# Patient Record
Sex: FEMALE | Race: AMERICAN INDIAN OR ALASKA NATIVE | Employment: FULL TIME | URBAN - METROPOLITAN AREA
[De-identification: names, ages, dates, MRNs, and addresses within clinical notes are randomized per-mention and may not be internally consistent; named-entity substitution may affect disease eponyms.]

---

## 2017-01-03 ENCOUNTER — OFFICE VISIT (OUTPATIENT)
Dept: INTERNAL MEDICINE CLINIC | Facility: CLINIC | Age: 47
End: 2017-01-03

## 2017-01-03 VITALS
OXYGEN SATURATION: 94 % | HEIGHT: 64.75 IN | TEMPERATURE: 98 F | WEIGHT: 160 LBS | HEART RATE: 64 BPM | RESPIRATION RATE: 16 BRPM | DIASTOLIC BLOOD PRESSURE: 80 MMHG | SYSTOLIC BLOOD PRESSURE: 124 MMHG | BODY MASS INDEX: 26.98 KG/M2

## 2017-01-03 DIAGNOSIS — H81.12 BPPV (BENIGN PAROXYSMAL POSITIONAL VERTIGO), LEFT: Primary | ICD-10-CM

## 2017-01-03 DIAGNOSIS — R35.0 INCREASED URINARY FREQUENCY: ICD-10-CM

## 2017-01-03 DIAGNOSIS — G43.009 MIGRAINE WITHOUT AURA AND WITHOUT STATUS MIGRAINOSUS, NOT INTRACTABLE: ICD-10-CM

## 2017-01-03 LAB
APPEARANCE: CLEAR
BILIRUBIN: NEGATIVE
GLUCOSE (URINE DIPSTICK): NEGATIVE MG/DL
KETONES (URINE DIPSTICK): NEGATIVE MG/DL
MULTISTIX LOT#: ABNORMAL NUMERIC
NITRITE, URINE: NEGATIVE
OCCULT BLOOD: NEGATIVE
PH, URINE: 5.5 (ref 4.5–8)
PROTEIN (URINE DIPSTICK): NEGATIVE MG/DL
SPECIFIC GRAVITY: 1.01 (ref 1–1.03)
URINE-COLOR: YELLOW
UROBILINOGEN,SEMI-QN: 0.2 MG/DL (ref 0–1.9)

## 2017-01-03 PROCEDURE — 81003 URINALYSIS AUTO W/O SCOPE: CPT | Performed by: INTERNAL MEDICINE

## 2017-01-03 PROCEDURE — 99214 OFFICE O/P EST MOD 30 MIN: CPT | Performed by: INTERNAL MEDICINE

## 2017-01-03 RX ORDER — PREDNISONE 20 MG/1
TABLET ORAL
Qty: 10 TABLET | Refills: 0 | Status: SHIPPED | OUTPATIENT
Start: 2017-01-03 | End: 2017-04-01 | Stop reason: ALTCHOICE

## 2017-01-03 RX ORDER — BUTALBITAL, ACETAMINOPHEN AND CAFFEINE 300; 40; 50 MG/1; MG/1; MG/1
1 CAPSULE ORAL EVERY 4 HOURS PRN
Qty: 84 CAPSULE | Refills: 0 | Status: SHIPPED | OUTPATIENT
Start: 2017-01-03 | End: 2020-05-27 | Stop reason: ALTCHOICE

## 2017-01-03 NOTE — PATIENT INSTRUCTIONS
Benign Paroxysmal Positional Vertigo    Benign paroxysmal positional vertigo is a common condition. You feel as if the room is spinning after changing position, moving your head quickly, or even just rolling over in bed.   Vertigo is a false feeling of mo If you had a CT or MRI scan, a specialist will review it. You will be told of any new findings that may affect your care.   When to seek medical advice  Call your healthcare provider right away if any of these occur:  · Vertigo gets worse even after taking

## 2017-01-04 NOTE — PROGRESS NOTES
HPI:    Patient ID: Florentin Mtz is a 48year old female. HPI  27-year-old female complaining of dizziness. wqas seen in ER.  The patient states  started about a week ago she woke up with a feeling of dizziness at the room spinning this would happe capsule Rfl: 0   predniSONE 20 MG Oral Tab Take 2 tablets by mouth daily x 5 days Disp: 10 tablet Rfl: 0   Meclizine HCl 25 MG Oral Tab Take 1 tablet (25 mg total) by mouth 3 (three) times daily as needed.  Disp: 20 tablet Rfl: 0   HYDROCHLOROTHIAZIDE 25 MG Cont home vertigo exercises    Orders Placed This Encounter  Urine Dip, auto without Micro    Meds This Visit:  Signed Prescriptions Disp Refills    Butalbital-APAP-Caffeine -40 MG Oral Cap 84 capsule 0      Sig: Take 1 capsule by mouth every 4 (four

## 2017-01-10 LAB
CHOL/HDL RATIO: 4.5 (ref ?–4.44)
CHOLESTEROL: 236 MG/DL (ref ?–200)
CREATININE: 1
GLUCOSE: 85 MG/DL (ref 70–99)
HDL CHOL: 52
LDL CHOLESTROL: 122
TRIGLYCERIDES: 309 MG/DL (ref ?–150)

## 2017-01-30 RX ORDER — ALPRAZOLAM 0.25 MG/1
TABLET ORAL
Qty: 30 TABLET | Refills: 0 | Status: SHIPPED
Start: 2017-01-30 | End: 2017-08-11

## 2017-02-02 ENCOUNTER — TELEPHONE (OUTPATIENT)
Dept: INTERNAL MEDICINE CLINIC | Facility: CLINIC | Age: 47
End: 2017-02-02

## 2017-02-02 DIAGNOSIS — E83.39 SERUM PHOSPHATE ELEVATED: Primary | ICD-10-CM

## 2017-02-02 NOTE — TELEPHONE ENCOUNTER
Per Dr Lambert Gave: CBC is NL, 10 year CAD 2.2%, no statin at this time just lifestyle changes (diet and exercise), no DM. Elevated phosphorus, please repeat. Spoke with patient and relayed test results. She verbalized understanding and agreed with plan.

## 2017-02-27 ENCOUNTER — TELEPHONE (OUTPATIENT)
Dept: INTERNAL MEDICINE CLINIC | Facility: CLINIC | Age: 47
End: 2017-02-27

## 2017-02-27 ENCOUNTER — APPOINTMENT (OUTPATIENT)
Dept: LAB | Age: 47
End: 2017-02-27
Attending: INTERNAL MEDICINE
Payer: COMMERCIAL

## 2017-02-27 DIAGNOSIS — Z12.31 ENCOUNTER FOR SCREENING MAMMOGRAM FOR BREAST CANCER: Primary | ICD-10-CM

## 2017-02-27 DIAGNOSIS — E83.39 SERUM PHOSPHATE ELEVATED: ICD-10-CM

## 2017-02-27 LAB — PHOSPHATE SERPL-MCNC: 3.1 MG/DL (ref 2.5–4.9)

## 2017-02-27 PROCEDURE — 84100 ASSAY OF PHOSPHORUS: CPT

## 2017-02-27 PROCEDURE — 36415 COLL VENOUS BLD VENIPUNCTURE: CPT

## 2017-03-06 ENCOUNTER — HOSPITAL ENCOUNTER (OUTPATIENT)
Dept: MAMMOGRAPHY | Age: 47
Discharge: HOME | End: 2017-03-06
Attending: INTERNAL MEDICINE
Payer: COMMERCIAL

## 2017-03-06 DIAGNOSIS — Z12.31 ENCOUNTER FOR SCREENING MAMMOGRAM FOR BREAST CANCER: ICD-10-CM

## 2017-03-06 PROCEDURE — 77067 SCR MAMMO BI INCL CAD: CPT

## 2017-03-08 ENCOUNTER — HOSPITAL ENCOUNTER (OUTPATIENT)
Dept: MAMMOGRAPHY | Facility: HOSPITAL | Age: 47
Discharge: HOME | End: 2017-03-08
Attending: INTERNAL MEDICINE
Payer: COMMERCIAL

## 2017-03-08 ENCOUNTER — TELEPHONE (OUTPATIENT)
Dept: OBGYN CLINIC | Facility: CLINIC | Age: 47
End: 2017-03-08

## 2017-03-08 DIAGNOSIS — R92.2 INCONCLUSIVE MAMMOGRAM: ICD-10-CM

## 2017-03-08 PROCEDURE — 77061 BREAST TOMOSYNTHESIS UNI: CPT

## 2017-03-08 PROCEDURE — 77065 DX MAMMO INCL CAD UNI: CPT

## 2017-03-08 PROCEDURE — 76642 ULTRASOUND BREAST LIMITED: CPT

## 2017-03-08 NOTE — TELEPHONE ENCOUNTER
Pt saw Dr. Cinthia Walton in December for Left Ovarian pain    Pt said they discussed an US. Can we order and set up an appt?     Please advise PT

## 2017-03-08 NOTE — TELEPHONE ENCOUNTER
Please schedule patient for US in [place] per Dr. Adilene Garcia note. She can see him after to discuss findings.

## 2017-03-15 ENCOUNTER — TELEPHONE (OUTPATIENT)
Dept: OBGYN CLINIC | Facility: CLINIC | Age: 47
End: 2017-03-15

## 2017-03-15 NOTE — TELEPHONE ENCOUNTER
Uncomfortable moderate pain in lower left abdomen  PT taking over the counter pain meds  PT would like a call tomorrow when the nurses come in to see if there is something else she can take  If her pain gets worse she will call the doctor on call this even

## 2017-03-16 NOTE — TELEPHONE ENCOUNTER
Patient has appointment 03/17/17. States she has been having pain in the left ovary area. Has been taking Aleve. No improvement. Advised patient she can take 600 MG every 6 hours. To call if symptoms worsen before appointment.   Patient verbalized under

## 2017-03-17 ENCOUNTER — OFFICE VISIT (OUTPATIENT)
Dept: OBGYN CLINIC | Facility: CLINIC | Age: 47
End: 2017-03-17

## 2017-03-17 ENCOUNTER — APPOINTMENT (OUTPATIENT)
Dept: OBGYN CLINIC | Facility: CLINIC | Age: 47
End: 2017-03-17

## 2017-03-17 DIAGNOSIS — R10.32 COLICKY LLQ ABDOMINAL PAIN: Primary | ICD-10-CM

## 2017-03-17 PROCEDURE — 76856 US EXAM PELVIC COMPLETE: CPT | Performed by: OBSTETRICS & GYNECOLOGY

## 2017-03-17 PROCEDURE — 99212 OFFICE O/P EST SF 10 MIN: CPT | Performed by: OBSTETRICS & GYNECOLOGY

## 2017-03-17 NOTE — PROGRESS NOTES
Quick Note:    Here for gynecological ultrasound due to persistent colicky LLQ  pain. Saw Dr Odilia Capone in December 2016 for same. Cyst was suspected  but no ultrasound done. Was to return if persisted but did not.   Called recently since pain persists and ultras

## 2017-03-17 NOTE — PROGRESS NOTES
Here for gynecological ultrasound due to persistent colicky LLQ  pain. Saw Dr Belén Borges in December 2016 for same. Cyst was suspected  but no ultrasound done. Was to return if persisted but did not.   Called recently since pain persists and ultrasound recommende

## 2017-03-28 DIAGNOSIS — I10 ESSENTIAL HYPERTENSION, BENIGN: Primary | ICD-10-CM

## 2017-03-28 RX ORDER — HYDROCHLOROTHIAZIDE 25 MG/1
TABLET ORAL
Qty: 90 TABLET | Refills: 0 | Status: SHIPPED | OUTPATIENT
Start: 2017-03-28 | End: 2017-08-27

## 2017-04-01 ENCOUNTER — HOSPITAL ENCOUNTER (OUTPATIENT)
Age: 47
Discharge: HOME | End: 2017-04-01
Attending: FAMILY MEDICINE
Payer: COMMERCIAL

## 2017-04-01 VITALS
RESPIRATION RATE: 16 BRPM | SYSTOLIC BLOOD PRESSURE: 150 MMHG | HEART RATE: 87 BPM | DIASTOLIC BLOOD PRESSURE: 98 MMHG | OXYGEN SATURATION: 99 % | TEMPERATURE: 99 F

## 2017-04-01 DIAGNOSIS — J02.0 STREP PHARYNGITIS: Primary | ICD-10-CM

## 2017-04-01 PROCEDURE — 99213 OFFICE O/P EST LOW 20 MIN: CPT

## 2017-04-01 PROCEDURE — 87430 STREP A AG IA: CPT | Performed by: FAMILY MEDICINE

## 2017-04-01 PROCEDURE — 99204 OFFICE O/P NEW MOD 45 MIN: CPT

## 2017-04-01 RX ORDER — AMOXICILLIN 875 MG/1
875 TABLET, COATED ORAL 2 TIMES DAILY
Qty: 20 TABLET | Refills: 0 | Status: SHIPPED | OUTPATIENT
Start: 2017-04-01 | End: 2017-04-11

## 2017-04-01 RX ORDER — IBUPROFEN 600 MG/1
600 TABLET ORAL ONCE
Status: COMPLETED | OUTPATIENT
Start: 2017-04-01 | End: 2017-04-01

## 2017-04-01 NOTE — ED PROVIDER NOTES
Patient Seen in: [facility]    History   Patient presents with:  Sore Throat  Ear Problem Pain (neurosensory)    Stated Complaint: SORE THROAT X 3 DAYS    HPI    47-year-old  presents to immediate care with a hi capsule by mouth every 4 (four) hours as needed for Pain.    MONTELUKAST SODIUM 10 MG Oral Tab,  TAKE 1 TABLET BY MOUTH EVERY DAY   FLUTICASONE PROPIONATE 50 MCG/ACT Nasal Suspension,  USE 2 SPRAYS NASALLY DAILY   Fexofenadine HCl (ALLEGRA) 180 MG Oral Tab, intact distal pulses. Pulmonary/Chest: Effort normal and breath sounds normal.   Abdominal: Soft. Bowel sounds are normal.   Neurological: She is alert and oriented to person, place, and time. She has normal reflexes. Skin: Skin is warm and dry.

## 2017-04-02 NOTE — ED NOTES
Call placed to pt in response to [facility] text. Pt states she wasn't feeling better this morning, but she is beginning to feel better now. No further questions. Says there is nothing more that she needs.

## 2017-04-04 ENCOUNTER — TELEPHONE (OUTPATIENT)
Dept: INTERNAL MEDICINE CLINIC | Facility: CLINIC | Age: 47
End: 2017-04-04

## 2017-04-04 NOTE — TELEPHONE ENCOUNTER
Pt was seen in the urgent care on Saturday, was diagnosed with strep and was treated with an antibiotic. Rx was amoxicillin bid for 10 days.      Pt states thought she was feeling improved from Saturday but began to experience worsening symptoms including t

## 2017-04-04 NOTE — TELEPHONE ENCOUNTER
Pt was seen at the urgent care and was diagnoised with strep and is feeling a little better, but now is having some ear pain and sweats, dont know is she needs to be seen or not?  Call back

## 2017-04-04 NOTE — TELEPHONE ENCOUNTER
Pt states went to the nurse and the ears have some redness present and no fever. Pt states would prefer to continue to monitor and treat symptoms OTC. If symptoms continue to fail improvement the pt will call and schedule an appointment.

## 2017-05-26 DIAGNOSIS — J30.9 ALLERGIC RHINITIS, UNSPECIFIED ALLERGIC RHINITIS TRIGGER, UNSPECIFIED RHINITIS SEASONALITY: Primary | ICD-10-CM

## 2017-05-30 RX ORDER — MONTELUKAST SODIUM 10 MG/1
TABLET ORAL
Qty: 90 TABLET | Refills: 0 | Status: SHIPPED | OUTPATIENT
Start: 2017-05-30 | End: 2017-12-20

## 2017-07-19 ENCOUNTER — OFFICE VISIT (OUTPATIENT)
Dept: INTERNAL MEDICINE CLINIC | Facility: CLINIC | Age: 47
End: 2017-07-19

## 2017-07-19 ENCOUNTER — TELEPHONE (OUTPATIENT)
Dept: INTERNAL MEDICINE CLINIC | Facility: CLINIC | Age: 47
End: 2017-07-19

## 2017-07-19 VITALS
RESPIRATION RATE: 16 BRPM | WEIGHT: 158 LBS | DIASTOLIC BLOOD PRESSURE: 80 MMHG | SYSTOLIC BLOOD PRESSURE: 120 MMHG | HEART RATE: 80 BPM | HEIGHT: 64.75 IN | TEMPERATURE: 99 F | BODY MASS INDEX: 26.65 KG/M2 | OXYGEN SATURATION: 99 %

## 2017-07-19 DIAGNOSIS — R10.9 ABDOMINAL DISCOMFORT: Primary | ICD-10-CM

## 2017-07-19 PROCEDURE — 99214 OFFICE O/P EST MOD 30 MIN: CPT | Performed by: PHYSICIAN ASSISTANT

## 2017-07-19 NOTE — PROGRESS NOTES
HPI:    Patient ID: Natalya Glez is a 50year old female. Abdominal Pain   This is a new problem. The current episode started yesterday. The onset quality is sudden. The problem occurs constantly. The problem has been unchanged.  The pain is locate Rfl: 0   FLUTICASONE PROPIONATE 50 MCG/ACT Nasal Suspension USE 2 SPRAYS NASALLY DAILY Disp: 48 g Rfl: 0   Fexofenadine HCl (ALLEGRA) 180 MG Oral Tab Take 180 mg by mouth daily.  Disp:  Rfl:    Ciprofloxacin HCl (CIPRO) 500 MG Oral Tab Take 1 tablet (500 mg bowel rest and monitor symptoms; call right away if worsening symptoms including pain, fever, diarrhea, blood in stool, or if symptoms fail to improve. Pt expresses understanding and agrees to the plan. F/u prn.      No orders of the defined types were

## 2017-07-19 NOTE — TELEPHONE ENCOUNTER
Spoke with pt she has had left sided abdominal directly located across from [employer].  Pt describes as an \"uncomfortable feeling\" and 3/10 on pain scale. She denies fever or change in bowel habits. She does report nausea off/on. Pain is increasing.   Appt

## 2017-07-19 NOTE — TELEPHONE ENCOUNTER
Patient called and wanted to be seen today with only Anant Leal, but 215pm did not work for her, so she scheduled with Dr Sandra Tariq tomorrow, but would like to speak to RN for her L Abdominal pain. Please advise.

## 2017-07-21 ENCOUNTER — HOSPITAL ENCOUNTER (OUTPATIENT)
Dept: CT IMAGING | Age: 47
Discharge: HOME | End: 2017-07-21
Attending: PHYSICIAN ASSISTANT
Payer: COMMERCIAL

## 2017-07-21 ENCOUNTER — TELEPHONE (OUTPATIENT)
Dept: INTERNAL MEDICINE CLINIC | Facility: CLINIC | Age: 47
End: 2017-07-21

## 2017-07-21 DIAGNOSIS — R10.9 ABDOMINAL PAIN, UNSPECIFIED LOCATION: ICD-10-CM

## 2017-07-21 DIAGNOSIS — R50.9 LOW GRADE FEVER: ICD-10-CM

## 2017-07-21 DIAGNOSIS — K57.92 DIVERTICULITIS OF INTESTINE, UNSPECIFIED BLEEDING STATUS, UNSPECIFIED COMPLICATION STATUS, UNSPECIFIED PART OF INTESTINAL TRACT: ICD-10-CM

## 2017-07-21 DIAGNOSIS — R19.7 DIARRHEA, UNSPECIFIED TYPE: Primary | ICD-10-CM

## 2017-07-21 DIAGNOSIS — R19.7 DIARRHEA, UNSPECIFIED TYPE: ICD-10-CM

## 2017-07-21 PROBLEM — Z87.19 HISTORY OF DIVERTICULITIS: Status: ACTIVE | Noted: 2017-07-21

## 2017-07-21 PROCEDURE — 74177 CT ABD & PELVIS W/CONTRAST: CPT | Performed by: PHYSICIAN ASSISTANT

## 2017-07-21 RX ORDER — CIPROFLOXACIN 500 MG/1
500 TABLET, FILM COATED ORAL 2 TIMES DAILY
Qty: 20 TABLET | Refills: 0 | Status: SHIPPED | OUTPATIENT
Start: 2017-07-21 | End: 2017-08-11 | Stop reason: ALTCHOICE

## 2017-07-21 RX ORDER — METRONIDAZOLE 500 MG/1
500 TABLET ORAL 3 TIMES DAILY
Qty: 30 TABLET | Refills: 0 | Status: SHIPPED | OUTPATIENT
Start: 2017-07-21 | End: 2017-08-11

## 2017-07-21 NOTE — TELEPHONE ENCOUNTER
Pt calling to give condition update - she is still having abdominal pain, diarrhea, and a low grade fever. Wanted to check in with Benita Bray to see what she recommends before the weekend.

## 2017-07-21 NOTE — TELEPHONE ENCOUNTER
Patient states diarrhea started today and she is still having low grade fevers 99.3 and nausea. Denies vomiting and blood in stool. Last ate 1 hr ago (egg mc sera). Jose Edouard recommends STAT CT Abd/Pelvis with contrast only to r/o diverticulitis.  And

## 2017-07-27 ENCOUNTER — TELEPHONE (OUTPATIENT)
Dept: INTERNAL MEDICINE CLINIC | Facility: CLINIC | Age: 47
End: 2017-07-27

## 2017-07-27 DIAGNOSIS — R42 DIZZINESS: Primary | ICD-10-CM

## 2017-07-27 RX ORDER — MECLIZINE HYDROCHLORIDE 25 MG/1
25 TABLET ORAL 3 TIMES DAILY PRN
Qty: 30 TABLET | Refills: 0 | Status: SHIPPED | OUTPATIENT
Start: 2017-07-27 | End: 2017-08-17

## 2017-07-27 NOTE — TELEPHONE ENCOUNTER
VOICEMAIL; pt has been having some dizzy spells and thinks maybe its the antibiotics , what should she do, call back

## 2017-07-27 NOTE — TELEPHONE ENCOUNTER
Pt taking cipro and flagyl for diverticulitis. Spoke with pt she is experiencing dizziness with these medications. Pt reports her abdominal pain has not resolved completely but has improved.       Per Dr. Aileen Pablo finish antibiotics as prescribed and begin Mec

## 2017-08-11 ENCOUNTER — OFFICE VISIT (OUTPATIENT)
Dept: INTERNAL MEDICINE CLINIC | Facility: CLINIC | Age: 47
End: 2017-08-11

## 2017-08-11 VITALS
DIASTOLIC BLOOD PRESSURE: 84 MMHG | OXYGEN SATURATION: 98 % | SYSTOLIC BLOOD PRESSURE: 118 MMHG | BODY MASS INDEX: 26.31 KG/M2 | HEIGHT: 64.75 IN | RESPIRATION RATE: 16 BRPM | TEMPERATURE: 98 F | WEIGHT: 156 LBS | HEART RATE: 77 BPM

## 2017-08-11 DIAGNOSIS — Z00.00 ROUTINE PHYSICAL EXAMINATION: Primary | ICD-10-CM

## 2017-08-11 DIAGNOSIS — Z01.419 ENCOUNTER FOR GYNECOLOGICAL EXAMINATION WITHOUT ABNORMAL FINDING: ICD-10-CM

## 2017-08-11 DIAGNOSIS — Z12.4 SCREENING FOR MALIGNANT NEOPLASM OF CERVIX: ICD-10-CM

## 2017-08-11 DIAGNOSIS — K57.92 DIVERTICULITIS OF INTESTINE, UNSPECIFIED BLEEDING STATUS, UNSPECIFIED COMPLICATION STATUS, UNSPECIFIED PART OF INTESTINAL TRACT: ICD-10-CM

## 2017-08-11 PROCEDURE — 99396 PREV VISIT EST AGE 40-64: CPT | Performed by: PHYSICIAN ASSISTANT

## 2017-08-11 PROCEDURE — 88175 CYTOPATH C/V AUTO FLUID REDO: CPT | Performed by: PHYSICIAN ASSISTANT

## 2017-08-11 PROCEDURE — 87624 HPV HI-RISK TYP POOLED RSLT: CPT | Performed by: PHYSICIAN ASSISTANT

## 2017-08-11 RX ORDER — CIPROFLOXACIN 500 MG/1
500 TABLET, FILM COATED ORAL 2 TIMES DAILY
Qty: 20 TABLET | Refills: 0 | Status: SHIPPED | OUTPATIENT
Start: 2017-08-11 | End: 2017-08-17

## 2017-08-11 RX ORDER — METRONIDAZOLE 500 MG/1
500 TABLET ORAL 3 TIMES DAILY
Qty: 30 TABLET | Refills: 0 | Status: SHIPPED | OUTPATIENT
Start: 2017-08-11 | End: 2017-08-17

## 2017-08-11 RX ORDER — ALPRAZOLAM 0.25 MG/1
TABLET ORAL
Qty: 30 TABLET | Refills: 0 | Status: SHIPPED
Start: 2017-08-11 | End: 2017-12-20

## 2017-08-11 NOTE — PATIENT INSTRUCTIONS
Take antibiotic as directed until completely finished. Contact us if you experience any adverse reaction to the medication.     Use meclizine if needed for dizziness  Continue xanax as needed for anxiety or insomnia  Schedule with gastroenterologist for fol

## 2017-08-11 NOTE — PROGRESS NOTES
Wellness Exam    CC: Patient is presenting for a wellness exam    HPI:   Concerns: diverticulitis episode 1 month ago, took abx as directed. C/o fatigue and low-grade temp up to 100.4, consistently with some diaphoresis. Assoc with some joint pain.      Per HISTORY      Comment: TVT taping of cystocele  No date: UPPER ARM/ELBOW SURGERY UNLISTED      Comment: repair fx of LUE  Smoking status: Never Smoker                                                              Smokeless tobacco: Never Used arthralgias and gait problem. Skin: Negative for color change and rash. Neurological: Negative for tremors, weakness and numbness. Hematological: Negative for adenopathy. Does not bruise/bleed easily.    Psychiatric/Behavioral: Negative for confusion for a wellness exam.  Age appropriate cancer screening, labs, safety, immunizations were discussed with the patient and ordered as follows: Annual Physical due on 07[birth year]06/1970  Pap Smear,3 Years due on 01/10/2017     Pap today with HPV.  Declines STI scree

## 2017-08-15 LAB
LAST PAP RESULT: NORMAL
PAP HISTORY (OTHER THAN LAST PAP): NORMAL

## 2017-08-16 ENCOUNTER — TELEPHONE (OUTPATIENT)
Dept: INTERNAL MEDICINE CLINIC | Facility: CLINIC | Age: 47
End: 2017-08-16

## 2017-08-16 DIAGNOSIS — K57.92 DIVERTICULITIS OF INTESTINE, UNSPECIFIED BLEEDING STATUS, UNSPECIFIED COMPLICATION STATUS, UNSPECIFIED PART OF INTESTINAL TRACT: Primary | ICD-10-CM

## 2017-08-16 LAB — HPV I/H RISK 1 DNA SPEC QL NAA+PROBE: NEGATIVE

## 2017-08-16 NOTE — TELEPHONE ENCOUNTER
Jordyn Mar  [facility] Clinical Staff Cc: [facility] Referral Pool   Phone Number: [phone]             .Reason for the order/referral:[facility]   PCP: Jeimy Zheng   Refer to Provider: Yoandy Neal   Specialty:Gastro   Patient Insurance: Payor: [address] [facility]

## 2017-08-24 ENCOUNTER — HOSPITAL ENCOUNTER (OUTPATIENT)
Dept: CT IMAGING | Age: 47
Discharge: HOME | End: 2017-08-24
Attending: INTERNAL MEDICINE
Payer: COMMERCIAL

## 2017-08-24 DIAGNOSIS — K57.32 DIVERTICULITIS OF LARGE INTESTINE WITHOUT PERFORATION OR ABSCESS WITHOUT BLEEDING: ICD-10-CM

## 2017-08-24 PROCEDURE — 74177 CT ABD & PELVIS W/CONTRAST: CPT | Performed by: INTERNAL MEDICINE

## 2017-08-24 NOTE — PROGRESS NOTES
Ct scan looked okay, diverticulosis was noted but no active inflammation noted currently    Ahmet Norris MD  [facility] Gastroenterology

## 2017-08-27 DIAGNOSIS — I10 ESSENTIAL HYPERTENSION, BENIGN: ICD-10-CM

## 2017-08-28 RX ORDER — HYDROCHLOROTHIAZIDE 25 MG/1
TABLET ORAL
Qty: 90 TABLET | Refills: 2 | Status: SHIPPED | OUTPATIENT
Start: 2017-08-28 | End: 2018-10-13

## 2017-09-19 ENCOUNTER — OFFICE VISIT (OUTPATIENT)
Dept: INTERNAL MEDICINE CLINIC | Facility: CLINIC | Age: 47
End: 2017-09-19

## 2017-09-19 VITALS
DIASTOLIC BLOOD PRESSURE: 84 MMHG | SYSTOLIC BLOOD PRESSURE: 116 MMHG | HEIGHT: 65 IN | BODY MASS INDEX: 26.82 KG/M2 | TEMPERATURE: 98 F | RESPIRATION RATE: 12 BRPM | WEIGHT: 161 LBS | HEART RATE: 88 BPM

## 2017-09-19 DIAGNOSIS — N30.01 ACUTE CYSTITIS WITH HEMATURIA: Primary | ICD-10-CM

## 2017-09-19 DIAGNOSIS — R35.0 URINARY FREQUENCY: ICD-10-CM

## 2017-09-19 LAB
APPEARANCE: CLEAR
BILIRUBIN: NEGATIVE
GLUCOSE (URINE DIPSTICK): NEGATIVE MG/DL
KETONES (URINE DIPSTICK): NEGATIVE MG/DL
MULTISTIX LOT#: ABNORMAL NUMERIC
NITRITE, URINE: NEGATIVE
PH, URINE: 5.5 (ref 4.5–8)
PROTEIN (URINE DIPSTICK): NEGATIVE MG/DL
SPECIFIC GRAVITY: 1.01 (ref 1–1.03)
URINE-COLOR: YELLOW
UROBILINOGEN,SEMI-QN: 0.2 MG/DL (ref 0–1.9)

## 2017-09-19 PROCEDURE — 87077 CULTURE AEROBIC IDENTIFY: CPT | Performed by: PHYSICIAN ASSISTANT

## 2017-09-19 PROCEDURE — 99213 OFFICE O/P EST LOW 20 MIN: CPT | Performed by: PHYSICIAN ASSISTANT

## 2017-09-19 PROCEDURE — 81003 URINALYSIS AUTO W/O SCOPE: CPT | Performed by: PHYSICIAN ASSISTANT

## 2017-09-19 PROCEDURE — 87086 URINE CULTURE/COLONY COUNT: CPT | Performed by: PHYSICIAN ASSISTANT

## 2017-09-19 PROCEDURE — 87186 SC STD MICRODIL/AGAR DIL: CPT | Performed by: PHYSICIAN ASSISTANT

## 2017-09-19 RX ORDER — LORATADINE 10 MG/1
10 TABLET ORAL DAILY
COMMUNITY

## 2017-09-19 RX ORDER — SULFAMETHOXAZOLE AND TRIMETHOPRIM 800; 160 MG/1; MG/1
1 TABLET ORAL 2 TIMES DAILY
Qty: 6 TABLET | Refills: 0 | Status: SHIPPED | OUTPATIENT
Start: 2017-09-19 | End: 2018-01-30

## 2017-09-19 NOTE — PATIENT INSTRUCTIONS
Take AZO over-the-counter as needed for bladder pain  Take antibiotic as directed until completely finished. Contact us if you experience any adverse reaction to the medication.

## 2017-09-19 NOTE — PROGRESS NOTES
Adam Lombardo is a 50year old female. HPI:   Patient presents with symptoms of UTI for 3 days. Complaining of urinary frequency, urgency, dysuria  Denies back pain, fever, hematuria, suprapubic pain  Has tried nothing for treatment.        Current tobacco: Never Used                      Alcohol use: Yes           0.0 - 0.5 oz/week     Standard drinks or equivalent: 0 - 1 per week     Comment: occasinal once a month        REVIEW OF SYSTEMS:   GENERAL HEALTH: feels well otherwise  SKIN: denies any u

## 2017-09-27 PROCEDURE — 88305 TISSUE EXAM BY PATHOLOGIST: CPT | Performed by: INTERNAL MEDICINE

## 2017-11-13 ENCOUNTER — HOSPITAL ENCOUNTER (EMERGENCY)
Facility: HOSPITAL | Age: 47
Discharge: HOME | End: 2017-11-13
Attending: EMERGENCY MEDICINE
Payer: COMMERCIAL

## 2017-11-13 VITALS
RESPIRATION RATE: 18 BRPM | OXYGEN SATURATION: 99 % | HEIGHT: 65 IN | BODY MASS INDEX: 26.66 KG/M2 | SYSTOLIC BLOOD PRESSURE: 132 MMHG | DIASTOLIC BLOOD PRESSURE: 92 MMHG | TEMPERATURE: 98 F | WEIGHT: 160 LBS | HEART RATE: 77 BPM

## 2017-11-13 DIAGNOSIS — R10.9 ABDOMINAL PAIN OF UNKNOWN ETIOLOGY: Primary | ICD-10-CM

## 2017-11-13 DIAGNOSIS — N30.00 ACUTE CYSTITIS WITHOUT HEMATURIA: ICD-10-CM

## 2017-11-13 PROCEDURE — 81001 URINALYSIS AUTO W/SCOPE: CPT | Performed by: EMERGENCY MEDICINE

## 2017-11-13 PROCEDURE — 99283 EMERGENCY DEPT VISIT LOW MDM: CPT

## 2017-11-13 PROCEDURE — 85025 COMPLETE CBC W/AUTO DIFF WBC: CPT | Performed by: EMERGENCY MEDICINE

## 2017-11-13 PROCEDURE — 80053 COMPREHEN METABOLIC PANEL: CPT | Performed by: EMERGENCY MEDICINE

## 2017-11-13 PROCEDURE — 99284 EMERGENCY DEPT VISIT MOD MDM: CPT

## 2017-11-13 PROCEDURE — 87086 URINE CULTURE/COLONY COUNT: CPT | Performed by: EMERGENCY MEDICINE

## 2017-11-13 PROCEDURE — 36415 COLL VENOUS BLD VENIPUNCTURE: CPT

## 2017-11-13 PROCEDURE — 83690 ASSAY OF LIPASE: CPT | Performed by: EMERGENCY MEDICINE

## 2017-11-13 RX ORDER — CEPHALEXIN 500 MG/1
500 CAPSULE ORAL 4 TIMES DAILY
Qty: 40 CAPSULE | Refills: 0 | Status: SHIPPED | OUTPATIENT
Start: 2017-11-13 | End: 2017-11-23

## 2017-11-14 ENCOUNTER — TELEPHONE (OUTPATIENT)
Dept: INTERNAL MEDICINE CLINIC | Facility: CLINIC | Age: 47
End: 2017-11-14

## 2017-11-14 DIAGNOSIS — R93.41 ABNORMAL RADIOLOGIC FINDINGS ON DIAGNOSTIC IMAGING OF RENAL PELVIS, URETER, OR BLADDER: Primary | ICD-10-CM

## 2017-11-14 NOTE — ED INITIAL ASSESSMENT (HPI)
Pt had an abd CT on FRi for L upper abd pain for over 1 week- hx diverticulitis ; pt states MD called today to tell her to come in for \"air in bladder\" (no diverticulitis)

## 2017-11-14 NOTE — TELEPHONE ENCOUNTER
Pt was seen in the ER due to abdominal pain. CT results showed no diverticulitis. The pt was treated for acute cystitis with Keflex 500mg 1 tablet 4 times daily for 10 days. The pt has since taken 2 doses.      Pt is now still c/o constant sharp/stabbing pa

## 2017-11-14 NOTE — TELEPHONE ENCOUNTER
Pt talked to Dr. Lavonne Alberto last night and she sent her to the Er, pt was sent home with antibiotic and still not feeling well, would like to speak to chey

## 2017-11-14 NOTE — ED PROVIDER NOTES
Patient Seen in: [facility] Emergency Department    History   Patient presents with:  Abnormal Result (metabolic, cardiac)    Stated Complaint: ABN CT DONE ON FRI    HPI    Patient is a 49-year-old female who states she has had abdominal pain for th year  9/27/2017: COLONOSCOPY N/A      Comment: Procedure: COLONOSCOPY, POSSIBLE BIOPSY,                POSSIBLE POLYPECTOMY 81900;  Surgeon: Vj Houston MD;  Location: [facility]  03/2015: HYSTERECTOMY      Comment: total hysterec tenderness, good capillary refill. SKIN: No rash, good turgor. NEURO: Patient answers questions appropriately. No focal deficits appreciated.          ED Course     Labs Reviewed   URINALYSIS WITH CULTURE REFLEX - Abnormal; Notable for the following: comfortable going home. Patient declines anything for pain. Patient will follow-up with primary doctor and return if increased pain, fever, new or worse symptoms.             Disposition and Plan     Clinical Impression:  Abdominal pain of unknown etiolog

## 2017-12-07 ENCOUNTER — HOSPITAL ENCOUNTER (OUTPATIENT)
Dept: ULTRASOUND IMAGING | Age: 47
Discharge: HOME | End: 2017-12-07
Attending: INTERNAL MEDICINE
Payer: COMMERCIAL

## 2017-12-07 DIAGNOSIS — R93.41 ABNORMAL RADIOLOGIC FINDINGS ON DIAGNOSTIC IMAGING OF RENAL PELVIS, URETER, OR BLADDER: ICD-10-CM

## 2017-12-07 PROCEDURE — 76770 US EXAM ABDO BACK WALL COMP: CPT | Performed by: INTERNAL MEDICINE

## 2017-12-20 DIAGNOSIS — J30.2 SEASONAL ALLERGIC RHINITIS, UNSPECIFIED CHRONICITY, UNSPECIFIED TRIGGER: Primary | ICD-10-CM

## 2017-12-20 RX ORDER — MONTELUKAST SODIUM 10 MG/1
10 TABLET ORAL
Qty: 90 TABLET | Refills: 0 | Status: SHIPPED
Start: 2017-12-20 | End: 2018-05-06

## 2017-12-20 RX ORDER — ALPRAZOLAM 0.25 MG/1
TABLET ORAL
Qty: 30 TABLET | Refills: 0 | Status: SHIPPED
Start: 2017-12-20 | End: 2018-04-16

## 2017-12-20 NOTE — TELEPHONE ENCOUNTER
From: Israel Masterson  Sent: 12/20/2017 7:38 AM CST  Subject: Medication Renewal Request    Steffanie Lorenzana would like a refill of the following medications:     MONTELUKAST SODIUM 10 MG Oral Tab Dae Lopez MD]    [facility] pharmacy: [facility]

## 2017-12-20 NOTE — TELEPHONE ENCOUNTER
From: Elisa Walter  Sent: 12/20/2017 7:37 AM CST  Subject: Medication Renewal Request    Steffanie Jorgensen would like a refill of the following medications:     ALPRAZolam 0.25 MG Oral Tab Maria Guadalupe Santana PA-C]    Preferred pharmacy[facility]

## 2018-01-30 ENCOUNTER — OFFICE VISIT (OUTPATIENT)
Dept: INTERNAL MEDICINE CLINIC | Facility: CLINIC | Age: 48
End: 2018-01-30

## 2018-01-30 VITALS
TEMPERATURE: 99 F | SYSTOLIC BLOOD PRESSURE: 132 MMHG | RESPIRATION RATE: 16 BRPM | DIASTOLIC BLOOD PRESSURE: 80 MMHG | HEART RATE: 84 BPM | BODY MASS INDEX: 27.57 KG/M2 | HEIGHT: 65 IN | WEIGHT: 165.5 LBS

## 2018-01-30 DIAGNOSIS — K52.9 ACUTE GASTROENTERITIS: Primary | ICD-10-CM

## 2018-01-30 PROCEDURE — 99213 OFFICE O/P EST LOW 20 MIN: CPT | Performed by: PHYSICIAN ASSISTANT

## 2018-01-30 NOTE — PROGRESS NOTES
HPI:    Patient ID: Ruiz Mendez is a 50year old female. Patient presents with:  Abdominal Pain: lower abdomen since yesterday  Fatigue: since sunday and achy low grade fever yesterday 99.9     Abdominal Pain   This is a new problem.  Episode onset: by mouth daily. Disp:  Rfl:    HYDROCHLOROTHIAZIDE 25 MG Oral Tab TAKE ONE TABLET BY MOUTH EVERY DAY Disp: 90 tablet Rfl: 2   FIBER OR Take by mouth. Disp:  Rfl:    Probiotic Product (PROBIOTIC OR) Take by mouth.  Disp:  Rfl:    Butalbital-APAP-Caffeine 50- encounter.       Meds This Visit:  No prescriptions requested or ordered in this encounter    Imaging & Referrals:  None         QB#[id number]

## 2018-02-02 NOTE — PATIENT INSTRUCTIONS
Try to eat a bland diet, rest, drink plenty of fluids and monitor your symptoms  Take tylenol if needed for fever or pain; try to avoid NSAIDs which may worsen abdominal pain  Call if symptoms are not improving within 5 days or if you have new or worsening

## 2018-02-23 LAB
AMB EXT CHOLESTEROL, TOTAL: 191 MG/DL
AMB EXT CREATININE: 0.93 MG/DL
AMB EXT GLUCOSE: 101 MG/DL
AMB EXT HDL CHOLESTEROL: 43 MG/DL
AMB EXT HEMATOCRIT: 44.3
AMB EXT HEMOGLOBIN: 14.7
AMB EXT HGBA1C: 5.3 %
AMB EXT LDL CHOLESTEROL, DIRECT: 107 MG/DL
AMB EXT MCV: 88
AMB EXT PLATELETS: 258
AMB EXT TRIGLYCERIDES: 206 MG/DL
AMB EXT WBC: 5.8 X10(3)UL

## 2018-04-05 ENCOUNTER — TELEPHONE (OUTPATIENT)
Dept: INTERNAL MEDICINE CLINIC | Facility: CLINIC | Age: 48
End: 2018-04-05

## 2018-04-05 DIAGNOSIS — Z12.39 SCREENING FOR MALIGNANT NEOPLASM OF BREAST: Primary | ICD-10-CM

## 2018-04-16 RX ORDER — ALPRAZOLAM 0.25 MG/1
TABLET ORAL
Qty: 30 TABLET | Refills: 0 | Status: SHIPPED
Start: 2018-04-16 | End: 2018-06-20

## 2018-05-07 RX ORDER — MONTELUKAST SODIUM 10 MG/1
TABLET ORAL
Qty: 90 TABLET | Refills: 0 | Status: SHIPPED | OUTPATIENT
Start: 2018-05-07 | End: 2018-09-23

## 2018-06-21 RX ORDER — ALPRAZOLAM 0.25 MG/1
TABLET ORAL
Qty: 30 TABLET | Refills: 0 | Status: SHIPPED
Start: 2018-06-21 | End: 2018-12-29

## 2018-08-15 ENCOUNTER — HOSPITAL ENCOUNTER (OUTPATIENT)
Dept: MAMMOGRAPHY | Age: 48
Discharge: HOME | End: 2018-08-15
Attending: INTERNAL MEDICINE
Payer: COMMERCIAL

## 2018-08-15 DIAGNOSIS — Z12.39 SCREENING FOR MALIGNANT NEOPLASM OF BREAST: ICD-10-CM

## 2018-08-15 PROCEDURE — 77067 SCR MAMMO BI INCL CAD: CPT | Performed by: INTERNAL MEDICINE

## 2018-08-15 PROCEDURE — 77063 BREAST TOMOSYNTHESIS BI: CPT | Performed by: INTERNAL MEDICINE

## 2018-09-23 DIAGNOSIS — J30.9 ALLERGIC RHINITIS, UNSPECIFIED SEASONALITY, UNSPECIFIED TRIGGER: Primary | ICD-10-CM

## 2018-09-23 RX ORDER — MONTELUKAST SODIUM 10 MG/1
TABLET ORAL
Qty: 90 TABLET | Refills: 0 | Status: SHIPPED | OUTPATIENT
Start: 2018-09-23 | End: 2019-02-27 | Stop reason: ALTCHOICE

## 2018-10-10 ENCOUNTER — HOSPITAL ENCOUNTER (OUTPATIENT)
Dept: CT IMAGING | Facility: HOSPITAL | Age: 48
Discharge: HOME | End: 2018-10-10
Attending: INTERNAL MEDICINE
Payer: COMMERCIAL

## 2018-10-10 DIAGNOSIS — K08.104: ICD-10-CM

## 2018-10-10 DIAGNOSIS — N89.8 VAGINAL FLATUS: ICD-10-CM

## 2018-10-10 DIAGNOSIS — R10.32 LLQ PAIN: ICD-10-CM

## 2018-10-10 DIAGNOSIS — K57.92 DIVERTICULITIS: ICD-10-CM

## 2018-10-10 PROCEDURE — 82565 ASSAY OF CREATININE: CPT

## 2018-10-10 PROCEDURE — 74177 CT ABD & PELVIS W/CONTRAST: CPT | Performed by: INTERNAL MEDICINE

## 2018-10-11 NOTE — PROGRESS NOTES
Kidney function was okay    Brandie Lizarraga MD  [facility] Gastroenterology

## 2018-10-11 NOTE — PROGRESS NOTES
CT scan was okay, no fistula or active inflammation was seen    As we talked about today on the phone and in the office, I will send a prescription for the antibiotics (flagyl 500 mg 3x/day and ciprofloxacin 500 mg 2x/day -- both for 2 weeks).     Please le

## 2018-10-13 DIAGNOSIS — I10 ESSENTIAL HYPERTENSION, BENIGN: ICD-10-CM

## 2018-10-15 RX ORDER — HYDROCHLOROTHIAZIDE 25 MG/1
TABLET ORAL
Qty: 90 TABLET | Refills: 0 | Status: SHIPPED | OUTPATIENT
Start: 2018-10-15 | End: 2019-01-29

## 2018-10-18 PROCEDURE — 81001 URINALYSIS AUTO W/SCOPE: CPT | Performed by: INTERNAL MEDICINE

## 2018-10-23 ENCOUNTER — OFFICE VISIT (OUTPATIENT)
Dept: INTERNAL MEDICINE CLINIC | Facility: CLINIC | Age: 48
End: 2018-10-23
Payer: COMMERCIAL

## 2018-10-23 ENCOUNTER — LAB ENCOUNTER (OUTPATIENT)
Dept: LAB | Age: 48
End: 2018-10-23
Attending: INTERNAL MEDICINE
Payer: COMMERCIAL

## 2018-10-23 VITALS
BODY MASS INDEX: 27.99 KG/M2 | RESPIRATION RATE: 16 BRPM | OXYGEN SATURATION: 98 % | HEART RATE: 58 BPM | TEMPERATURE: 99 F | DIASTOLIC BLOOD PRESSURE: 74 MMHG | HEIGHT: 65 IN | WEIGHT: 168 LBS | SYSTOLIC BLOOD PRESSURE: 118 MMHG

## 2018-10-23 DIAGNOSIS — R39.15 URGENCY OF URINATION: Primary | ICD-10-CM

## 2018-10-23 DIAGNOSIS — R10.9 ABDOMINAL PAIN, UNSPECIFIED ABDOMINAL LOCATION: ICD-10-CM

## 2018-10-23 DIAGNOSIS — R19.7 DIARRHEA, UNSPECIFIED TYPE: ICD-10-CM

## 2018-10-23 DIAGNOSIS — J30.2 SEASONAL ALLERGIC RHINITIS, UNSPECIFIED TRIGGER: ICD-10-CM

## 2018-10-23 DIAGNOSIS — R10.9 LEFT SIDED ABDOMINAL PAIN: ICD-10-CM

## 2018-10-23 PROCEDURE — 80048 BASIC METABOLIC PNL TOTAL CA: CPT

## 2018-10-23 PROCEDURE — 85025 COMPLETE CBC W/AUTO DIFF WBC: CPT

## 2018-10-23 PROCEDURE — 81003 URINALYSIS AUTO W/O SCOPE: CPT | Performed by: PHYSICIAN ASSISTANT

## 2018-10-23 PROCEDURE — 86140 C-REACTIVE PROTEIN: CPT

## 2018-10-23 PROCEDURE — 87086 URINE CULTURE/COLONY COUNT: CPT | Performed by: PHYSICIAN ASSISTANT

## 2018-10-23 PROCEDURE — 36415 COLL VENOUS BLD VENIPUNCTURE: CPT

## 2018-10-23 PROCEDURE — 85652 RBC SED RATE AUTOMATED: CPT

## 2018-10-23 PROCEDURE — 80076 HEPATIC FUNCTION PANEL: CPT

## 2018-10-23 PROCEDURE — 99214 OFFICE O/P EST MOD 30 MIN: CPT | Performed by: PHYSICIAN ASSISTANT

## 2018-10-23 NOTE — PATIENT INSTRUCTIONS
Stop singulair for now and monitor abdominal pain  We will call with urine culture results  Follow up with gastroenterologist as planned.

## 2018-10-23 NOTE — PROGRESS NOTES
HPI:    Patient ID: Sierra Louise is a 46year old female. Patient presents with:  Pain: left side pain x3 weeks. Abdominal Pain   This is a new problem. Episode onset: 3w. The onset quality is sudden. The problem occurs daily.  The problem has b (PROBIOTIC OR) Take by mouth. Disp:  Rfl:    Butalbital-APAP-Caffeine -40 MG Oral Cap Take 1 capsule by mouth every 4 (four) hours as needed for Pain.  Disp: 84 capsule Rfl: 0     Allergies:No Known Allergies   PHYSICAL EXAM:   Physical Exam   Nursing URINE-COLOR YELLOW Yellow    Multistix Lot# [id number] Numeric    Multistix Expiration Date 5/31/18 Date   ]       ASSESSMENT/PLAN:   Urgency of urination  (primary encounter diagnosis)- no other urinary symptoms currently. UA + leuks, Check culture.    Left s

## 2018-10-24 NOTE — PROGRESS NOTES
Inflammatory marker (the \"CRP\" and \"ESR\"), blood count, liver function tests were all okay    Electrolytes have mildly low potassium, similar to prior values, but please follow up with Dr Low Crowe about this finding    MD Brandy [employer]

## 2018-10-25 ENCOUNTER — LAB ENCOUNTER (OUTPATIENT)
Dept: LAB | Age: 48
End: 2018-10-25
Attending: INTERNAL MEDICINE
Payer: COMMERCIAL

## 2018-10-25 DIAGNOSIS — R19.7 DIARRHEA, UNSPECIFIED TYPE: ICD-10-CM

## 2018-10-25 DIAGNOSIS — R10.9 ABDOMINAL PAIN, UNSPECIFIED ABDOMINAL LOCATION: ICD-10-CM

## 2018-10-25 PROCEDURE — 87177 OVA AND PARASITES SMEARS: CPT

## 2018-10-25 PROCEDURE — 87077 CULTURE AEROBIC IDENTIFY: CPT

## 2018-10-25 PROCEDURE — 87493 C DIFF AMPLIFIED PROBE: CPT

## 2018-10-25 PROCEDURE — 87272 CRYPTOSPORIDIUM AG IF: CPT

## 2018-10-25 PROCEDURE — 87045 FECES CULTURE AEROBIC BACT: CPT

## 2018-10-25 PROCEDURE — 87329 GIARDIA AG IA: CPT

## 2018-10-25 PROCEDURE — 87207 SMEAR SPECIAL STAIN: CPT

## 2018-10-25 PROCEDURE — 87015 SPECIMEN INFECT AGNT CONCNTJ: CPT

## 2018-10-25 PROCEDURE — 83993 ASSAY FOR CALPROTECTIN FECAL: CPT

## 2018-10-25 PROCEDURE — 87046 STOOL CULTR AEROBIC BACT EA: CPT

## 2018-10-25 PROCEDURE — 87209 SMEAR COMPLEX STAIN: CPT

## 2018-10-26 NOTE — PROGRESS NOTES
Stool test for cyclospora was normal    Jillian Levine MD  [address] [place] [address] Gastroenterology

## 2018-10-30 NOTE — PROGRESS NOTES
Stool for ova/parasites were negative    Yomi Ludwig MD  [facility] Gastroenterology

## 2018-12-14 ENCOUNTER — TELEPHONE (OUTPATIENT)
Dept: INTERNAL MEDICINE CLINIC | Facility: CLINIC | Age: 48
End: 2018-12-14

## 2018-12-14 NOTE — TELEPHONE ENCOUNTER
The pt was seen on 10/23/2018 by Nir Lidnsey also C/o lower left sided pain. The pt was evaluated by GI and the plan was to have a sigmoidoscopy if the symptoms persisted. The pt also discontinued the Singulair and was instructed to monitor the pain.      The pt

## 2018-12-14 NOTE — TELEPHONE ENCOUNTER
Patient says she is having recurring L side abdomen pain yesterady and today; please c/b to advise what she should do.

## 2018-12-31 RX ORDER — ALPRAZOLAM 0.25 MG/1
TABLET ORAL
Qty: 30 TABLET | Refills: 0 | Status: SHIPPED
Start: 2018-12-31 | End: 2019-06-19

## 2019-01-07 PROCEDURE — 81001 URINALYSIS AUTO W/SCOPE: CPT | Performed by: INTERNAL MEDICINE

## 2019-01-08 ENCOUNTER — TELEPHONE (OUTPATIENT)
Dept: INTERNAL MEDICINE CLINIC | Facility: CLINIC | Age: 49
End: 2019-01-08

## 2019-01-08 DIAGNOSIS — E87.6 HYPOKALEMIA: Primary | ICD-10-CM

## 2019-01-08 DIAGNOSIS — E87.6 HYPOKALEMIA: ICD-10-CM

## 2019-01-08 DIAGNOSIS — N30.00 ACUTE CYSTITIS WITHOUT HEMATURIA: ICD-10-CM

## 2019-01-08 RX ORDER — NITROFURANTOIN 25; 75 MG/1; MG/1
100 CAPSULE ORAL 2 TIMES DAILY
Qty: 10 CAPSULE | Refills: 0 | Status: SHIPPED | OUTPATIENT
Start: 2019-01-08 | End: 2019-01-13

## 2019-01-08 RX ORDER — POTASSIUM CHLORIDE 20 MEQ/1
TABLET, EXTENDED RELEASE ORAL
Qty: 90 TABLET | Refills: 0 | Status: SHIPPED | OUTPATIENT
Start: 2019-01-08 | End: 2019-04-07

## 2019-01-08 RX ORDER — POTASSIUM CHLORIDE 1500 MG/1
20 TABLET, FILM COATED, EXTENDED RELEASE ORAL DAILY
Qty: 30 TABLET | Refills: 0 | Status: SHIPPED | OUTPATIENT
Start: 2019-01-08 | End: 2019-01-08

## 2019-01-08 NOTE — TELEPHONE ENCOUNTER
Left message for the pt to give the office a call back. Dr. Chastity Eason has reviewed the pt's labs. Ok to treat with macrobid 100mg bid for 5 days. The pt is currently on HCTZ 25mg daily.  The pt is to take potassium 20 MEQ daily and have potassium recheck

## 2019-01-08 NOTE — TELEPHONE ENCOUNTER
Patient called and stated she completed testing for UTI at the GI, and was advised to call PCP. She requested a call back, and wants to know if the call could be done over phone, or if she needs to be seen. Please advise.

## 2019-01-29 DIAGNOSIS — I10 ESSENTIAL HYPERTENSION, BENIGN: ICD-10-CM

## 2019-01-29 RX ORDER — HYDROCHLOROTHIAZIDE 25 MG/1
TABLET ORAL
Qty: 90 TABLET | Refills: 1 | Status: SHIPPED | OUTPATIENT
Start: 2019-01-29 | End: 2019-07-26

## 2019-02-27 ENCOUNTER — OFFICE VISIT (OUTPATIENT)
Dept: INTERNAL MEDICINE CLINIC | Facility: CLINIC | Age: 49
End: 2019-02-27
Payer: COMMERCIAL

## 2019-02-27 VITALS
TEMPERATURE: 98 F | DIASTOLIC BLOOD PRESSURE: 82 MMHG | HEART RATE: 78 BPM | BODY MASS INDEX: 25.49 KG/M2 | WEIGHT: 153 LBS | SYSTOLIC BLOOD PRESSURE: 112 MMHG | HEIGHT: 65 IN

## 2019-02-27 DIAGNOSIS — R39.15 URINARY URGENCY: ICD-10-CM

## 2019-02-27 DIAGNOSIS — N30.00 ACUTE CYSTITIS WITHOUT HEMATURIA: Primary | ICD-10-CM

## 2019-02-27 DIAGNOSIS — R35.0 URINARY FREQUENCY: ICD-10-CM

## 2019-02-27 LAB
GLUCOSE (URINE DIPSTICK): NEGATIVE MG/DL
KETONES (URINE DIPSTICK): 40 MG/DL
MULTISTIX LOT#: NORMAL NUMERIC
NITRITE, URINE: NEGATIVE
PH, URINE: 5.5 (ref 4.5–8)
PROTEIN (URINE DIPSTICK): NEGATIVE MG/DL
SPECIFIC GRAVITY: 1.02 (ref 1–1.03)
UROBILINOGEN,SEMI-QN: 0.2 MG/DL (ref 0–1.9)

## 2019-02-27 PROCEDURE — 99213 OFFICE O/P EST LOW 20 MIN: CPT | Performed by: PHYSICIAN ASSISTANT

## 2019-02-27 PROCEDURE — 81003 URINALYSIS AUTO W/O SCOPE: CPT | Performed by: PHYSICIAN ASSISTANT

## 2019-02-27 PROCEDURE — 87086 URINE CULTURE/COLONY COUNT: CPT | Performed by: PHYSICIAN ASSISTANT

## 2019-02-27 RX ORDER — NITROFURANTOIN 25; 75 MG/1; MG/1
100 CAPSULE ORAL 2 TIMES DAILY
Qty: 10 CAPSULE | Refills: 0 | Status: SHIPPED | OUTPATIENT
Start: 2019-02-27 | End: 2019-05-13

## 2019-02-27 NOTE — PROGRESS NOTES
Mallorie Jama is a 46year old female. HPI:   Patient presents with symptoms of UTI for 2 days. Complaining of urinary frequency, urgency, and left sided abdominal discomfort.  She describes the discomfort as intermittent, worse at night, different conditions    • Other malaise and fatigue    • Pap smear for cervical cancer screening 1-    wnl pt stated   • Unspecified constipation    • Unspecified essential hypertension    • Visual impairment     wears contact lenses and glasses      Social H

## 2019-02-27 NOTE — PATIENT INSTRUCTIONS
Take antibiotic as directed until completely finished. Contact us if you experience any adverse reaction to the medication.       Continue azo if needed

## 2019-04-07 DIAGNOSIS — E87.6 HYPOKALEMIA: ICD-10-CM

## 2019-04-08 RX ORDER — POTASSIUM CHLORIDE 20 MEQ/1
TABLET, EXTENDED RELEASE ORAL
Qty: 90 TABLET | Refills: 1 | Status: SHIPPED | OUTPATIENT
Start: 2019-04-08 | End: 2019-05-13

## 2019-05-13 ENCOUNTER — OFFICE VISIT (OUTPATIENT)
Dept: INTERNAL MEDICINE CLINIC | Facility: CLINIC | Age: 49
End: 2019-05-13
Payer: COMMERCIAL

## 2019-05-13 VITALS
TEMPERATURE: 98 F | HEIGHT: 65 IN | BODY MASS INDEX: 25.49 KG/M2 | RESPIRATION RATE: 16 BRPM | OXYGEN SATURATION: 97 % | WEIGHT: 153 LBS | HEART RATE: 84 BPM | SYSTOLIC BLOOD PRESSURE: 116 MMHG | DIASTOLIC BLOOD PRESSURE: 72 MMHG

## 2019-05-13 DIAGNOSIS — J02.9 SORE THROAT: Primary | ICD-10-CM

## 2019-05-13 PROCEDURE — 87880 STREP A ASSAY W/OPTIC: CPT | Performed by: NURSE PRACTITIONER

## 2019-05-13 PROCEDURE — 99213 OFFICE O/P EST LOW 20 MIN: CPT | Performed by: NURSE PRACTITIONER

## 2019-05-13 NOTE — PROGRESS NOTES
HPI:    Patient ID: Joann Gilford is a 46year old female. Patient presents with:  Sore Throat: cough, post nasal drip, sore throat x1 day      Sore Throat    This is a new problem. The current episode started yesterday.  The problem has been Micheline • CHOLECYSTECTOMY  1998   • COLONOSCOPY     • COLONOSCOPY  09/2017    hp polyp, diverticulosis.  repeat 10 year   • COLONOSCOPY, POSSIBLE BIOPSY, POSSIBLE POLYPECTOMY 04910 N/A 9/27/2017    Performed by Verito Fulton MD at [id number] [place] [address] loratadine 10 MG Oral Tab Take 10 mg by mouth daily. Disp:  Rfl:    Probiotic Product (PROBIOTIC OR) Take by mouth. Disp:  Rfl:    Butalbital-APAP-Caffeine -40 MG Oral Cap Take 1 capsule by mouth every 4 (four) hours as needed for Pain.  Disp: 84 caps Constitutional: She appears well-developed and well-nourished. HENT:   Right Ear: Tympanic membrane and ear canal normal.   Left Ear: Tympanic membrane and ear canal normal.   Nose: Nose normal. No rhinorrhea.    Mouth/Throat: Uvula is midline and mucous Over-the-counter medicine can reduce sore throat symptoms. Ask your pharmacist if you have questions about which medicine to use:  · Ease pain with anesthetic sprays. Aspirin or an aspirin substitute also helps.  Remember, never give aspirin to anyone 18 or

## 2019-05-20 ENCOUNTER — MED REC SCAN ONLY (OUTPATIENT)
Dept: INTERNAL MEDICINE CLINIC | Facility: CLINIC | Age: 49
End: 2019-05-20

## 2019-06-20 RX ORDER — ALPRAZOLAM 0.25 MG/1
TABLET ORAL
Qty: 30 TABLET | Refills: 0 | Status: SHIPPED
Start: 2019-06-20 | End: 2019-10-22

## 2019-07-26 DIAGNOSIS — I10 ESSENTIAL HYPERTENSION, BENIGN: ICD-10-CM

## 2019-07-26 RX ORDER — HYDROCHLOROTHIAZIDE 25 MG/1
TABLET ORAL
Qty: 90 TABLET | Refills: 0 | Status: SHIPPED | OUTPATIENT
Start: 2019-07-26 | End: 2019-10-19

## 2019-09-16 ENCOUNTER — OFFICE VISIT (OUTPATIENT)
Dept: INTERNAL MEDICINE CLINIC | Facility: CLINIC | Age: 49
End: 2019-09-16
Payer: COMMERCIAL

## 2019-09-16 VITALS
TEMPERATURE: 99 F | SYSTOLIC BLOOD PRESSURE: 110 MMHG | HEART RATE: 80 BPM | DIASTOLIC BLOOD PRESSURE: 70 MMHG | RESPIRATION RATE: 16 BRPM | HEIGHT: 65 IN | WEIGHT: 164 LBS | BODY MASS INDEX: 27.32 KG/M2

## 2019-09-16 DIAGNOSIS — N30.00 ACUTE CYSTITIS WITHOUT HEMATURIA: Primary | ICD-10-CM

## 2019-09-16 PROCEDURE — 99213 OFFICE O/P EST LOW 20 MIN: CPT | Performed by: PHYSICIAN ASSISTANT

## 2019-09-16 PROCEDURE — 87086 URINE CULTURE/COLONY COUNT: CPT | Performed by: PHYSICIAN ASSISTANT

## 2019-09-16 RX ORDER — NITROFURANTOIN 25; 75 MG/1; MG/1
100 CAPSULE ORAL 2 TIMES DAILY
Qty: 10 CAPSULE | Refills: 0 | Status: SHIPPED | OUTPATIENT
Start: 2019-09-16 | End: 2019-12-15 | Stop reason: ALTCHOICE

## 2019-09-16 NOTE — PROGRESS NOTES
Valentina Hsu is a 44year old female. HPI:   Patient presents with symptoms of UTI for 2 days. Complaining of urinary frequency, urgency, dysuria. Denies back pain, fever, hematuria. Has tried azo otc for treatment.        Current Outpatient Medi Yes      Alcohol/week: 0.0 - 0.8 standard drinks      Frequency: 2-4 times a month      Drinks per session: 1 or 2    Drug use: No        REVIEW OF SYSTEMS:   GENERAL HEALTH: feels well otherwise  SKIN: denies any unusual skin lesions or rashes  RESPIRATOR

## 2019-09-16 NOTE — PATIENT INSTRUCTIONS
Take antibiotic as directed until completely finished. Contact us if you experience any adverse reaction to the medication.   We will send culture results when they are available via [employer]

## 2019-10-08 ENCOUNTER — TELEPHONE (OUTPATIENT)
Dept: INTERNAL MEDICINE CLINIC | Facility: CLINIC | Age: 49
End: 2019-10-08

## 2019-10-08 DIAGNOSIS — S92.901A UNSPECIFIED FRACTURE OF RIGHT FOOT, INITIAL ENCOUNTER FOR CLOSED FRACTURE: Primary | ICD-10-CM

## 2019-10-08 NOTE — TELEPHONE ENCOUNTER
AL[address]  [address]  [address]      The  states has had a known right foot fracture in May.  The pt was receiving care from [facility] and

## 2019-10-19 DIAGNOSIS — I10 ESSENTIAL HYPERTENSION, BENIGN: ICD-10-CM

## 2019-10-21 RX ORDER — HYDROCHLOROTHIAZIDE 25 MG/1
TABLET ORAL
Qty: 90 TABLET | Refills: 1 | Status: SHIPPED | OUTPATIENT
Start: 2019-10-21 | End: 2020-08-12

## 2019-12-15 ENCOUNTER — OFFICE VISIT (OUTPATIENT)
Dept: FAMILY MEDICINE CLINIC | Facility: CLINIC | Age: 49
End: 2019-12-15
Payer: COMMERCIAL

## 2019-12-15 VITALS
SYSTOLIC BLOOD PRESSURE: 138 MMHG | DIASTOLIC BLOOD PRESSURE: 68 MMHG | HEART RATE: 72 BPM | OXYGEN SATURATION: 98 % | HEIGHT: 65 IN | TEMPERATURE: 98 F | WEIGHT: 164.19 LBS | RESPIRATION RATE: 14 BRPM | BODY MASS INDEX: 27.36 KG/M2

## 2019-12-15 DIAGNOSIS — N30.01 ACUTE CYSTITIS WITH HEMATURIA: Primary | ICD-10-CM

## 2019-12-15 DIAGNOSIS — R39.9 UTI SYMPTOMS: ICD-10-CM

## 2019-12-15 PROCEDURE — 81003 URINALYSIS AUTO W/O SCOPE: CPT | Performed by: FAMILY MEDICINE

## 2019-12-15 PROCEDURE — 87086 URINE CULTURE/COLONY COUNT: CPT | Performed by: FAMILY MEDICINE

## 2019-12-15 PROCEDURE — 99213 OFFICE O/P EST LOW 20 MIN: CPT | Performed by: FAMILY MEDICINE

## 2019-12-15 PROCEDURE — 87077 CULTURE AEROBIC IDENTIFY: CPT | Performed by: FAMILY MEDICINE

## 2019-12-15 PROCEDURE — 87186 SC STD MICRODIL/AGAR DIL: CPT | Performed by: FAMILY MEDICINE

## 2019-12-15 RX ORDER — NITROFURANTOIN 25; 75 MG/1; MG/1
100 CAPSULE ORAL 2 TIMES DAILY
Qty: 10 CAPSULE | Refills: 0 | Status: SHIPPED | OUTPATIENT
Start: 2019-12-15 | End: 2020-02-27 | Stop reason: ALTCHOICE

## 2019-12-15 NOTE — PATIENT INSTRUCTIONS
Understanding Urinary Tract Infections (UTIs)  Most UTIs are caused by bacteria, although they may also be caused by viruses or fungi.  Bacteria from the bowel are the most common source of infection. The infection may start because of any of the followin The most common place for an infection is in the bladder. This is called a bladder infection. This is one of the most common infections in women. Most bladder infections are easily treated. They are not serious unless the infection spreads to the kidney. Bladder infections are diagnosed by a urine test. They are treated with antibiotics and usually clear up quickly without complications. Treatment helps prevent a more serious kidney infection.   Medicines  Medicines can help in the treatment of a bladder in Call your healthcare provider if all symptoms are not gone after 3 days of treatment. This is especially important if you have repeat infections. If a culture was done, you will be told if your treatment needs to be changed.  If directed, you can call to f

## 2019-12-15 NOTE — PROGRESS NOTES
Mallorie Jama is a 44year old female. S:  Patient presents today with the following concerns:  UTI (Frequency, urgency X 3 days. Patient denies any lower abdominal pain, blood in urine or dysuria.  No fevers, hx UTI)    Feels like has to urinate bu Resp 14   Ht 65\"   Wt 164 lb 3.2 oz (74.5 kg)   LMP 03/16/2015   SpO2 98%   BMI 27.32 kg/m²   GENERAL: well developed, well nourished,in no apparent distress.   Mood, affect, and behavior are normal.  SKIN: no rashes,no suspicious lesions  HEENT: atraumati

## 2020-01-07 DIAGNOSIS — F41.9 ANXIETY: ICD-10-CM

## 2020-01-07 RX ORDER — ALPRAZOLAM 0.25 MG/1
TABLET ORAL
Qty: 20 TABLET | Refills: 0 | Status: SHIPPED | OUTPATIENT
Start: 2020-01-07 | End: 2020-05-18

## 2020-01-07 NOTE — TELEPHONE ENCOUNTER
Last time medication was refilled 10/2019   Quantity  and number of refills 30 with 0 refill    Last office visit 09/2019   Next office visit past due for annual physical since 2018, pt contacted and LMOVM to schedule visit.      rx quantity adjusted to 20

## 2020-01-24 LAB
AMB EXT CHOLESTEROL, TOTAL: 193 MG/DL
AMB EXT CREATININE: 0.87 MG/DL
AMB EXT GLUCOSE: 96 MG/DL
AMB EXT HDL CHOLESTEROL: 52 MG/DL
AMB EXT HEMATOCRIT: 46.4
AMB EXT HEMOGLOBIN: 15.3
AMB EXT LDL CHOLESTEROL, DIRECT: 109 MG/DL
AMB EXT PLATELETS: 286
AMB EXT TRIGLYCERIDES: 161 MG/DL
AMB EXT WBC: 6.5 X10(3)UL

## 2020-01-25 ENCOUNTER — MED REC SCAN ONLY (OUTPATIENT)
Dept: INTERNAL MEDICINE CLINIC | Facility: CLINIC | Age: 50
End: 2020-01-25

## 2020-02-27 ENCOUNTER — OFFICE VISIT (OUTPATIENT)
Dept: FAMILY MEDICINE CLINIC | Facility: CLINIC | Age: 50
End: 2020-02-27
Payer: COMMERCIAL

## 2020-02-27 VITALS
DIASTOLIC BLOOD PRESSURE: 84 MMHG | HEART RATE: 70 BPM | OXYGEN SATURATION: 100 % | TEMPERATURE: 98 F | WEIGHT: 165 LBS | RESPIRATION RATE: 16 BRPM | BODY MASS INDEX: 27.49 KG/M2 | HEIGHT: 65 IN | SYSTOLIC BLOOD PRESSURE: 120 MMHG

## 2020-02-27 DIAGNOSIS — L20.9 ATOPIC DERMATITIS, UNSPECIFIED TYPE: ICD-10-CM

## 2020-02-27 DIAGNOSIS — R39.9 UTI SYMPTOMS: Primary | ICD-10-CM

## 2020-02-27 LAB
APPEARANCE: CLEAR
MULTISTIX LOT#: NORMAL NUMERIC
PH, URINE: 6.5 (ref 4.5–8)
SPECIFIC GRAVITY: 1.01 (ref 1–1.03)
URINE-COLOR: YELLOW
UROBILINOGEN,SEMI-QN: 0.2 MG/DL (ref 0–1.9)

## 2020-02-27 PROCEDURE — 81003 URINALYSIS AUTO W/O SCOPE: CPT | Performed by: NURSE PRACTITIONER

## 2020-02-27 PROCEDURE — 99213 OFFICE O/P EST LOW 20 MIN: CPT | Performed by: NURSE PRACTITIONER

## 2020-02-27 PROCEDURE — 87086 URINE CULTURE/COLONY COUNT: CPT | Performed by: NURSE PRACTITIONER

## 2020-02-27 RX ORDER — NITROFURANTOIN 25; 75 MG/1; MG/1
100 CAPSULE ORAL 2 TIMES DAILY
Qty: 14 CAPSULE | Refills: 0 | Status: SHIPPED | OUTPATIENT
Start: 2020-02-27 | End: 2020-03-05

## 2020-02-27 NOTE — PROGRESS NOTES
CHIEF COMPLAINT:   Patient presents with:  Urinary Frequency: urgency, odor x  yesterday   Rash: eczema popping up on lower back x 1 month       HPI:   Hank Garcia is a 44year old female who presents with symptoms of UTI.  Complaining of urinary f 1-7-2014    benign   • Headache(784.0)    • High blood pressure    • Other abnormal blood chemistry    • Other atopic dermatitis and related conditions    • Other malaise and fatigue    • Pap smear for cervical cancer screening 1-    wnl pt stated mg/dL    Nitrite Urine neg Negative    Leukocyte Esterase Urine mod Negative    APPEARANCE clear Clear    Color Urine yellow Yellow    Multistix Lot# [id number] Numeric    Multistix Expiration Date 03/31/21 Date         ASSESSMENT AND PLAN:   Angela File

## 2020-05-17 DIAGNOSIS — F41.9 ANXIETY: ICD-10-CM

## 2020-05-18 ENCOUNTER — TELEPHONE (OUTPATIENT)
Dept: INTERNAL MEDICINE CLINIC | Facility: CLINIC | Age: 50
End: 2020-05-18

## 2020-05-18 DIAGNOSIS — Z12.31 ENCOUNTER FOR SCREENING MAMMOGRAM FOR MALIGNANT NEOPLASM OF BREAST: Primary | ICD-10-CM

## 2020-05-18 RX ORDER — ALPRAZOLAM 0.25 MG/1
TABLET ORAL
Qty: 20 TABLET | Refills: 0 | Status: SHIPPED | OUTPATIENT
Start: 2020-05-18 | End: 2020-06-15

## 2020-05-27 ENCOUNTER — HOSPITAL ENCOUNTER (OUTPATIENT)
Dept: MAMMOGRAPHY | Age: 50
Discharge: HOME | End: 2020-05-27
Attending: INTERNAL MEDICINE
Payer: COMMERCIAL

## 2020-05-27 ENCOUNTER — OFFICE VISIT (OUTPATIENT)
Dept: INTERNAL MEDICINE CLINIC | Facility: CLINIC | Age: 50
End: 2020-05-27
Payer: COMMERCIAL

## 2020-05-27 VITALS
SYSTOLIC BLOOD PRESSURE: 118 MMHG | RESPIRATION RATE: 18 BRPM | WEIGHT: 165 LBS | DIASTOLIC BLOOD PRESSURE: 70 MMHG | HEART RATE: 67 BPM | TEMPERATURE: 99 F | HEIGHT: 65 IN | BODY MASS INDEX: 27.49 KG/M2 | OXYGEN SATURATION: 97 %

## 2020-05-27 DIAGNOSIS — I10 ESSENTIAL HYPERTENSION, BENIGN: ICD-10-CM

## 2020-05-27 DIAGNOSIS — Z00.00 ANNUAL PHYSICAL EXAM: Primary | ICD-10-CM

## 2020-05-27 DIAGNOSIS — Z12.31 ENCOUNTER FOR SCREENING MAMMOGRAM FOR MALIGNANT NEOPLASM OF BREAST: ICD-10-CM

## 2020-05-27 DIAGNOSIS — R39.9 UTI SYMPTOMS: ICD-10-CM

## 2020-05-27 DIAGNOSIS — F41.9 ANXIETY: ICD-10-CM

## 2020-05-27 DIAGNOSIS — R10.32 LLQ PAIN: ICD-10-CM

## 2020-05-27 PROCEDURE — 77063 BREAST TOMOSYNTHESIS BI: CPT | Performed by: INTERNAL MEDICINE

## 2020-05-27 PROCEDURE — 99396 PREV VISIT EST AGE 40-64: CPT | Performed by: NURSE PRACTITIONER

## 2020-05-27 PROCEDURE — 77067 SCR MAMMO BI INCL CAD: CPT | Performed by: INTERNAL MEDICINE

## 2020-05-27 NOTE — PROGRESS NOTES
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 She is using xanax at night to help with sleep which is helping.  Pain in left ovary area lenses and glasses     Past Surgical History:   Procedure Laterality Date   • CHOLECYSTECTOMY  1998   • COLONOSCOPY     • COLONOSCOPY  09/2017    hp polyp, diverticulosis.  repeat 10 year   • COLONOSCOPY, POSSIBLE BIOPSY, POSSIBLE POLYPECTOMY 73394 N/A 9/27 disturbance. Respiratory: Negative for cough and shortness of breath. Cardiovascular: Negative for chest pain and palpitations. Gastrointestinal: Negative for nausea, vomiting, and diarrhea.  LLQ dull ache  Genitourinary: + urgency, frequency of urin is normal.     Assessment and Plan:  Natalya Glez is a 44year old female here for a wellness exam.  Age appropriate cancer screening, labs, safety, immunizations were discussed with the patient and ordered as follows:     Anxiety- use xanax at night

## 2020-05-30 ENCOUNTER — HOSPITAL ENCOUNTER (OUTPATIENT)
Dept: ULTRASOUND IMAGING | Age: 50
Discharge: HOME | End: 2020-05-30
Attending: NURSE PRACTITIONER
Payer: COMMERCIAL

## 2020-05-30 DIAGNOSIS — R39.9 UTI SYMPTOMS: ICD-10-CM

## 2020-05-30 DIAGNOSIS — R10.32 LLQ PAIN: ICD-10-CM

## 2020-05-30 PROCEDURE — 76830 TRANSVAGINAL US NON-OB: CPT | Performed by: NURSE PRACTITIONER

## 2020-05-30 PROCEDURE — 76770 US EXAM ABDO BACK WALL COMP: CPT | Performed by: NURSE PRACTITIONER

## 2020-05-30 PROCEDURE — 76856 US EXAM PELVIC COMPLETE: CPT | Performed by: NURSE PRACTITIONER

## 2020-06-01 ENCOUNTER — TELEPHONE (OUTPATIENT)
Dept: INTERNAL MEDICINE CLINIC | Facility: CLINIC | Age: 50
End: 2020-06-01

## 2020-06-01 DIAGNOSIS — R39.9 UTI SYMPTOMS: Primary | ICD-10-CM

## 2020-06-01 NOTE — TELEPHONE ENCOUNTER
----- Message from JESSICA Novak sent at 6/1/2020  8:01 AM CDT -----  Results reviewed. Tests show no significant abnormalities. Please inform patient.  Recommend consult with urology Dr. Ute Ceron for further evaluation

## 2020-06-01 NOTE — TELEPHONE ENCOUNTER
[facility]    [address]  [facility] [place] [phone]  Results and recommendations d/w pt she expressed understanding. Referral information sent via [email or web address] per pt request.    Referral placed.

## 2020-06-14 DIAGNOSIS — F41.9 ANXIETY: ICD-10-CM

## 2020-06-15 RX ORDER — ALPRAZOLAM 0.25 MG/1
TABLET ORAL
Qty: 20 TABLET | Refills: 1 | Status: SHIPPED | OUTPATIENT
Start: 2020-06-15 | End: 2020-07-29

## 2020-07-29 ENCOUNTER — OFFICE VISIT (OUTPATIENT)
Dept: INTERNAL MEDICINE CLINIC | Facility: CLINIC | Age: 50
End: 2020-07-29
Payer: COMMERCIAL

## 2020-07-29 VITALS
HEART RATE: 90 BPM | SYSTOLIC BLOOD PRESSURE: 122 MMHG | WEIGHT: 165 LBS | BODY MASS INDEX: 27.49 KG/M2 | OXYGEN SATURATION: 98 % | HEIGHT: 65 IN | TEMPERATURE: 99 F | DIASTOLIC BLOOD PRESSURE: 80 MMHG | RESPIRATION RATE: 16 BRPM

## 2020-07-29 DIAGNOSIS — R31.9 URINARY TRACT INFECTION WITH HEMATURIA, SITE UNSPECIFIED: ICD-10-CM

## 2020-07-29 DIAGNOSIS — N30.00 ACUTE RECURRENT CYSTITIS: Primary | ICD-10-CM

## 2020-07-29 DIAGNOSIS — F41.9 ANXIETY: ICD-10-CM

## 2020-07-29 DIAGNOSIS — N39.0 URINARY TRACT INFECTION WITH HEMATURIA, SITE UNSPECIFIED: ICD-10-CM

## 2020-07-29 DIAGNOSIS — R35.0 FREQUENT URINATION: ICD-10-CM

## 2020-07-29 LAB
GLUCOSE (URINE DIPSTICK): 250 MG/DL
MULTISTIX LOT#: ABNORMAL NUMERIC
NITRITE, URINE: POSITIVE
PH, URINE: 5.5 (ref 4.5–8)
PROTEIN (URINE DIPSTICK): 100 MG/DL
SPECIFIC GRAVITY: 1.01 (ref 1–1.03)
UROBILINOGEN,SEMI-QN: 4 MG/DL (ref 0–1.9)

## 2020-07-29 PROCEDURE — 3008F BODY MASS INDEX DOCD: CPT | Performed by: PHYSICIAN ASSISTANT

## 2020-07-29 PROCEDURE — 99213 OFFICE O/P EST LOW 20 MIN: CPT | Performed by: PHYSICIAN ASSISTANT

## 2020-07-29 PROCEDURE — 81003 URINALYSIS AUTO W/O SCOPE: CPT | Performed by: PHYSICIAN ASSISTANT

## 2020-07-29 PROCEDURE — 87086 URINE CULTURE/COLONY COUNT: CPT | Performed by: PHYSICIAN ASSISTANT

## 2020-07-29 PROCEDURE — 3074F SYST BP LT 130 MM HG: CPT | Performed by: PHYSICIAN ASSISTANT

## 2020-07-29 PROCEDURE — 3079F DIAST BP 80-89 MM HG: CPT | Performed by: PHYSICIAN ASSISTANT

## 2020-07-29 RX ORDER — NITROFURANTOIN 25; 75 MG/1; MG/1
100 CAPSULE ORAL 2 TIMES DAILY
Qty: 14 CAPSULE | Refills: 0 | Status: SHIPPED | OUTPATIENT
Start: 2020-07-29 | End: 2020-10-19

## 2020-07-29 RX ORDER — ALPRAZOLAM 0.25 MG/1
0.25 TABLET ORAL EVERY 6 HOURS PRN
Qty: 30 TABLET | Refills: 0 | COMMUNITY
Start: 2020-07-29 | End: 2020-08-12

## 2020-07-29 RX ORDER — FLUTICASONE PROPIONATE 50 MCG
1 SPRAY, SUSPENSION (ML) NASAL DAILY
COMMUNITY

## 2020-07-29 RX ORDER — FEXOFENADINE HYDROCHLORIDE 60 MG/1
60 TABLET, FILM COATED ORAL DAILY
COMMUNITY
End: 2021-06-16 | Stop reason: ALTCHOICE

## 2020-07-29 NOTE — PATIENT INSTRUCTIONS
Take antibiotic as directed until completely finished. Contact us if you experience any adverse reaction to the medication.    We will send culture results via [employer]  Schedule follow-up with Dr. Anuja Posada

## 2020-07-29 NOTE — PROGRESS NOTES
Jacinda Jeronimo is a 44year old female. HPI:   Patient presents with symptoms of UTI for 2 days. Complaining of urinary frequency, urgency, dysuria, suprapubic pain  Denies back pain, fever, hematuria.   Has tried D-mannose, cranberry supplement, and otherwise  SKIN: denies any unusual skin lesions or rashes  RESPIRATORY: no shortness of breath with exertion  CARDIOVASCULAR: denies chest pain on exertion  GI: no nausea or vomiting  NEURO: denies headaches    EXAM:   /80   Pulse 90   Temp 98.7 °F

## 2020-08-11 DIAGNOSIS — I10 ESSENTIAL HYPERTENSION, BENIGN: ICD-10-CM

## 2020-08-11 DIAGNOSIS — F41.9 ANXIETY: ICD-10-CM

## 2020-08-12 RX ORDER — HYDROCHLOROTHIAZIDE 25 MG/1
TABLET ORAL
Qty: 90 TABLET | Refills: 1 | Status: SHIPPED | OUTPATIENT
Start: 2020-08-12 | End: 2021-02-18

## 2020-08-12 RX ORDER — ALPRAZOLAM 0.25 MG/1
TABLET ORAL
Qty: 20 TABLET | Refills: 0 | Status: SHIPPED | OUTPATIENT
Start: 2020-08-12 | End: 2020-09-13

## 2020-09-12 DIAGNOSIS — F41.9 ANXIETY: ICD-10-CM

## 2020-09-14 RX ORDER — ALPRAZOLAM 0.25 MG/1
TABLET ORAL
Qty: 20 TABLET | Refills: 2 | Status: SHIPPED | OUTPATIENT
Start: 2020-09-14 | End: 2021-04-28

## 2020-10-19 ENCOUNTER — OFFICE VISIT (OUTPATIENT)
Dept: INTERNAL MEDICINE CLINIC | Facility: CLINIC | Age: 50
End: 2020-10-19
Payer: COMMERCIAL

## 2020-10-19 VITALS
WEIGHT: 167 LBS | RESPIRATION RATE: 16 BRPM | OXYGEN SATURATION: 98 % | HEIGHT: 65 IN | SYSTOLIC BLOOD PRESSURE: 122 MMHG | DIASTOLIC BLOOD PRESSURE: 70 MMHG | BODY MASS INDEX: 27.82 KG/M2 | TEMPERATURE: 98 F | HEART RATE: 70 BPM

## 2020-10-19 DIAGNOSIS — Z23 NEED FOR INFLUENZA VACCINATION: ICD-10-CM

## 2020-10-19 DIAGNOSIS — R39.9 UTI SYMPTOMS: Primary | ICD-10-CM

## 2020-10-19 PROCEDURE — 90471 IMMUNIZATION ADMIN: CPT | Performed by: NURSE PRACTITIONER

## 2020-10-19 PROCEDURE — 99213 OFFICE O/P EST LOW 20 MIN: CPT | Performed by: NURSE PRACTITIONER

## 2020-10-19 PROCEDURE — 87086 URINE CULTURE/COLONY COUNT: CPT | Performed by: NURSE PRACTITIONER

## 2020-10-19 PROCEDURE — 3074F SYST BP LT 130 MM HG: CPT | Performed by: NURSE PRACTITIONER

## 2020-10-19 PROCEDURE — 3078F DIAST BP <80 MM HG: CPT | Performed by: NURSE PRACTITIONER

## 2020-10-19 PROCEDURE — 81003 URINALYSIS AUTO W/O SCOPE: CPT | Performed by: NURSE PRACTITIONER

## 2020-10-19 PROCEDURE — 3008F BODY MASS INDEX DOCD: CPT | Performed by: NURSE PRACTITIONER

## 2020-10-19 PROCEDURE — 90686 IIV4 VACC NO PRSV 0.5 ML IM: CPT | Performed by: NURSE PRACTITIONER

## 2020-10-19 RX ORDER — NITROFURANTOIN MACROCRYSTALS 100 MG/1
100 CAPSULE ORAL 2 TIMES DAILY
Qty: 10 CAPSULE | Refills: 0 | Status: SHIPPED | OUTPATIENT
Start: 2020-10-19 | End: 2021-02-03 | Stop reason: ALTCHOICE

## 2020-10-19 NOTE — PROGRESS NOTES
Jozef Joiner is a 44year old female. HPI:   Patient presents with symptoms of UTI for 1 day. Complaining of urinary frequency, urgency, dysuria, suprapubic pain  Right lower back pain  No fever, hematuria. Has tried AZO for treatment.  Saw Dr. Jasmina Hayes status: Never Smoker      Smokeless tobacco: Never Used    Alcohol use:  Yes      Alcohol/week: 0.0 - 0.8 standard drinks      Frequency: 2-4 times a month      Drinks per session: 1 or 2    Drug use: No        REVIEW OF SYSTEMS:   GENERAL HEALTH: feels wel capsule (100 mg total) by mouth 2 (two) times a day. Instructions given on increasing fluid intake, bladder emptying after intercourse. Decrease bath tub use  The patient indicates understanding of these issues and agrees to the plan.   The patient i

## 2020-10-21 ENCOUNTER — TELEPHONE (OUTPATIENT)
Dept: OBGYN CLINIC | Facility: CLINIC | Age: 50
End: 2020-10-21

## 2020-10-21 NOTE — TELEPHONE ENCOUNTER
Patient called was previous patient of Dr Everardo Mulligan and was told by pcp to see us for reoccuring uti's     Thank you

## 2020-10-21 NOTE — TELEPHONE ENCOUNTER
Patient has not been seen since 03/17/2017. She stated she has been having recurrent UTI's and PCP referred her to urology. Her most recent urine culture came back with no growth.   Advised patient to f/u with urology on 10/30 and if they feel is gyne re

## 2020-11-09 ENCOUNTER — LAB ENCOUNTER (OUTPATIENT)
Dept: LAB | Facility: HOSPITAL | Age: 50
End: 2020-11-09
Attending: PHYSICIAN ASSISTANT
Payer: COMMERCIAL

## 2020-11-09 DIAGNOSIS — N39.0 RECURRENT UTI: ICD-10-CM

## 2020-11-09 DIAGNOSIS — R39.15 URINARY URGENCY: ICD-10-CM

## 2020-11-09 PROCEDURE — 87086 URINE CULTURE/COLONY COUNT: CPT

## 2021-02-03 ENCOUNTER — TELEPHONE (OUTPATIENT)
Dept: INTERNAL MEDICINE CLINIC | Facility: CLINIC | Age: 51
End: 2021-02-03

## 2021-02-03 ENCOUNTER — OFFICE VISIT (OUTPATIENT)
Dept: INTERNAL MEDICINE CLINIC | Facility: CLINIC | Age: 51
End: 2021-02-03
Payer: COMMERCIAL

## 2021-02-03 VITALS
OXYGEN SATURATION: 97 % | TEMPERATURE: 98 F | WEIGHT: 170 LBS | SYSTOLIC BLOOD PRESSURE: 118 MMHG | RESPIRATION RATE: 16 BRPM | DIASTOLIC BLOOD PRESSURE: 74 MMHG | HEART RATE: 72 BPM | BODY MASS INDEX: 28.32 KG/M2 | HEIGHT: 65 IN

## 2021-02-03 DIAGNOSIS — M26.623 BILATERAL TEMPOROMANDIBULAR JOINT PAIN: Primary | ICD-10-CM

## 2021-02-03 PROCEDURE — 99213 OFFICE O/P EST LOW 20 MIN: CPT | Performed by: PHYSICIAN ASSISTANT

## 2021-02-03 PROCEDURE — 3078F DIAST BP <80 MM HG: CPT | Performed by: PHYSICIAN ASSISTANT

## 2021-02-03 PROCEDURE — 3008F BODY MASS INDEX DOCD: CPT | Performed by: PHYSICIAN ASSISTANT

## 2021-02-03 PROCEDURE — 3074F SYST BP LT 130 MM HG: CPT | Performed by: PHYSICIAN ASSISTANT

## 2021-02-03 RX ORDER — NAPROXEN 500 MG/1
500 TABLET ORAL 2 TIMES DAILY WITH MEALS
Qty: 14 TABLET | Refills: 0 | Status: SHIPPED | OUTPATIENT
Start: 2021-02-03 | End: 2021-05-17 | Stop reason: ALTCHOICE

## 2021-02-03 RX ORDER — CYCLOBENZAPRINE HCL 10 MG
10 TABLET ORAL NIGHTLY
Qty: 7 TABLET | Refills: 0 | Status: SHIPPED | OUTPATIENT
Start: 2021-02-03 | End: 2021-05-17 | Stop reason: ALTCHOICE

## 2021-02-03 NOTE — PROGRESS NOTES
Khloe Thomas is a 44year old female. HPI:   Pt presents complaining of bilateral jaw pain x 3 days. States she woke Monday morning with it. Denies trauma. Usually wears Invisalign overnight and did not the night before.  Also associated with incre Smoking status: Never Smoker      Smokeless tobacco: Never Used    Alcohol use:  Yes      Alcohol/week: 0.0 - 0.8 standard drinks      Frequency: 2-4 times a month      Drinks per session: 1 or 2    Drug use: No       REVIEW OF SYSTEMS:   GENERAL HEALTH: fe

## 2021-02-03 NOTE — TELEPHONE ENCOUNTER
Pt calling stating she is experiencing significant jaw pain. Pt states this has gurmeet an ongoing issue for the past 3 days. Pt is inquiring if she should schedule an appointment here or contact her dentist for an appointment. Please advise.

## 2021-02-03 NOTE — TELEPHONE ENCOUNTER
Pt reports Bilateral TMJ pains over the last three days. described as achy and sore, worse with movement. SDA made with Frank Zavala at [phone] for assessment. Pt agrees to time and date of visit.

## 2021-02-09 ENCOUNTER — TELEPHONE (OUTPATIENT)
Dept: INTERNAL MEDICINE CLINIC | Facility: CLINIC | Age: 51
End: 2021-02-09

## 2021-02-09 DIAGNOSIS — Z23 NEED FOR VACCINATION: ICD-10-CM

## 2021-02-09 NOTE — TELEPHONE ENCOUNTER
Patient called and stated she took the Kevin Patel COVID-19 vaccine on Sunday on her left arm, and now there is a large node in her arm pit; please advise. She states it is painful.

## 2021-02-09 NOTE — TELEPHONE ENCOUNTER
Spoke with pt she had her covid vaccine on Sunday and now has a swollen axillary lymph node in the same arm. D/w pt this is a normal reaction. Advised to use OTC advil as needed for pain. If symptoms worsen or fail to improve f/u in office.  She expressed

## 2021-02-18 DIAGNOSIS — I10 ESSENTIAL HYPERTENSION, BENIGN: ICD-10-CM

## 2021-02-18 RX ORDER — HYDROCHLOROTHIAZIDE 25 MG/1
25 TABLET ORAL DAILY
Qty: 90 TABLET | Refills: 0 | Status: SHIPPED | OUTPATIENT
Start: 2021-02-18 | End: 2021-05-24

## 2021-02-18 NOTE — TELEPHONE ENCOUNTER
Last time medication was refilled 8/12/20  Quantity and # of refills 90 tab w/ 1 tab  Last OV 2/3/21  Next OV annual physical due 05/27/21

## 2021-04-07 ENCOUNTER — APPOINTMENT (OUTPATIENT)
Dept: CT IMAGING | Age: 51
End: 2021-04-07
Attending: EMERGENCY MEDICINE
Payer: COMMERCIAL

## 2021-04-07 ENCOUNTER — HOSPITAL ENCOUNTER (EMERGENCY)
Age: 51
Discharge: HOME | End: 2021-04-07
Attending: EMERGENCY MEDICINE
Payer: COMMERCIAL

## 2021-04-07 VITALS
SYSTOLIC BLOOD PRESSURE: 115 MMHG | RESPIRATION RATE: 18 BRPM | TEMPERATURE: 98 F | BODY MASS INDEX: 27.49 KG/M2 | HEART RATE: 72 BPM | HEIGHT: 65 IN | OXYGEN SATURATION: 96 % | DIASTOLIC BLOOD PRESSURE: 74 MMHG | WEIGHT: 165 LBS

## 2021-04-07 DIAGNOSIS — K57.92 ACUTE DIVERTICULITIS: Primary | ICD-10-CM

## 2021-04-07 PROCEDURE — 96374 THER/PROPH/DIAG INJ IV PUSH: CPT

## 2021-04-07 PROCEDURE — 80053 COMPREHEN METABOLIC PANEL: CPT | Performed by: EMERGENCY MEDICINE

## 2021-04-07 PROCEDURE — 74177 CT ABD & PELVIS W/CONTRAST: CPT | Performed by: EMERGENCY MEDICINE

## 2021-04-07 PROCEDURE — 85025 COMPLETE CBC W/AUTO DIFF WBC: CPT | Performed by: EMERGENCY MEDICINE

## 2021-04-07 PROCEDURE — 99284 EMERGENCY DEPT VISIT MOD MDM: CPT

## 2021-04-07 PROCEDURE — 81003 URINALYSIS AUTO W/O SCOPE: CPT | Performed by: EMERGENCY MEDICINE

## 2021-04-07 PROCEDURE — 83690 ASSAY OF LIPASE: CPT | Performed by: EMERGENCY MEDICINE

## 2021-04-07 RX ORDER — METRONIDAZOLE 500 MG/1
500 TABLET ORAL 3 TIMES DAILY
Qty: 30 TABLET | Refills: 0 | Status: SHIPPED | OUTPATIENT
Start: 2021-04-07 | End: 2021-04-17

## 2021-04-07 RX ORDER — CIPROFLOXACIN 500 MG/1
500 TABLET, FILM COATED ORAL 2 TIMES DAILY
Qty: 20 TABLET | Refills: 0 | Status: SHIPPED | OUTPATIENT
Start: 2021-04-07 | End: 2021-04-17

## 2021-04-07 RX ORDER — KETOROLAC TROMETHAMINE 15 MG/ML
15 INJECTION, SOLUTION INTRAMUSCULAR; INTRAVENOUS ONCE
Status: COMPLETED | OUTPATIENT
Start: 2021-04-07 | End: 2021-04-07

## 2021-04-07 RX ORDER — POTASSIUM CHLORIDE 20 MEQ/1
40 TABLET, EXTENDED RELEASE ORAL ONCE
Status: COMPLETED | OUTPATIENT
Start: 2021-04-07 | End: 2021-04-07

## 2021-04-07 NOTE — ED INITIAL ASSESSMENT (HPI)
C/O LLQ ABD PAIN - WAS SEEN 8DAYS AGO AT URGENT CARE - WAS PLACED ON AUGMENTIN - DIAGNOSED WITH UTI AND DIVERTICULITIS

## 2021-04-07 NOTE — ED PROVIDER NOTES
Patient Seen in: THE [facility] Emergency Department In [place]      History   Patient presents with:  Abdominal Pain    Stated Complaint: llq abd pain     HPI/Subjective:   HPI    17-year-old female complaint left lower quadrant abdominal pain.   The patient systems are as noted in HPI. Constitutional and vital signs reviewed. All other systems reviewed and negative except as noted above.     Physical Exam     ED Triage Vitals [04/07/21 0717]   /89   Pulse 95   Resp 16   Temp 98.2 °F (36.8 °C)   Tem LAVENDER   RAINBOW DRAW LIGHT GREEN          CT ABDOMEN+PELVIS(CONTRAST ONLY)(CPT=74177)    Result Date: 4/7/2021  CONCLUSION:  Acute diverticulitis involving the junction of the descending and sigmoid colon.    Dictated by (CST): Osiel Chaudhry MD on 4

## 2021-04-27 DIAGNOSIS — F41.9 ANXIETY: ICD-10-CM

## 2021-04-27 RX ORDER — ALPRAZOLAM 0.25 MG/1
0.25 TABLET ORAL EVERY 6 HOURS PRN
Qty: 20 TABLET | Refills: 2 | OUTPATIENT
Start: 2021-04-27

## 2021-04-28 DIAGNOSIS — F41.9 ANXIETY: ICD-10-CM

## 2021-04-28 RX ORDER — ALPRAZOLAM 0.25 MG/1
0.25 TABLET ORAL EVERY 6 HOURS PRN
Qty: 30 TABLET | Refills: 0 | Status: SHIPPED | OUTPATIENT
Start: 2021-04-28

## 2021-04-28 NOTE — TELEPHONE ENCOUNTER
Last time medication was refilled 9/14/20  Quantity and # of refills 20 tab w/ 2 refills  Last OV 2/3/21  Next OV physical due 5/27/21

## 2021-05-04 ENCOUNTER — OFFICE VISIT (OUTPATIENT)
Dept: SURGERY | Facility: CLINIC | Age: 51
End: 2021-05-04
Payer: COMMERCIAL

## 2021-05-04 VITALS
HEIGHT: 65 IN | DIASTOLIC BLOOD PRESSURE: 85 MMHG | BODY MASS INDEX: 26.66 KG/M2 | SYSTOLIC BLOOD PRESSURE: 114 MMHG | WEIGHT: 160 LBS | TEMPERATURE: 98 F | HEART RATE: 76 BPM

## 2021-05-04 DIAGNOSIS — K57.92 ACUTE DIVERTICULITIS: Primary | ICD-10-CM

## 2021-05-04 DIAGNOSIS — N39.0 RECURRENT URINARY TRACT INFECTION: ICD-10-CM

## 2021-05-04 DIAGNOSIS — Z90.710 HISTORY OF HYSTERECTOMY: ICD-10-CM

## 2021-05-04 DIAGNOSIS — I10 ESSENTIAL HYPERTENSION, BENIGN: ICD-10-CM

## 2021-05-04 PROCEDURE — 3079F DIAST BP 80-89 MM HG: CPT | Performed by: COLON & RECTAL SURGERY

## 2021-05-04 PROCEDURE — 3008F BODY MASS INDEX DOCD: CPT | Performed by: COLON & RECTAL SURGERY

## 2021-05-04 PROCEDURE — 99205 OFFICE O/P NEW HI 60 MIN: CPT | Performed by: COLON & RECTAL SURGERY

## 2021-05-04 PROCEDURE — 3074F SYST BP LT 130 MM HG: CPT | Performed by: COLON & RECTAL SURGERY

## 2021-05-04 NOTE — H&P
New Patient Visit Note       Active Problems      1. Acute diverticulitis    2. Recurrent urinary tract infection    3. Essential hypertension, benign    4.  History of hysterectomy        Chief Complaint   Patient presents with:  Diverticulitis: NP - Ref b that her abdominal pain has resolved. She is no longer having any suprapubic or left lower quadrant pain at this time. The patient has had multiple previous attacks dating back to 2014.   Most of her previous attacks have been left upper quadrant pain, MD MITALI WATSON    My total time spent with this patient on today's visit was 60 minutes.   This includes time in preparation, obtaining and reviewing history, performing the examination, counseling and education, ordering tests, referring and communicating hernia.     URINARY BLADDER:  No visible focal wall thickening, lesion, or calculus.     PELVIC NODES:  No adenopathy.     PELVIC ORGANS:  Sequelae of hysterectomy is noted.    BONES:  No bony lesion or fracture.     LUNG BASES:  No visible pulmonary or ple transverse colon. No free air, free fluid, or well formed abscess. Occasionally microperforated colon cancer can simulate the findings of diverticulitis.  Therefore colonoscopy or air-contrast barium enema should be   considered following the patient's acut cystocele   • TOTAL ABDOM HYSTERECTOMY     • UPPER ARM/ELBOW SURGERY UNLISTED      repair fx of LUE       The family history and social history have been reviewed by me today.     Family History   Problem Relation Age of Onset   • Breast Cancer Paternal Gra 4/7/2021 ), Disp: 7 tablet, Rfl: 0      Review of Systems  The Review of Systems has been reviewed by me during today. Review of Systems   Constitutional: Negative for chills, diaphoresis, fatigue, fever and unexpected weight change.    HENT: Negative for General: Bowel sounds are normal. There is no distension. Palpations: Abdomen is soft. Abdomen is not rigid. There is no shifting dullness, fluid wave or mass. Tenderness: There is no abdominal tenderness. There is no guarding or rebound. returned home. The pain became severe again in the left lower quadrant and suprapubic region and she presented to the emergency department.     At the emergency department last month she had a CT scan performed which I personally reviewed and provided my o denies any personal or secondary family history of colon cancer, uterine cancer, or colon polyps. The patient works as a special education .     Clinical examination of the abdomen reveals it to be soft, nondistended, nontender, bowel sound

## 2021-05-04 NOTE — PATIENT INSTRUCTIONS
The patient presents today in consultation of Dr. Severino Sánchez for recurrent diverticulitis. This patient most recently had an episode of diverticulitis in April 2021. She states this was the worst attack that she has had.   She states that she has had urinar time as well. Please refer to radiologist dictation for the other findings. There was concern on 1 CT scan for air bubbles within the bladder.   The patient is concerned with her recurrent urinary tract infections that these could be related to the dive see the urologist for the cystoscopy and follow-up within the next 2 weeks. The patient should call our office or return sooner with any new or worsening symptoms prior to that point.

## 2021-05-14 ENCOUNTER — LAB ENCOUNTER (OUTPATIENT)
Dept: LAB | Age: 51
End: 2021-05-14
Attending: PHYSICIAN ASSISTANT
Payer: COMMERCIAL

## 2021-05-14 DIAGNOSIS — R39.15 URINARY URGENCY: ICD-10-CM

## 2021-05-14 DIAGNOSIS — N39.0 RECURRENT UTI: ICD-10-CM

## 2021-05-14 PROCEDURE — 87086 URINE CULTURE/COLONY COUNT: CPT

## 2021-05-18 ENCOUNTER — OFFICE VISIT (OUTPATIENT)
Dept: SURGERY | Facility: CLINIC | Age: 51
End: 2021-05-18
Payer: COMMERCIAL

## 2021-05-18 VITALS
WEIGHT: 165 LBS | HEART RATE: 82 BPM | SYSTOLIC BLOOD PRESSURE: 124 MMHG | BODY MASS INDEX: 27.49 KG/M2 | HEIGHT: 65 IN | TEMPERATURE: 98 F | DIASTOLIC BLOOD PRESSURE: 89 MMHG

## 2021-05-18 DIAGNOSIS — I10 ESSENTIAL HYPERTENSION, BENIGN: ICD-10-CM

## 2021-05-18 DIAGNOSIS — K57.92 ACUTE DIVERTICULITIS: Primary | ICD-10-CM

## 2021-05-18 DIAGNOSIS — R39.9 UTI SYMPTOMS: ICD-10-CM

## 2021-05-18 PROCEDURE — 3079F DIAST BP 80-89 MM HG: CPT | Performed by: COLON & RECTAL SURGERY

## 2021-05-18 PROCEDURE — 99213 OFFICE O/P EST LOW 20 MIN: CPT | Performed by: COLON & RECTAL SURGERY

## 2021-05-18 PROCEDURE — 3008F BODY MASS INDEX DOCD: CPT | Performed by: COLON & RECTAL SURGERY

## 2021-05-18 PROCEDURE — 3074F SYST BP LT 130 MM HG: CPT | Performed by: COLON & RECTAL SURGERY

## 2021-05-18 RX ORDER — METRONIDAZOLE 500 MG/1
TABLET ORAL
Qty: 3 TABLET | Refills: 0 | Status: ON HOLD | OUTPATIENT
Start: 2021-05-18 | End: 2021-07-02

## 2021-05-18 RX ORDER — NEOMYCIN SULFATE 500 MG/1
TABLET ORAL
Qty: 6 TABLET | Refills: 0 | Status: ON HOLD | OUTPATIENT
Start: 2021-05-18 | End: 2021-07-02

## 2021-05-18 RX ORDER — POLYETHYLENE GLYCOL 3350, SODIUM CHLORIDE, SODIUM BICARBONATE, POTASSIUM CHLORIDE 420; 11.2; 5.72; 1.48 G/4L; G/4L; G/4L; G/4L
POWDER, FOR SOLUTION ORAL
Qty: 1 BOTTLE | Refills: 0 | Status: ON HOLD | OUTPATIENT
Start: 2021-05-18 | End: 2021-07-02

## 2021-05-18 NOTE — PATIENT INSTRUCTIONS
The patient presents today for continued care and evaluation regarding her diverticulitis, and final surgical decision making regarding her diverticulitis. The patient states that she has had no diverticulitis symptoms since her last visit.   She is not any problems or complications related to the surgical intervention. During this visit, the Enhanced Recovery after Intestinal Surgery (ERAS) Patient Guide was discussed with Steffanie.  She was provided a copy of the guide to review at home, which includes e

## 2021-05-18 NOTE — PROGRESS NOTES
Follow Up Visit Note       Active Problems      1. Acute diverticulitis    2. UTI symptoms    3. Essential hypertension, benign          Chief Complaint   Patient presents with:  Pt denies abd. pain or divertic flare-ups.  Pt states she recently had symptom professionals, documenting clinical information, independently interpreting results, coordinating care, and communication of any results that were available at today's visit. Allergies  Steffanie has No Known Allergies.     Past Medical / Surgical / Social been reviewed by me today.     Family History   Problem Relation Age of Onset   • Breast Cancer Paternal Grandmother 54   • Hypertension Mother    • Other (eczema) Mother    • Cancer Father         melanoma   • Lipids Father    • Other (hyperlipidemia) Fath Gastrointestinal: Negative for abdominal distention, abdominal pain, anal bleeding, blood in stool, constipation, diarrhea, nausea and vomiting. Genitourinary: Negative for difficulty urinating, dysuria, frequency and urgency.    Musculoskeletal: Jamar Jordan normal activity normal pitch. There  is no rebounding tenderness or guarding. There are no signs of ascites or peritonitis. The liver and spleen are nonpalpable. There are no palpable masses or hernias. Body mass index is 27.46 kg/m².     Musculoskelet with a possible segmental transverse colon resection, possible open at [facility] in [facility] on June 30, 2021. We again discussed with the patient that this procedure does carry a 3 to 5% risk of an anastomotic leak.   If this were to o

## 2021-05-22 DIAGNOSIS — I10 ESSENTIAL HYPERTENSION, BENIGN: ICD-10-CM

## 2021-05-24 RX ORDER — HYDROCHLOROTHIAZIDE 25 MG/1
25 TABLET ORAL DAILY
Qty: 90 TABLET | Refills: 0 | Status: SHIPPED | OUTPATIENT
Start: 2021-05-24 | End: 2021-08-13

## 2021-06-16 RX ORDER — CALCIUM POLYCARBOPHIL 625 MG
1 TABLET ORAL
COMMUNITY

## 2021-06-17 ENCOUNTER — TELEPHONE (OUTPATIENT)
Dept: SURGERY | Facility: CLINIC | Age: 51
End: 2021-06-17

## 2021-06-18 ENCOUNTER — LAB ENCOUNTER (OUTPATIENT)
Dept: LAB | Age: 51
End: 2021-06-18
Attending: PHYSICIAN ASSISTANT
Payer: COMMERCIAL

## 2021-06-18 ENCOUNTER — OFFICE VISIT (OUTPATIENT)
Dept: INTERNAL MEDICINE CLINIC | Facility: CLINIC | Age: 51
End: 2021-06-18
Payer: COMMERCIAL

## 2021-06-18 VITALS
DIASTOLIC BLOOD PRESSURE: 70 MMHG | TEMPERATURE: 97 F | SYSTOLIC BLOOD PRESSURE: 110 MMHG | WEIGHT: 170 LBS | BODY MASS INDEX: 28.32 KG/M2 | HEIGHT: 65 IN | RESPIRATION RATE: 16 BRPM | HEART RATE: 72 BPM

## 2021-06-18 DIAGNOSIS — Z12.31 ENCOUNTER FOR SCREENING MAMMOGRAM FOR MALIGNANT NEOPLASM OF BREAST: ICD-10-CM

## 2021-06-18 DIAGNOSIS — Z01.818 PRE-OP TESTING: ICD-10-CM

## 2021-06-18 DIAGNOSIS — F51.04 CHRONIC INSOMNIA: ICD-10-CM

## 2021-06-18 DIAGNOSIS — K57.92 ACUTE DIVERTICULITIS: ICD-10-CM

## 2021-06-18 DIAGNOSIS — Z01.818 PRE-OP TESTING: Primary | ICD-10-CM

## 2021-06-18 DIAGNOSIS — I10 ESSENTIAL HYPERTENSION, BENIGN: ICD-10-CM

## 2021-06-18 PROBLEM — R39.9 UTI SYMPTOMS: Status: RESOLVED | Noted: 2020-10-19 | Resolved: 2021-06-18

## 2021-06-18 PROCEDURE — 3008F BODY MASS INDEX DOCD: CPT | Performed by: PHYSICIAN ASSISTANT

## 2021-06-18 PROCEDURE — 3074F SYST BP LT 130 MM HG: CPT | Performed by: PHYSICIAN ASSISTANT

## 2021-06-18 PROCEDURE — 3078F DIAST BP <80 MM HG: CPT | Performed by: PHYSICIAN ASSISTANT

## 2021-06-18 PROCEDURE — 36415 COLL VENOUS BLD VENIPUNCTURE: CPT

## 2021-06-18 PROCEDURE — 93000 ELECTROCARDIOGRAM COMPLETE: CPT | Performed by: PHYSICIAN ASSISTANT

## 2021-06-18 PROCEDURE — 85025 COMPLETE CBC W/AUTO DIFF WBC: CPT

## 2021-06-18 PROCEDURE — 99214 OFFICE O/P EST MOD 30 MIN: CPT | Performed by: PHYSICIAN ASSISTANT

## 2021-06-18 RX ORDER — TRAZODONE HYDROCHLORIDE 50 MG/1
50 TABLET ORAL NIGHTLY PRN
Qty: 30 TABLET | Refills: 0 | Status: SHIPPED | OUTPATIENT
Start: 2021-06-18 | End: 2021-07-18

## 2021-06-18 NOTE — H&P
Mallorie Jama is a 44year old year old female who presents for a pre-operative physical exam.  Patient is to have robotic low anterior colon resection of rectosigmoid colon, possible transverse colon segmental resection, possible open, to be done by quadrant, Abnormal uterine bleeding, Acute pharyngitis, Allergic rhinitis, cause unspecified, Amenorrhea, Anxiety state, unspecified, Asthma, mild intermittent, Diverticulosis, Esophageal reflux, JARED (generalized anxiety disorder), GERD (gastroesophageal r of sleep apnea or COPD  CARDIOVASCULAR: denies chest pain on exertion  GI: denies abdominal pain, denies heartburn  : denies dysuria, hematuria or flank pain  MUSCULOSKELETAL: denies back pain  NEURO: denies headaches, dizziness, fainting  PSYCH: denies fibroid     Anxiety     History of diverticulitis     Need for influenza vaccination     Acute diverticulitis     Recurrent urinary tract infection     History of hysterectomy     Pt has no significant history of cardiac or pulmonary conditions.   EKG perfo

## 2021-06-22 ENCOUNTER — TELEPHONE (OUTPATIENT)
Dept: INTERNAL MEDICINE CLINIC | Facility: CLINIC | Age: 51
End: 2021-06-22

## 2021-06-22 ENCOUNTER — HOSPITAL ENCOUNTER (OUTPATIENT)
Dept: MAMMOGRAPHY | Age: 51
Discharge: HOME | End: 2021-06-22
Attending: PHYSICIAN ASSISTANT
Payer: COMMERCIAL

## 2021-06-22 DIAGNOSIS — Z12.31 ENCOUNTER FOR SCREENING MAMMOGRAM FOR MALIGNANT NEOPLASM OF BREAST: ICD-10-CM

## 2021-06-22 DIAGNOSIS — R92.2 INCONCLUSIVE MAMMOGRAM: Primary | ICD-10-CM

## 2021-06-22 PROCEDURE — 77067 SCR MAMMO BI INCL CAD: CPT | Performed by: PHYSICIAN ASSISTANT

## 2021-06-22 PROCEDURE — 77063 BREAST TOMOSYNTHESIS BI: CPT | Performed by: PHYSICIAN ASSISTANT

## 2021-06-22 NOTE — TELEPHONE ENCOUNTER
Results d/w pt. She has a limited time frame of when she can have imaging completed. Today, tomorrow after 1130am or Tuesday 29th before 1030 or after 130pm.  LMOVM for mammograpy dept to call our office back or contact pt directly.      Pt is scheduled for

## 2021-06-22 NOTE — TELEPHONE ENCOUNTER
----- Message from Tien Tinajero PA-C sent at 6/22/2021 11:12 AM CDT -----  Additional views of the left breast are recommended due to asymmetric appearance of the tissue

## 2021-06-23 ENCOUNTER — HOSPITAL ENCOUNTER (OUTPATIENT)
Dept: MAMMOGRAPHY | Facility: HOSPITAL | Age: 51
Discharge: HOME | End: 2021-06-23
Attending: PHYSICIAN ASSISTANT
Payer: COMMERCIAL

## 2021-06-23 ENCOUNTER — TELEPHONE (OUTPATIENT)
Dept: INTERNAL MEDICINE CLINIC | Facility: CLINIC | Age: 51
End: 2021-06-23

## 2021-06-23 DIAGNOSIS — R92.2 INCONCLUSIVE MAMMOGRAM: ICD-10-CM

## 2021-06-23 DIAGNOSIS — R92.8 ABNORMAL MAMMOGRAM: Primary | ICD-10-CM

## 2021-06-23 DIAGNOSIS — R92.8 ABNORMAL ULTRASOUND OF BREAST: ICD-10-CM

## 2021-06-23 PROCEDURE — 77061 BREAST TOMOSYNTHESIS UNI: CPT | Performed by: PHYSICIAN ASSISTANT

## 2021-06-23 PROCEDURE — 76642 ULTRASOUND BREAST LIMITED: CPT | Performed by: PHYSICIAN ASSISTANT

## 2021-06-23 PROCEDURE — 77065 DX MAMMO INCL CAD UNI: CPT | Performed by: PHYSICIAN ASSISTANT

## 2021-06-23 NOTE — TELEPHONE ENCOUNTER
----- Message from Nathan Ray PA-C sent at 6/23/2021  4:16 PM CDT -----  New cyst of L breast that corresponds to the asymmetry on mammogram. This is new.  Probably benign, but recheck L breast diagnostic mammo in 6 months to ensure stability

## 2021-06-25 NOTE — TELEPHONE ENCOUNTER
Pt viewed results via [employer]. [employer] message sent with information that order placed for repeat mammogram in 6 months.

## 2021-06-30 ENCOUNTER — HOSPITAL ENCOUNTER (INPATIENT)
Facility: HOSPITAL | Age: 51
LOS: 3 days | Discharge: HOME | DRG: 331 | End: 2021-07-03
Attending: COLON & RECTAL SURGERY | Admitting: COLON & RECTAL SURGERY
Payer: COMMERCIAL

## 2021-06-30 ENCOUNTER — ANESTHESIA (OUTPATIENT)
Dept: SURGERY | Facility: HOSPITAL | Age: 51
DRG: 331 | End: 2021-06-30
Payer: COMMERCIAL

## 2021-06-30 ENCOUNTER — ANESTHESIA EVENT (OUTPATIENT)
Dept: SURGERY | Facility: HOSPITAL | Age: 51
DRG: 331 | End: 2021-06-30
Payer: COMMERCIAL

## 2021-06-30 DIAGNOSIS — K57.92 ACUTE DIVERTICULITIS: Primary | ICD-10-CM

## 2021-06-30 PROCEDURE — S0030 INJECTION, METRONIDAZOLE: HCPCS | Performed by: COLON & RECTAL SURGERY

## 2021-06-30 PROCEDURE — 88307 TISSUE EXAM BY PATHOLOGIST: CPT | Performed by: COLON & RECTAL SURGERY

## 2021-06-30 PROCEDURE — S0028 INJECTION, FAMOTIDINE, 20 MG: HCPCS | Performed by: PHYSICIAN ASSISTANT

## 2021-06-30 PROCEDURE — 0DBN4ZZ EXCISION OF SIGMOID COLON, PERCUTANEOUS ENDOSCOPIC APPROACH: ICD-10-PCS | Performed by: COLON & RECTAL SURGERY

## 2021-06-30 PROCEDURE — 0DBP4ZZ EXCISION OF RECTUM, PERCUTANEOUS ENDOSCOPIC APPROACH: ICD-10-PCS | Performed by: COLON & RECTAL SURGERY

## 2021-06-30 PROCEDURE — 8E0W4CZ ROBOTIC ASSISTED PROCEDURE OF TRUNK REGION, PERCUTANEOUS ENDOSCOPIC APPROACH: ICD-10-PCS | Performed by: COLON & RECTAL SURGERY

## 2021-06-30 RX ORDER — PHENYLEPHRINE HCL 10 MG/ML
VIAL (ML) INJECTION AS NEEDED
Status: DISCONTINUED | OUTPATIENT
Start: 2021-06-30 | End: 2021-06-30 | Stop reason: SURG

## 2021-06-30 RX ORDER — HEPARIN SODIUM 5000 [USP'U]/ML
5000 INJECTION, SOLUTION INTRAVENOUS; SUBCUTANEOUS EVERY 8 HOURS SCHEDULED
Status: DISCONTINUED | OUTPATIENT
Start: 2021-07-01 | End: 2021-07-03

## 2021-06-30 RX ORDER — MEPERIDINE HYDROCHLORIDE 25 MG/ML
12.5 INJECTION INTRAMUSCULAR; INTRAVENOUS; SUBCUTANEOUS AS NEEDED
Status: DISCONTINUED | OUTPATIENT
Start: 2021-06-30 | End: 2021-06-30 | Stop reason: HOSPADM

## 2021-06-30 RX ORDER — FLUTICASONE PROPIONATE 50 MCG
1 SPRAY, SUSPENSION (ML) NASAL DAILY
Status: DISCONTINUED | OUTPATIENT
Start: 2021-06-30 | End: 2021-07-03

## 2021-06-30 RX ORDER — METOCLOPRAMIDE HYDROCHLORIDE 5 MG/ML
INJECTION INTRAMUSCULAR; INTRAVENOUS AS NEEDED
Status: DISCONTINUED | OUTPATIENT
Start: 2021-06-30 | End: 2021-06-30 | Stop reason: SURG

## 2021-06-30 RX ORDER — SODIUM CHLORIDE, SODIUM LACTATE, POTASSIUM CHLORIDE, CALCIUM CHLORIDE 600; 310; 30; 20 MG/100ML; MG/100ML; MG/100ML; MG/100ML
INJECTION, SOLUTION INTRAVENOUS CONTINUOUS PRN
Status: DISCONTINUED | OUTPATIENT
Start: 2021-06-30 | End: 2021-06-30 | Stop reason: SURG

## 2021-06-30 RX ORDER — HYDROCODONE BITARTRATE AND ACETAMINOPHEN 5; 325 MG/1; MG/1
2 TABLET ORAL AS NEEDED
Status: DISCONTINUED | OUTPATIENT
Start: 2021-06-30 | End: 2021-06-30 | Stop reason: HOSPADM

## 2021-06-30 RX ORDER — GARLIC EXTRACT 500 MG
1 CAPSULE ORAL DAILY
Status: DISCONTINUED | OUTPATIENT
Start: 2021-06-30 | End: 2021-07-03

## 2021-06-30 RX ORDER — HEPARIN SODIUM 5000 [USP'U]/ML
5000 INJECTION, SOLUTION INTRAVENOUS; SUBCUTANEOUS ONCE
Status: COMPLETED | OUTPATIENT
Start: 2021-06-30 | End: 2021-06-30

## 2021-06-30 RX ORDER — HYDROMORPHONE HYDROCHLORIDE 1 MG/ML
0.4 INJECTION, SOLUTION INTRAMUSCULAR; INTRAVENOUS; SUBCUTANEOUS EVERY 2 HOUR PRN
Status: DISCONTINUED | OUTPATIENT
Start: 2021-06-30 | End: 2021-07-03

## 2021-06-30 RX ORDER — HYDROCODONE BITARTRATE AND ACETAMINOPHEN 5; 325 MG/1; MG/1
1 TABLET ORAL EVERY 4 HOURS PRN
Status: DISCONTINUED | OUTPATIENT
Start: 2021-06-30 | End: 2021-07-03

## 2021-06-30 RX ORDER — SCOLOPAMINE TRANSDERMAL SYSTEM 1 MG/1
PATCH, EXTENDED RELEASE TRANSDERMAL
Status: COMPLETED
Start: 2021-06-30 | End: 2021-07-03

## 2021-06-30 RX ORDER — SODIUM CHLORIDE, SODIUM LACTATE, POTASSIUM CHLORIDE, CALCIUM CHLORIDE 600; 310; 30; 20 MG/100ML; MG/100ML; MG/100ML; MG/100ML
INJECTION, SOLUTION INTRAVENOUS CONTINUOUS
Status: DISCONTINUED | OUTPATIENT
Start: 2021-06-30 | End: 2021-07-03

## 2021-06-30 RX ORDER — HYDROMORPHONE HYDROCHLORIDE 1 MG/ML
0.8 INJECTION, SOLUTION INTRAMUSCULAR; INTRAVENOUS; SUBCUTANEOUS EVERY 2 HOUR PRN
Status: DISCONTINUED | OUTPATIENT
Start: 2021-06-30 | End: 2021-07-03

## 2021-06-30 RX ORDER — HYDROCHLOROTHIAZIDE 25 MG/1
25 TABLET ORAL DAILY
Status: DISCONTINUED | OUTPATIENT
Start: 2021-06-30 | End: 2021-07-03

## 2021-06-30 RX ORDER — SODIUM CHLORIDE, SODIUM LACTATE, POTASSIUM CHLORIDE, CALCIUM CHLORIDE 600; 310; 30; 20 MG/100ML; MG/100ML; MG/100ML; MG/100ML
INJECTION, SOLUTION INTRAVENOUS CONTINUOUS
Status: DISCONTINUED | OUTPATIENT
Start: 2021-06-30 | End: 2021-06-30 | Stop reason: HOSPADM

## 2021-06-30 RX ORDER — TRAZODONE HYDROCHLORIDE 50 MG/1
50 TABLET ORAL NIGHTLY PRN
Status: DISCONTINUED | OUTPATIENT
Start: 2021-06-30 | End: 2021-07-03

## 2021-06-30 RX ORDER — CETIRIZINE HYDROCHLORIDE 10 MG/1
10 TABLET ORAL DAILY
Status: DISCONTINUED | OUTPATIENT
Start: 2021-06-30 | End: 2021-07-03

## 2021-06-30 RX ORDER — LIDOCAINE HYDROCHLORIDE 10 MG/ML
INJECTION, SOLUTION EPIDURAL; INFILTRATION; INTRACAUDAL; PERINEURAL AS NEEDED
Status: DISCONTINUED | OUTPATIENT
Start: 2021-06-30 | End: 2021-06-30 | Stop reason: SURG

## 2021-06-30 RX ORDER — NEOSTIGMINE METHYLSULFATE 1 MG/ML
INJECTION INTRAVENOUS AS NEEDED
Status: DISCONTINUED | OUTPATIENT
Start: 2021-06-30 | End: 2021-06-30 | Stop reason: SURG

## 2021-06-30 RX ORDER — ONDANSETRON 2 MG/ML
INJECTION INTRAMUSCULAR; INTRAVENOUS AS NEEDED
Status: DISCONTINUED | OUTPATIENT
Start: 2021-06-30 | End: 2021-06-30 | Stop reason: SURG

## 2021-06-30 RX ORDER — HYDROCODONE BITARTRATE AND ACETAMINOPHEN 5; 325 MG/1; MG/1
1 TABLET ORAL AS NEEDED
Status: DISCONTINUED | OUTPATIENT
Start: 2021-06-30 | End: 2021-06-30 | Stop reason: HOSPADM

## 2021-06-30 RX ORDER — SCOLOPAMINE TRANSDERMAL SYSTEM 1 MG/1
1 PATCH, EXTENDED RELEASE TRANSDERMAL ONCE
Status: COMPLETED | OUTPATIENT
Start: 2021-06-30 | End: 2021-07-03

## 2021-06-30 RX ORDER — LIDOCAINE HYDROCHLORIDE ANHYDROUS AND DEXTROSE MONOHYDRATE .8; 5 G/100ML; G/100ML
INJECTION, SOLUTION INTRAVENOUS CONTINUOUS PRN
Status: DISCONTINUED | OUTPATIENT
Start: 2021-06-30 | End: 2021-06-30 | Stop reason: SURG

## 2021-06-30 RX ORDER — KETOROLAC TROMETHAMINE 15 MG/ML
15 INJECTION, SOLUTION INTRAMUSCULAR; INTRAVENOUS EVERY 6 HOURS PRN
Status: DISPENSED | OUTPATIENT
Start: 2021-06-30 | End: 2021-07-02

## 2021-06-30 RX ORDER — ONDANSETRON 2 MG/ML
4 INJECTION INTRAMUSCULAR; INTRAVENOUS EVERY 4 HOURS PRN
Status: DISCONTINUED | OUTPATIENT
Start: 2021-06-30 | End: 2021-07-03

## 2021-06-30 RX ORDER — GLYCOPYRROLATE 0.2 MG/ML
INJECTION, SOLUTION INTRAMUSCULAR; INTRAVENOUS AS NEEDED
Status: DISCONTINUED | OUTPATIENT
Start: 2021-06-30 | End: 2021-06-30 | Stop reason: SURG

## 2021-06-30 RX ORDER — ROCURONIUM BROMIDE 10 MG/ML
INJECTION, SOLUTION INTRAVENOUS AS NEEDED
Status: DISCONTINUED | OUTPATIENT
Start: 2021-06-30 | End: 2021-06-30 | Stop reason: SURG

## 2021-06-30 RX ORDER — ACETAMINOPHEN 500 MG
1000 TABLET ORAL EVERY 6 HOURS PRN
Status: DISCONTINUED | OUTPATIENT
Start: 2021-06-30 | End: 2021-07-03

## 2021-06-30 RX ORDER — ACETAMINOPHEN 500 MG
500 TABLET ORAL EVERY 6 HOURS PRN
Status: DISCONTINUED | OUTPATIENT
Start: 2021-06-30 | End: 2021-07-03

## 2021-06-30 RX ORDER — HYDROMORPHONE HYDROCHLORIDE 1 MG/ML
0.4 INJECTION, SOLUTION INTRAMUSCULAR; INTRAVENOUS; SUBCUTANEOUS EVERY 5 MIN PRN
Status: DISCONTINUED | OUTPATIENT
Start: 2021-06-30 | End: 2021-06-30 | Stop reason: HOSPADM

## 2021-06-30 RX ORDER — DIPHENHYDRAMINE HYDROCHLORIDE 50 MG/ML
12.5 INJECTION INTRAMUSCULAR; INTRAVENOUS AS NEEDED
Status: DISCONTINUED | OUTPATIENT
Start: 2021-06-30 | End: 2021-06-30 | Stop reason: HOSPADM

## 2021-06-30 RX ORDER — HYDROCODONE BITARTRATE AND ACETAMINOPHEN 5; 325 MG/1; MG/1
2 TABLET ORAL EVERY 4 HOURS PRN
Status: DISCONTINUED | OUTPATIENT
Start: 2021-06-30 | End: 2021-07-03

## 2021-06-30 RX ORDER — HYDROMORPHONE HYDROCHLORIDE 1 MG/ML
INJECTION, SOLUTION INTRAMUSCULAR; INTRAVENOUS; SUBCUTANEOUS
Status: COMPLETED
Start: 2021-06-30 | End: 2021-06-30

## 2021-06-30 RX ORDER — LABETALOL HYDROCHLORIDE 5 MG/ML
5 INJECTION, SOLUTION INTRAVENOUS EVERY 5 MIN PRN
Status: DISCONTINUED | OUTPATIENT
Start: 2021-06-30 | End: 2021-06-30 | Stop reason: HOSPADM

## 2021-06-30 RX ORDER — FAMOTIDINE 20 MG/1
20 TABLET ORAL 2 TIMES DAILY
Status: DISCONTINUED | OUTPATIENT
Start: 2021-06-30 | End: 2021-07-03

## 2021-06-30 RX ORDER — SODIUM CHLORIDE 9 MG/ML
INJECTION, SOLUTION INTRAVENOUS CONTINUOUS
Status: DISCONTINUED | OUTPATIENT
Start: 2021-06-30 | End: 2021-07-01

## 2021-06-30 RX ORDER — ONDANSETRON 2 MG/ML
4 INJECTION INTRAMUSCULAR; INTRAVENOUS AS NEEDED
Status: DISCONTINUED | OUTPATIENT
Start: 2021-06-30 | End: 2021-06-30 | Stop reason: HOSPADM

## 2021-06-30 RX ORDER — MIDAZOLAM HYDROCHLORIDE 1 MG/ML
1 INJECTION INTRAMUSCULAR; INTRAVENOUS EVERY 5 MIN PRN
Status: DISCONTINUED | OUTPATIENT
Start: 2021-06-30 | End: 2021-06-30 | Stop reason: HOSPADM

## 2021-06-30 RX ORDER — KETOROLAC TROMETHAMINE 30 MG/ML
INJECTION, SOLUTION INTRAMUSCULAR; INTRAVENOUS AS NEEDED
Status: DISCONTINUED | OUTPATIENT
Start: 2021-06-30 | End: 2021-06-30 | Stop reason: SURG

## 2021-06-30 RX ORDER — NALOXONE HYDROCHLORIDE 0.4 MG/ML
80 INJECTION, SOLUTION INTRAMUSCULAR; INTRAVENOUS; SUBCUTANEOUS AS NEEDED
Status: DISCONTINUED | OUTPATIENT
Start: 2021-06-30 | End: 2021-06-30 | Stop reason: HOSPADM

## 2021-06-30 RX ORDER — ALPRAZOLAM 0.25 MG/1
0.25 TABLET ORAL EVERY 6 HOURS PRN
Status: DISCONTINUED | OUTPATIENT
Start: 2021-06-30 | End: 2021-07-03

## 2021-06-30 RX ORDER — ACETAMINOPHEN 500 MG
1000 TABLET ORAL ONCE
Status: DISCONTINUED | OUTPATIENT
Start: 2021-06-30 | End: 2021-06-30 | Stop reason: HOSPADM

## 2021-06-30 RX ORDER — BUPIVACAINE HYDROCHLORIDE AND EPINEPHRINE 5; 5 MG/ML; UG/ML
INJECTION, SOLUTION EPIDURAL; INTRACAUDAL; PERINEURAL AS NEEDED
Status: DISCONTINUED | OUTPATIENT
Start: 2021-06-30 | End: 2021-06-30 | Stop reason: HOSPADM

## 2021-06-30 RX ORDER — METRONIDAZOLE 500 MG/100ML
500 INJECTION, SOLUTION INTRAVENOUS ONCE
Status: COMPLETED | OUTPATIENT
Start: 2021-06-30 | End: 2021-06-30

## 2021-06-30 RX ORDER — HYDROMORPHONE HYDROCHLORIDE 1 MG/ML
0.2 INJECTION, SOLUTION INTRAMUSCULAR; INTRAVENOUS; SUBCUTANEOUS EVERY 2 HOUR PRN
Status: DISCONTINUED | OUTPATIENT
Start: 2021-06-30 | End: 2021-07-03

## 2021-06-30 RX ORDER — DEXAMETHASONE SODIUM PHOSPHATE 4 MG/ML
VIAL (ML) INJECTION AS NEEDED
Status: DISCONTINUED | OUTPATIENT
Start: 2021-06-30 | End: 2021-06-30 | Stop reason: SURG

## 2021-06-30 RX ORDER — FAMOTIDINE 10 MG/ML
20 INJECTION, SOLUTION INTRAVENOUS 2 TIMES DAILY
Status: DISCONTINUED | OUTPATIENT
Start: 2021-06-30 | End: 2021-07-03

## 2021-06-30 RX ADMIN — GLYCOPYRROLATE 0.6 MG: 0.2 INJECTION, SOLUTION INTRAMUSCULAR; INTRAVENOUS at 11:14:00

## 2021-06-30 RX ADMIN — SODIUM CHLORIDE, SODIUM LACTATE, POTASSIUM CHLORIDE, CALCIUM CHLORIDE: 600; 310; 30; 20 INJECTION, SOLUTION INTRAVENOUS at 11:00:00

## 2021-06-30 RX ADMIN — ROCURONIUM BROMIDE 70 MG: 10 INJECTION, SOLUTION INTRAVENOUS at 08:17:00

## 2021-06-30 RX ADMIN — SODIUM CHLORIDE, SODIUM LACTATE, POTASSIUM CHLORIDE, CALCIUM CHLORIDE: 600; 310; 30; 20 INJECTION, SOLUTION INTRAVENOUS at 08:10:00

## 2021-06-30 RX ADMIN — ONDANSETRON 4 MG: 2 INJECTION INTRAMUSCULAR; INTRAVENOUS at 11:03:00

## 2021-06-30 RX ADMIN — METOCLOPRAMIDE HYDROCHLORIDE 10 MG: 5 INJECTION INTRAMUSCULAR; INTRAVENOUS at 11:28:00

## 2021-06-30 RX ADMIN — METRONIDAZOLE 500 MG: 500 INJECTION, SOLUTION INTRAVENOUS at 08:37:00

## 2021-06-30 RX ADMIN — LIDOCAINE HYDROCHLORIDE ANHYDROUS AND DEXTROSE MONOHYDRATE 2 MG/KG/HR: .8; 5 INJECTION, SOLUTION INTRAVENOUS at 08:49:00

## 2021-06-30 RX ADMIN — KETOROLAC TROMETHAMINE 30 MG: 30 INJECTION, SOLUTION INTRAMUSCULAR; INTRAVENOUS at 11:08:00

## 2021-06-30 RX ADMIN — NEOSTIGMINE METHYLSULFATE 4 MG: 1 INJECTION INTRAVENOUS at 11:14:00

## 2021-06-30 RX ADMIN — DEXAMETHASONE SODIUM PHOSPHATE 4 MG: 4 MG/ML VIAL (ML) INJECTION at 08:49:00

## 2021-06-30 RX ADMIN — PHENYLEPHRINE HCL 150 MCG: 10 MG/ML VIAL (ML) INJECTION at 08:53:00

## 2021-06-30 RX ADMIN — ROCURONIUM BROMIDE 30 MG: 10 INJECTION, SOLUTION INTRAVENOUS at 08:56:00

## 2021-06-30 RX ADMIN — LIDOCAINE HYDROCHLORIDE 100 MG: 10 INJECTION, SOLUTION EPIDURAL; INFILTRATION; INTRACAUDAL; PERINEURAL at 08:16:00

## 2021-06-30 RX ADMIN — DEXAMETHASONE SODIUM PHOSPHATE 4 MG: 4 MG/ML VIAL (ML) INJECTION at 11:29:00

## 2021-06-30 RX ADMIN — ROCURONIUM BROMIDE 10 MG: 10 INJECTION, SOLUTION INTRAVENOUS at 10:37:00

## 2021-06-30 NOTE — H&P
Active Problems      1. Acute diverticulitis    2. UTI symptoms    3. Essential hypertension, benign          Chief Complaint   Patient presents with:  Pt denies abd. pain or divertic flare-ups.  Pt states she recently had symptoms of a UTI that started Fri for cervical cancer screening 1-     wnl pt stated   • Unspecified constipation     • Unspecified essential hypertension     • Visual impairment       wears contact lenses and glasses            Past Surgical History:   Procedure Laterality Date   • Exercise: Yes          7 x a week. walk        Current Outpatient Medications:   •  ALPRAZolam 0.25 MG Oral Tab, Take 1 tablet (0.25 mg total) by mouth every 6 (six) hours as needed. , Disp: 30 tablet, Rfl: 0  •  hydrochlorothiazide 25 MG Oral Tab, Take 1 There  is no rebounding tenderness or guarding. There are no signs of ascites or peritonitis. The liver and spleen are nonpalpable. There are no palpable masses or hernias. Body mass index is 27.46 kg/m².     Musculoskeletal:         General: Normal ran

## 2021-06-30 NOTE — ANESTHESIA PREPROCEDURE EVALUATION
PRE-OP EVALUATION    Patient Name: Wu Mcleod    Admit Diagnosis: Acute diverticulitis [K57.92]    Pre-op Diagnosis: Acute diverticulitis [K57.92]    XI ROBOTIC LOW ANTERIOR COLON RESECTION OF RECTOSIGMOID COLON, POSSIBLE TRANSVERSE COLON SEGMENTAL Unknown time  ALPRAZolam 0.25 MG Oral Tab, Take 1 tablet (0.25 mg total) by mouth every 6 (six) hours as needed. , Disp: 30 tablet, Rfl: 0, Past Week at Unknown time  Fluticasone Propionate 50 MCG/ACT Nasal Suspension, 1 spray by Nasal route daily.   , Disp: repair fx of LUE     Social History    Tobacco Use      Smoking status: Never Smoker      Smokeless tobacco: Never Used    Alcohol use: Yes      Comment: twice per month      Drug use: No     Available pre-op labs reviewed.   Lab Results   Component Valu

## 2021-06-30 NOTE — OPERATIVE REPORT
[facility]  Operative Note    Armando Gaines Location: OR   [facility] [id number] MRN [id number]   Admission Date 6/30/2021 Operation Date 6/30/2021   Attending Physician Radha Polk MD Operating Physician Aiden Blood MD     Pre-Operative Diagnosis: D descending colon. No acute abnormalities were identified. There are no signs of phlegmon. There were signs of previous adhesions. These were easily lysed. There is no inherent pathology that requires resection of these regions at this time.     Patient rectosigmoid colon. Please see the operative findings for the results of the diagnostic laparoscopy. We then docked the robot, and began with the procedure.     We began with mobilization of the descending colon as well as any lateral abdominal sidewall of colon that was to be resected. This included the area of the active diverticulitis. Our plan resection is from the pathology within the left colon, all the way down to the mid and upper rectum.     Once we were assured that the rectum was easily mobili colon with a pursestring suture of 2-0 Prolene suture. This external operative site was then thoroughly irrigated. The anvil within the cut portion of the colon was now secured and then reduced below the peritoneum.     The Pfannenstiel incision was sedrick site in the right lower quadrant was closed at the fascial level with interrupted 0 Maxon. All skin ports sites were closed with 5-0 undyed Vicryl in a subcuticular fashion.   The extraction site was then closed with a deep layer of interrupted 3-0 Vicry

## 2021-06-30 NOTE — PLAN OF CARE
Problem: Patient/Family Goals  Goal: Patient/Family Long Term Goal  Description: Patient's Long Term Goal: discharge    Interventions:  - tolerate diet  - tolerate pain  - See additional Care Plan goals for specific interventions  Outcome: Progressing  G to reduce risk of injury  - Provide assistive devices as appropriate  - Consider OT/PT consult to assist with strengthening/mobility  - Encourage toileting schedule  Outcome: Progressing     Problem: DISCHARGE PLANNING  Goal: Discharge to home or other fac

## 2021-06-30 NOTE — PROGRESS NOTES
NURSING ADMISSION NOTE      Patient admitted via Cart  Oriented to room. Safety precautions initiated. Bed in low position. Call light in reach. 1425- Pt drowsy, oriented x4. Family at bedside. VS stable, afebrile.  Pt denies Sob, chest pain, n/v.

## 2021-06-30 NOTE — PROGRESS NOTES
06/30/21 1719   Clinical Encounter Type   Visited With Patient not available   Routine Visit Introduction   Continue Visiting Yes   Patient Spiritual Encounters   Spiritual Assessment Completed Yes

## 2021-06-30 NOTE — ANESTHESIA POSTPROCEDURE EVALUATION
[facility] Patient Status:  Inpatient   Age/Gender 44year old female MRN [id number]   Location [address] [facility] Attending Brandy Mcadams MD   Hosp Day # 0 PCP Amari Mcdermott MD       Anesthesia Post-op Not

## 2021-06-30 NOTE — ANESTHESIA PROCEDURE NOTES
Airway  Urgency: elective      General Information and Staff    Patient location during procedure: OR  Anesthesiologist: Vlad Dawn MD  Performed: anesthesiologist     Indications and Patient Condition  Indications for airway management: anesthesia  Se

## 2021-06-30 NOTE — PROGRESS NOTES
Pt reporting adequate relief with pain medication. Resting comfortably in bed. Pt given ensure clear for first meal. IS given to patient, pt demonstrated to 500. Will continue to monitor.

## 2021-07-01 PROCEDURE — S0028 INJECTION, FAMOTIDINE, 20 MG: HCPCS | Performed by: PHYSICIAN ASSISTANT

## 2021-07-01 NOTE — PROGRESS NOTES
[facility]  Progress Note    Gisselle Drake Patient Status:  Inpatient    1970 MRN [id number]   [facility] 3NW-A Attending Cathleen Arvizu MD   Hosp Day # 1 PCP Sneha Snider MD     Subjective:  No new complaints, incisional loida

## 2021-07-01 NOTE — PLAN OF CARE
Pt alert and oriented x4. Tolerating clear liquids, up with SB assist. Sat up in chair and ambulated in halls this morning. ERAS protocol. Damico removed this morning; pt is due to void.  Lap sites x5 with SS, old bloody drainage noted; lower transverse inci fall injury  Description: INTERVENTIONS:  - Assess pt frequently for physical needs  - Identify cognitive and physical deficits and behaviors that affect risk of falls.   - [place] fall precautions as indicated by assessment.  - Educate pt/family on patie

## 2021-07-01 NOTE — PROGRESS NOTES
Pt resting in bed, easy non labored breathing on ra. Vs wnl. Up with sb assist. cpox in place. Hob 30 degrees. Damico draining without difficulty. Lap sites x5 s.s., jpx1, transverse incision with dressing c/d/i. Iv fluids infusing without difficulty.  Pt to

## 2021-07-01 NOTE — PROGRESS NOTES
07/01/21 1421   Clinical Encounter Type   Visited With Patient   Routine Visit   (Responded to [facility] request\")   Patient Spiritual Encounters   Spiritual Interventions Provided supportive listening presence and prayer.  Promoted a sense of i

## 2021-07-01 NOTE — PROGRESS NOTES
Pt resting in bed. Receiving prn medication per mar for pain. Pt ambulating in hallway, up ad zhang after set up. SALLY putting out serosanguinous drainage. Tolerating regular diet. Voiding, monitoring urine output, IVF infusing per mar.  Will continue to [facility]

## 2021-07-02 RX ORDER — HYDROCODONE BITARTRATE AND ACETAMINOPHEN 5; 325 MG/1; MG/1
1 TABLET ORAL EVERY 6 HOURS PRN
Qty: 25 TABLET | Refills: 0 | Status: SHIPPED | OUTPATIENT
Start: 2021-07-02 | End: 2021-08-06 | Stop reason: ALTCHOICE

## 2021-07-02 RX ORDER — KETOROLAC TROMETHAMINE 15 MG/ML
15 INJECTION, SOLUTION INTRAMUSCULAR; INTRAVENOUS EVERY 6 HOURS PRN
Status: DISCONTINUED | OUTPATIENT
Start: 2021-07-02 | End: 2021-07-03

## 2021-07-02 NOTE — PLAN OF CARE
Patient complains of pain treated with norco.  No nausea. Reg/gen diet, tolerating. Voids to bathroom. +flatus, no bm. SALLY drain intact w/ SS drainage. VSS. Safety maintained.

## 2021-07-02 NOTE — PROGRESS NOTES
[facility]  Progress Note    Mag Shah Patient Status:  Inpatient    1970 MRN [id number]   [facility] 3NW-A Attending Terrell Sarmiento MD   Hosp Day # 2 PCP Po Park MD     Subjective:  No new complaints, incisional loida questions answered    Bibiana Michel PA-C  7/2/2021  9:08 AM

## 2021-07-03 VITALS
HEIGHT: 65 IN | TEMPERATURE: 98 F | WEIGHT: 165.56 LBS | RESPIRATION RATE: 18 BRPM | SYSTOLIC BLOOD PRESSURE: 115 MMHG | HEART RATE: 64 BPM | BODY MASS INDEX: 27.58 KG/M2 | DIASTOLIC BLOOD PRESSURE: 67 MMHG | OXYGEN SATURATION: 96 %

## 2021-07-03 NOTE — PLAN OF CARE
Problem: Patient/Family Goals  Goal: Patient/Family Long Term Goal  Description: Patient's Long Term Goal: discharge    Interventions:  - tolerate diet  - tolerate pain  - See additional Care Plan goals for specific interventions  Outcome: Progressing  G assessment  - Modify environment to reduce risk of injury  - Provide assistive devices as appropriate  - Consider OT/PT consult to assist with strengthening/mobility  - Encourage toileting schedule  Outcome: Progressing     Problem: [address] Garcia

## 2021-07-03 NOTE — PLAN OF CARE
NURSING DISCHARGE NOTE    Discharged Home via Wheelchair. Accompanied by Spouse and Support staff  Belongings Taken by patient/family. Discharge instructions removed. Demonstrated how to empty SALLY and document sheet provided.  Pt instructed on when t

## 2021-07-03 NOTE — PROGRESS NOTES
Pt is Ax4, vss, afebrile, ERAS, tolerating general diet, denies any n/v at this time, voids freely, up w/sb.  Lap sites x 5 w/ steri-strips and low transverse incision w/ gauze & tape, some old drainage on steri-strips otherwise c/d/i, no signs of infection

## 2021-07-03 NOTE — PLAN OF CARE
Assumed care of pt @ 0730. Pt is A/Ox 4. On RA and VSS. IV saline locked. Tolerating diet. Pt states pain is tolerable and with good control when norco given. Up as tolerated  Intake/outputs WNL. Plan dc later today.     Updated POC with patient injury  Description: INTERVENTIONS:  - Assess pt frequently for physical needs  - Identify cognitive and physical deficits and behaviors that affect risk of falls.   - [place] fall precautions as indicated by assessment.  - Educate pt/family on patient sa

## 2021-07-05 NOTE — DISCHARGE SUMMARY
[facility]  Discharge Summary    [facility]:  [facility]    1970 MRN [id number]   [facility] 3NW-A Attending No att. providers found   Hosp Day # 3 PCP Shalini Knight MD     Date of Admission: 2021    Date erythema.     Consultations: none    Complications: none     Disposition: Home to self care     Discharge Condition: Good    Discharge Medications: Discharge Medication List as of 7/3/2021 11:24 AM    START taking these medications    HYDROcodone-acetaminop

## 2021-07-06 ENCOUNTER — OFFICE VISIT (OUTPATIENT)
Dept: SURGERY | Facility: CLINIC | Age: 51
End: 2021-07-06

## 2021-07-06 VITALS — DIASTOLIC BLOOD PRESSURE: 81 MMHG | SYSTOLIC BLOOD PRESSURE: 114 MMHG | HEART RATE: 81 BPM

## 2021-07-06 DIAGNOSIS — K57.92 ACUTE DIVERTICULITIS: Primary | ICD-10-CM

## 2021-07-06 PROCEDURE — 3079F DIAST BP 80-89 MM HG: CPT | Performed by: COLON & RECTAL SURGERY

## 2021-07-06 PROCEDURE — 99024 POSTOP FOLLOW-UP VISIT: CPT | Performed by: COLON & RECTAL SURGERY

## 2021-07-06 PROCEDURE — 3074F SYST BP LT 130 MM HG: CPT | Performed by: COLON & RECTAL SURGERY

## 2021-07-06 NOTE — PATIENT INSTRUCTIONS
This patient underwent a robotic low anterior resection the rectosigmoid colon on June 30, 2021. The patient has a drain in place. She presents for postoperative care and evaluation of the drain. This patient is doing extremely well.     The patient ha

## 2021-07-06 NOTE — PROGRESS NOTES
Post Operative Visit Note       Active Problems  1.  Acute diverticulitis         Chief Complaint   Patient presents with:  Post-Op: XI ROBOTIC LOW ANTERIOR RESECTION OF RECTOSIGMOID COLON, POSSIBLE TRANSVERSE COLON SEGMENTAL RESECTION, POSSIBLE OPEN ON 6/3 CHOLECYSTECTOMY  1998   • COLECTOMY     • COLONOSCOPY     • COLONOSCOPY  09/2017    hp polyp, diverticulosis.  repeat 10 year   • COLONOSCOPY N/A 9/27/2017    Procedure: COLONOSCOPY, POSSIBLE BIOPSY, POSSIBLE POLYPECTOMY 41932;  Surgeon: Kathy Cherry, needed. , Disp: , Rfl:   •  hydrochlorothiazide 25 MG Oral Tab, Take 1 tablet (25 mg total) by mouth daily. , Disp: 90 tablet, Rfl: 0  •  ALPRAZolam 0.25 MG Oral Tab, Take 1 tablet (0.25 mg total) by mouth every 6 (six) hours as needed. , Disp: 30 tablet, Rfl opportunity today to remove her right mid abdominal drain. It was less than 30 cc in the last 24 hours. Drainage is a cranberry color.                  Assessment   Acute diverticulitis  (primary encounter diagnosis)      Plan   This patient underwent a r

## 2021-07-09 NOTE — PAYOR COMM NOTE
--------------  DISCHARGE REVIEW    Payor: [address] [place] [address] Drive #:  [id number]  Authorization Number: [id number]    Admit date: 6/30/21  Admit time:   6:16 AM  Discharge Date: 7/3/2021 12:28 PM     Admitting Physician: Tone Oconnell MD  Attending constipation, but takes fiber for it.     The patient did go and see Dr. Bacilio Rosado who performed a cystoscopy, there is no evidence of any colovesical fistula on the cystoscopy.  The patient states that she did have some UTI symptoms that started over the weeke Sulfate 500 MG Oral Tab    metRONIDAZOLE (FLAGYL) 500 MG Oral Tab          Follow up Visits: Follow-up with Dr. Aguila Arcos in approximately the next 7 days.      Other Discharge Instructions:    General diet  No lifting, no driving, the patient may shower  [facility]

## 2021-07-17 DIAGNOSIS — F51.04 CHRONIC INSOMNIA: ICD-10-CM

## 2021-07-19 ENCOUNTER — OFFICE VISIT (OUTPATIENT)
Dept: SURGERY | Facility: CLINIC | Age: 51
End: 2021-07-19

## 2021-07-19 VITALS
SYSTOLIC BLOOD PRESSURE: 112 MMHG | BODY MASS INDEX: 27.49 KG/M2 | HEART RATE: 70 BPM | HEIGHT: 65 IN | DIASTOLIC BLOOD PRESSURE: 78 MMHG | WEIGHT: 165 LBS | TEMPERATURE: 97 F

## 2021-07-19 DIAGNOSIS — K57.92 ACUTE DIVERTICULITIS: Primary | ICD-10-CM

## 2021-07-19 PROCEDURE — 3078F DIAST BP <80 MM HG: CPT | Performed by: COLON & RECTAL SURGERY

## 2021-07-19 PROCEDURE — 3008F BODY MASS INDEX DOCD: CPT | Performed by: COLON & RECTAL SURGERY

## 2021-07-19 PROCEDURE — 99024 POSTOP FOLLOW-UP VISIT: CPT | Performed by: COLON & RECTAL SURGERY

## 2021-07-19 PROCEDURE — 3074F SYST BP LT 130 MM HG: CPT | Performed by: COLON & RECTAL SURGERY

## 2021-07-19 RX ORDER — TRAZODONE HYDROCHLORIDE 50 MG/1
TABLET ORAL
Qty: 30 TABLET | Refills: 1 | Status: SHIPPED | OUTPATIENT
Start: 2021-07-19 | End: 2021-09-29

## 2021-07-19 NOTE — PATIENT INSTRUCTIONS
The patient presents today for continued care and evaluation after undergoing a robotic low anterior resection on June 30, 2021. He states that she is doing very well at this time. She states that she is having very minimal pain.   She only gets pain at

## 2021-07-19 NOTE — PROGRESS NOTES
Follow Up Visit Note       Active Problems      1. Acute diverticulitis          Chief Complaint   Patient presents with:  Colon Problem: EST - 2 week continued care colon resection. Pt states pain has improved.  Pt states pain is about 2/10 when up and mov unspecified    • Asthma, mild intermittent    • Diverticulosis    • Esophageal reflux    • JARED (generalized anxiety disorder)    • GERD (gastroesophageal reflux disease)    • H/O mammogram 1-7-2014    benign   • Headache(784.0)    • High blood pressure file    Tobacco Use      Smoking status: Never Smoker      Smokeless tobacco: Never Used    Vaping Use      Vaping Use: Never used    Substance and Sexual Activity      Alcohol use: Yes        Comment: twice per month      Drug use: No      Sexual activity Musculoskeletal: Negative for arthralgias and myalgias. Skin: Negative for color change and rash. Neurological: Negative for tremors, syncope and weakness. Hematological: Negative for adenopathy. Does not bruise/bleed easily.    Psychiatric/Behavior no signs of ascites or peritonitis. The liver and spleen are nonpalpable. There are no palpable masses or hernias. Body mass index is 27.46 kg/m². The patient is recovering very well following her robotic low anterior resection.     I discussed with

## 2021-08-06 ENCOUNTER — OFFICE VISIT (OUTPATIENT)
Dept: INTERNAL MEDICINE CLINIC | Facility: CLINIC | Age: 51
End: 2021-08-06
Payer: COMMERCIAL

## 2021-08-06 VITALS
WEIGHT: 165 LBS | BODY MASS INDEX: 27.49 KG/M2 | SYSTOLIC BLOOD PRESSURE: 110 MMHG | HEIGHT: 65 IN | HEART RATE: 70 BPM | OXYGEN SATURATION: 97 % | RESPIRATION RATE: 16 BRPM | DIASTOLIC BLOOD PRESSURE: 76 MMHG

## 2021-08-06 DIAGNOSIS — R09.82 POSTNASAL DRIP: ICD-10-CM

## 2021-08-06 DIAGNOSIS — E55.9 VITAMIN D DEFICIENCY: ICD-10-CM

## 2021-08-06 DIAGNOSIS — F51.04 PSYCHOPHYSIOLOGICAL INSOMNIA: ICD-10-CM

## 2021-08-06 DIAGNOSIS — Z00.00 ROUTINE PHYSICAL EXAMINATION: Primary | ICD-10-CM

## 2021-08-06 DIAGNOSIS — I10 ESSENTIAL HYPERTENSION, BENIGN: ICD-10-CM

## 2021-08-06 PROBLEM — Z23 NEED FOR INFLUENZA VACCINATION: Status: RESOLVED | Noted: 2020-10-19 | Resolved: 2021-08-06

## 2021-08-06 PROBLEM — Z87.19 HISTORY OF DIVERTICULITIS: Status: RESOLVED | Noted: 2017-07-21 | Resolved: 2021-08-06

## 2021-08-06 PROBLEM — K57.92 ACUTE DIVERTICULITIS: Status: RESOLVED | Noted: 2021-05-04 | Resolved: 2021-08-06

## 2021-08-06 PROCEDURE — 3008F BODY MASS INDEX DOCD: CPT | Performed by: PHYSICIAN ASSISTANT

## 2021-08-06 PROCEDURE — 3074F SYST BP LT 130 MM HG: CPT | Performed by: PHYSICIAN ASSISTANT

## 2021-08-06 PROCEDURE — 3078F DIAST BP <80 MM HG: CPT | Performed by: PHYSICIAN ASSISTANT

## 2021-08-06 PROCEDURE — 90715 TDAP VACCINE 7 YRS/> IM: CPT | Performed by: PHYSICIAN ASSISTANT

## 2021-08-06 PROCEDURE — 90471 IMMUNIZATION ADMIN: CPT | Performed by: PHYSICIAN ASSISTANT

## 2021-08-06 PROCEDURE — 99396 PREV VISIT EST AGE 40-64: CPT | Performed by: PHYSICIAN ASSISTANT

## 2021-08-06 RX ORDER — AZELASTINE 1 MG/ML
1 SPRAY, METERED NASAL 2 TIMES DAILY
Qty: 30 ML | Refills: 0 | Status: SHIPPED | OUTPATIENT
Start: 2021-08-06 | End: 2021-10-11

## 2021-08-06 NOTE — PROGRESS NOTES
Wellness Exam    CC: Patient is presenting for a wellness exam    HPI:   Concerns: Patient presents for recheck of her hypertension.  Pt has been taking medications as instructed, no medication side effects, home BP monitoring in the range of 481'V systolic screening 1-    wnl pt stated   • PONV (postoperative nausea and vomiting)    • Unspecified constipation    • Unspecified essential hypertension    • Visual impairment     glasses/contacts     Past Surgical History:   Procedure Laterality Date   • C Constitutional: Negative for fever, chills and fatigue. HENT: Negative for hearing loss, congestion, sore throat and neck pain. Eyes: Negative for pain and visual disturbance. Respiratory: Negative for cough and shortness of breath.     Cardiovascu No rash noted. No erythema, pallor or jaundice.    Psychiatric: She has a normal mood and affect and her behavior is normal.     Assessment and Plan:  Wander Encinas is a 46year old female here for a wellness exam.  Age appropriate cancer screening, la

## 2021-08-06 NOTE — PATIENT INSTRUCTIONS
Have labs drawn, fasting 8-10 hours prior (water before is ok).    Continue current medications and healthy lifestyle  Schedule repeat mammogram views for December

## 2021-08-10 ENCOUNTER — LAB ENCOUNTER (OUTPATIENT)
Dept: LAB | Age: 51
End: 2021-08-10
Attending: PHYSICIAN ASSISTANT
Payer: COMMERCIAL

## 2021-08-10 DIAGNOSIS — E55.9 VITAMIN D DEFICIENCY: ICD-10-CM

## 2021-08-10 DIAGNOSIS — Z00.00 ROUTINE PHYSICAL EXAMINATION: ICD-10-CM

## 2021-08-10 LAB
ALBUMIN SERPL-MCNC: 4.1 G/DL (ref 3.4–5)
ALBUMIN/GLOB SERPL: 1.1 {RATIO} (ref 1–2)
ALP LIVER SERPL-CCNC: 78 U/L
ALT SERPL-CCNC: 52 U/L
ANION GAP SERPL CALC-SCNC: 8 MMOL/L (ref 0–18)
AST SERPL-CCNC: 24 U/L (ref 15–37)
BASOPHILS # BLD AUTO: 0.06 X10(3) UL (ref 0–0.2)
BASOPHILS NFR BLD AUTO: 0.9 %
BILIRUB SERPL-MCNC: 0.5 MG/DL (ref 0.1–2)
BUN BLD-MCNC: 18 MG/DL (ref 7–18)
CALCIUM BLD-MCNC: 9.7 MG/DL (ref 8.5–10.1)
CHLORIDE SERPL-SCNC: 103 MMOL/L (ref 98–112)
CHOLEST SMN-MCNC: 201 MG/DL (ref ?–200)
CO2 SERPL-SCNC: 27 MMOL/L (ref 21–32)
CREAT BLD-MCNC: 0.93 MG/DL
EOSINOPHIL # BLD AUTO: 0.45 X10(3) UL (ref 0–0.7)
EOSINOPHIL NFR BLD AUTO: 6.4 %
ERYTHROCYTE [DISTWIDTH] IN BLOOD BY AUTOMATED COUNT: 13.1 %
EST. AVERAGE GLUCOSE BLD GHB EST-MCNC: 111 MG/DL (ref 68–126)
GLOBULIN PLAS-MCNC: 3.6 G/DL (ref 2.8–4.4)
GLUCOSE BLD-MCNC: 94 MG/DL (ref 70–99)
HBA1C MFR BLD HPLC: 5.5 % (ref ?–5.7)
HCT VFR BLD AUTO: 46 %
HDLC SERPL-MCNC: 46 MG/DL (ref 40–59)
HGB BLD-MCNC: 14.7 G/DL
IMM GRANULOCYTES # BLD AUTO: 0.03 X10(3) UL (ref 0–1)
IMM GRANULOCYTES NFR BLD: 0.4 %
LDLC SERPL CALC-MCNC: 115 MG/DL (ref ?–100)
LYMPHOCYTES # BLD AUTO: 1.65 X10(3) UL (ref 1–4)
LYMPHOCYTES NFR BLD AUTO: 23.6 %
M PROTEIN MFR SERPL ELPH: 7.7 G/DL (ref 6.4–8.2)
MCH RBC QN AUTO: 28.7 PG (ref 26–34)
MCHC RBC AUTO-ENTMCNC: 32 G/DL (ref 31–37)
MCV RBC AUTO: 89.7 FL
MONOCYTES # BLD AUTO: 0.57 X10(3) UL (ref 0.1–1)
MONOCYTES NFR BLD AUTO: 8.2 %
NEUTROPHILS # BLD AUTO: 4.23 X10 (3) UL (ref 1.5–7.7)
NEUTROPHILS # BLD AUTO: 4.23 X10(3) UL (ref 1.5–7.7)
NEUTROPHILS NFR BLD AUTO: 60.5 %
NONHDLC SERPL-MCNC: 155 MG/DL (ref ?–130)
OSMOLALITY SERPL CALC.SUM OF ELEC: 288 MOSM/KG (ref 275–295)
PATIENT FASTING Y/N/NP: YES
PATIENT FASTING Y/N/NP: YES
PLATELET # BLD AUTO: 231 10(3)UL (ref 150–450)
POTASSIUM SERPL-SCNC: 3.4 MMOL/L (ref 3.5–5.1)
RBC # BLD AUTO: 5.13 X10(6)UL
SODIUM SERPL-SCNC: 138 MMOL/L (ref 136–145)
SP GR UR REFRACTOMETRY: 1.02 (ref 1–1.03)
TRIGL SERPL-MCNC: 232 MG/DL (ref 30–149)
TSI SER-ACNC: 1.64 MIU/ML (ref 0.36–3.74)
VIT D+METAB SERPL-MCNC: 31.9 NG/ML (ref 30–100)
VLDLC SERPL CALC-MCNC: 41 MG/DL (ref 0–30)
WBC # BLD AUTO: 7 X10(3) UL (ref 4–11)

## 2021-08-10 PROCEDURE — 85025 COMPLETE CBC W/AUTO DIFF WBC: CPT

## 2021-08-10 PROCEDURE — 83036 HEMOGLOBIN GLYCOSYLATED A1C: CPT

## 2021-08-10 PROCEDURE — 84443 ASSAY THYROID STIM HORMONE: CPT

## 2021-08-10 PROCEDURE — 36415 COLL VENOUS BLD VENIPUNCTURE: CPT

## 2021-08-10 PROCEDURE — 81001 URINALYSIS AUTO W/SCOPE: CPT

## 2021-08-10 PROCEDURE — 80053 COMPREHEN METABOLIC PANEL: CPT

## 2021-08-10 PROCEDURE — 80061 LIPID PANEL: CPT

## 2021-08-10 PROCEDURE — 82306 VITAMIN D 25 HYDROXY: CPT

## 2021-08-11 ENCOUNTER — TELEPHONE (OUTPATIENT)
Dept: INTERNAL MEDICINE CLINIC | Facility: CLINIC | Age: 51
End: 2021-08-11

## 2021-08-11 DIAGNOSIS — R35.0 URINARY FREQUENCY: ICD-10-CM

## 2021-08-11 DIAGNOSIS — Z00.00 LABORATORY EXAMINATION ORDERED AS PART OF A ROUTINE GENERAL MEDICAL EXAMINATION: ICD-10-CM

## 2021-08-11 DIAGNOSIS — R31.29 OTHER MICROSCOPIC HEMATURIA: ICD-10-CM

## 2021-08-11 DIAGNOSIS — I10 ESSENTIAL HYPERTENSION, BENIGN: Primary | ICD-10-CM

## 2021-08-11 NOTE — TELEPHONE ENCOUNTER
----- Message from Cullen Alexander PA-C sent at 8/11/2021  7:00 AM CDT -----  Still mild hypokalemia on hydrochlorothiazide for hypertension.  Recommend switching to losartan 50mg daily for better BP control without risk of hypokalemia: f/u nurse visit 2

## 2021-08-13 RX ORDER — LOSARTAN POTASSIUM 50 MG/1
50 TABLET ORAL DAILY
Qty: 30 TABLET | Refills: 0 | Status: SHIPPED | OUTPATIENT
Start: 2021-08-13 | End: 2021-08-30

## 2021-08-13 NOTE — TELEPHONE ENCOUNTER
ASHLEY SIMON. Spoke with pt she did have urinary frequency day of UA sample but that has now resolved. She will repeat UA. Order placed. D/w pt change in HTN med she expressed understanding. Rx sent and nurse visit scheduled.

## 2021-08-19 ENCOUNTER — LAB ENCOUNTER (OUTPATIENT)
Dept: LAB | Age: 51
End: 2021-08-19
Attending: PHYSICIAN ASSISTANT
Payer: COMMERCIAL

## 2021-08-19 DIAGNOSIS — Z00.00 LABORATORY EXAMINATION ORDERED AS PART OF A ROUTINE GENERAL MEDICAL EXAMINATION: ICD-10-CM

## 2021-08-19 LAB
BILIRUB UR QL STRIP.AUTO: NEGATIVE
COLOR UR AUTO: YELLOW
GLUCOSE UR STRIP.AUTO-MCNC: NEGATIVE MG/DL
KETONES UR STRIP.AUTO-MCNC: NEGATIVE MG/DL
NITRITE UR QL STRIP.AUTO: NEGATIVE
PH UR STRIP.AUTO: 5 [PH] (ref 5–8)
PROT UR STRIP.AUTO-MCNC: NEGATIVE MG/DL
RBC UR QL AUTO: NEGATIVE
SP GR UR STRIP.AUTO: 1.02 (ref 1–1.03)
UROBILINOGEN UR STRIP.AUTO-MCNC: <2 MG/DL
WBC #/AREA URNS AUTO: >50 /HPF

## 2021-08-19 PROCEDURE — 81001 URINALYSIS AUTO W/SCOPE: CPT

## 2021-08-19 RX ORDER — NITROFURANTOIN 25; 75 MG/1; MG/1
100 CAPSULE ORAL 2 TIMES DAILY
Qty: 10 CAPSULE | Refills: 0 | Status: SHIPPED | OUTPATIENT
Start: 2021-08-19 | End: 2021-09-23 | Stop reason: ALTCHOICE

## 2021-08-19 RX ORDER — CIPROFLOXACIN 500 MG/1
500 TABLET, FILM COATED ORAL 2 TIMES DAILY
Qty: 20 TABLET | Refills: 0 | Status: CANCELLED | OUTPATIENT
Start: 2021-08-19 | End: 2021-08-29

## 2021-08-19 NOTE — TELEPHONE ENCOUNTER
----- Message from Deanna Flores MD sent at 8/19/2021 12:05 PM CDT -----  Microscopic hematuria noted. No cystitis.  Check CT abd/pelvis w and w/o dx hematuria

## 2021-08-19 NOTE — TELEPHONE ENCOUNTER
Per divya start Macrobid 100 mg BID for 5 days. Pt verbalizes  understanding of new medication and plan of care, advised to follow up if s/s worsen or unresolved     Orders placed for medication and f/u images .

## 2021-08-20 ENCOUNTER — TELEPHONE (OUTPATIENT)
Dept: INTERNAL MEDICINE CLINIC | Facility: CLINIC | Age: 51
End: 2021-08-20

## 2021-08-20 ENCOUNTER — HOSPITAL ENCOUNTER (OUTPATIENT)
Dept: CT IMAGING | Age: 51
Discharge: HOME | End: 2021-08-20
Attending: NURSE PRACTITIONER
Payer: COMMERCIAL

## 2021-08-20 DIAGNOSIS — R31.29 OTHER MICROSCOPIC HEMATURIA: Primary | ICD-10-CM

## 2021-08-20 DIAGNOSIS — R31.29 OTHER MICROSCOPIC HEMATURIA: ICD-10-CM

## 2021-08-20 PROCEDURE — 74178 CT ABD&PLV WO CNTR FLWD CNTR: CPT | Performed by: NURSE PRACTITIONER

## 2021-08-20 NOTE — TELEPHONE ENCOUNTER
New order placed and CT abdomen cx. Referral team notified to work on approval as pt is scheduled for this evening. Per referral dept no pre-auth needed for imaging.

## 2021-08-20 NOTE — TELEPHONE ENCOUNTER
King Maxwell from [address] [place] [address] called and stated based on the CT DX an order for a CT and Pelvis W and WO contrast will need to be ordered. Please call if needed.

## 2021-08-30 ENCOUNTER — NURSE ONLY (OUTPATIENT)
Dept: INTERNAL MEDICINE CLINIC | Facility: CLINIC | Age: 51
End: 2021-08-30
Payer: COMMERCIAL

## 2021-08-30 VITALS — DIASTOLIC BLOOD PRESSURE: 86 MMHG | HEART RATE: 88 BPM | SYSTOLIC BLOOD PRESSURE: 120 MMHG

## 2021-08-30 DIAGNOSIS — I10 ESSENTIAL HYPERTENSION, BENIGN: ICD-10-CM

## 2021-08-30 PROCEDURE — 3079F DIAST BP 80-89 MM HG: CPT | Performed by: INTERNAL MEDICINE

## 2021-08-30 PROCEDURE — 3074F SYST BP LT 130 MM HG: CPT | Performed by: INTERNAL MEDICINE

## 2021-08-30 RX ORDER — LOSARTAN POTASSIUM 50 MG/1
50 TABLET ORAL DAILY
Qty: 90 TABLET | Refills: 0 | Status: SHIPPED | OUTPATIENT
Start: 2021-08-30 | End: 2021-12-16

## 2021-08-30 NOTE — PROGRESS NOTES
BP is WDL,pt directed to f/u as advised from [facility].    Losartan refilled to local pharmacy

## 2021-09-29 DIAGNOSIS — F51.04 CHRONIC INSOMNIA: ICD-10-CM

## 2021-09-29 RX ORDER — TRAZODONE HYDROCHLORIDE 50 MG/1
TABLET ORAL
Qty: 30 TABLET | Refills: 1 | Status: SHIPPED | OUTPATIENT
Start: 2021-09-29 | End: 2021-12-05

## 2021-09-29 NOTE — TELEPHONE ENCOUNTER
Requested Prescriptions     Pending Prescriptions Disp Refills   • TRAZODONE 50 MG Oral Tab [Pharmacy Med Name: TRAZODONE 50MG TABLETS] 30 tablet 1     Sig: TAKE 1 TABLET(50 MG) BY MOUTH EVERY NIGHT AS NEEDED FOR SLEEP     Last refill #30 x 1 on 7/19/2021

## 2021-10-10 DIAGNOSIS — R09.82 POSTNASAL DRIP: ICD-10-CM

## 2021-10-11 RX ORDER — AZELASTINE 1 MG/ML
SPRAY, METERED NASAL
Qty: 30 ML | Refills: 0 | Status: SHIPPED | OUTPATIENT
Start: 2021-10-11 | End: 2022-01-31

## 2021-11-22 ENCOUNTER — TELEPHONE (OUTPATIENT)
Dept: INTERNAL MEDICINE CLINIC | Facility: CLINIC | Age: 51
End: 2021-11-22

## 2021-11-29 ENCOUNTER — TELEPHONE (OUTPATIENT)
Dept: INTERNAL MEDICINE CLINIC | Facility: CLINIC | Age: 51
End: 2021-11-29

## 2021-11-29 DIAGNOSIS — N30.00 ACUTE CYSTITIS WITHOUT HEMATURIA: Primary | ICD-10-CM

## 2021-11-29 DIAGNOSIS — R35.0 URINARY FREQUENCY: ICD-10-CM

## 2021-11-29 RX ORDER — NITROFURANTOIN 25; 75 MG/1; MG/1
100 CAPSULE ORAL 2 TIMES DAILY
Qty: 10 CAPSULE | Refills: 0 | Status: SHIPPED | OUTPATIENT
Start: 2021-11-29 | End: 2021-12-04

## 2021-11-29 RX ORDER — NITROFURANTOIN 25; 75 MG/1; MG/1
CAPSULE ORAL
Qty: 10 CAPSULE | Refills: 0 | OUTPATIENT
Start: 2021-11-29

## 2021-11-29 NOTE — TELEPHONE ENCOUNTER
Patient thinks has possible UTI   Urgency,strange odor, since Friday been taking azo but cant stop it           [address] [facility], [address] [facility], [phone], [phone]

## 2021-11-29 NOTE — TELEPHONE ENCOUNTER
Per Dr. Kimberly Grullon 100mg BID x 5 days, f/u in office if symptoms worsen or do not improve after completion of medication. D/w pt above recommendations. She expressed understanding. Rx sent.

## 2021-12-04 DIAGNOSIS — F51.04 CHRONIC INSOMNIA: ICD-10-CM

## 2021-12-05 RX ORDER — TRAZODONE HYDROCHLORIDE 50 MG/1
TABLET ORAL
Qty: 30 TABLET | Refills: 1 | Status: SHIPPED | OUTPATIENT
Start: 2021-12-05 | End: 2022-01-11

## 2021-12-16 DIAGNOSIS — I10 ESSENTIAL HYPERTENSION, BENIGN: ICD-10-CM

## 2021-12-16 RX ORDER — LOSARTAN POTASSIUM 50 MG/1
TABLET ORAL
Qty: 90 TABLET | Refills: 0 | Status: SHIPPED | OUTPATIENT
Start: 2021-12-16

## 2021-12-30 ENCOUNTER — HOSPITAL ENCOUNTER (OUTPATIENT)
Dept: ULTRASOUND IMAGING | Age: 51
Discharge: HOME | End: 2021-12-30
Attending: PHYSICIAN ASSISTANT
Payer: COMMERCIAL

## 2021-12-30 ENCOUNTER — HOSPITAL ENCOUNTER (OUTPATIENT)
Dept: MAMMOGRAPHY | Age: 51
Discharge: HOME | End: 2021-12-30
Attending: PHYSICIAN ASSISTANT
Payer: COMMERCIAL

## 2021-12-30 ENCOUNTER — TELEPHONE (OUTPATIENT)
Dept: INTERNAL MEDICINE CLINIC | Facility: CLINIC | Age: 51
End: 2021-12-30

## 2021-12-30 DIAGNOSIS — R92.8 ABNORMAL MAMMOGRAM: ICD-10-CM

## 2021-12-30 DIAGNOSIS — N60.02 BENIGN CYST OF LEFT BREAST: Primary | ICD-10-CM

## 2021-12-30 DIAGNOSIS — R92.8 ABNORMAL ULTRASOUND OF BREAST: ICD-10-CM

## 2021-12-30 PROCEDURE — 77065 DX MAMMO INCL CAD UNI: CPT | Performed by: PHYSICIAN ASSISTANT

## 2021-12-30 PROCEDURE — 77061 BREAST TOMOSYNTHESIS UNI: CPT | Performed by: PHYSICIAN ASSISTANT

## 2021-12-30 PROCEDURE — 76642 ULTRASOUND BREAST LIMITED: CPT | Performed by: PHYSICIAN ASSISTANT

## 2021-12-30 NOTE — TELEPHONE ENCOUNTER
----- Message from Anton Crump MD sent at 12/30/2021  9:20 AM CST -----  Steffanie- There has been interval stability of a benign cyst in the left breast .  PROBABLY BENIGN FINDING    RECOMMENDATIONS IS SHORT TERM FOLLOW-UP DIAGNOSTIC MAMMOGRAM BILATERAL CARMEN

## 2021-12-30 NOTE — TELEPHONE ENCOUNTER
Provider, Marlyn Oquendo, notified pt of results, no further intervention needed.   Order placed for 6month f/u Mammogram Diagnostic, b/l breast.

## 2022-01-04 ENCOUNTER — IMMUNIZATION (OUTPATIENT)
Dept: LAB | Facility: HOSPITAL | Age: 52
End: 2022-01-04
Attending: EMERGENCY MEDICINE
Payer: COMMERCIAL

## 2022-01-04 DIAGNOSIS — Z23 NEED FOR VACCINATION: Primary | ICD-10-CM

## 2022-01-04 PROCEDURE — 0004A SARSCOV2 VAC 30MCG/0.3ML IM: CPT

## 2022-01-10 DIAGNOSIS — F51.04 CHRONIC INSOMNIA: ICD-10-CM

## 2022-01-11 RX ORDER — TRAZODONE HYDROCHLORIDE 50 MG/1
TABLET ORAL
Qty: 30 TABLET | Refills: 1 | Status: SHIPPED | OUTPATIENT
Start: 2022-01-11

## 2022-01-31 DIAGNOSIS — R09.82 POSTNASAL DRIP: ICD-10-CM

## 2022-01-31 RX ORDER — AZELASTINE 1 MG/ML
SPRAY, METERED NASAL
Qty: 30 ML | Refills: 0 | Status: SHIPPED | OUTPATIENT
Start: 2022-01-31

## 2022-02-09 RX ORDER — LOSARTAN POTASSIUM 50 MG/1
TABLET ORAL
Qty: 90 TABLET | Refills: 0 | OUTPATIENT
Start: 2022-02-09

## 2022-02-09 RX ORDER — LOSARTAN POTASSIUM 50 MG/1
50 TABLET ORAL DAILY
Qty: 90 TABLET | Refills: 0 | OUTPATIENT
Start: 2022-02-09

## 2022-02-09 NOTE — TELEPHONE ENCOUNTER
Rx Losartan 50mg filled 12/16/21 for 90 days, not due until 3/16/21. [facility] sent informing of above.

## 2022-03-28 ENCOUNTER — TELEPHONE (OUTPATIENT)
Dept: INTERNAL MEDICINE CLINIC | Facility: CLINIC | Age: 52
End: 2022-03-28

## 2022-03-28 ENCOUNTER — OFFICE VISIT (OUTPATIENT)
Dept: INTERNAL MEDICINE CLINIC | Facility: CLINIC | Age: 52
End: 2022-03-28
Payer: COMMERCIAL

## 2022-03-28 VITALS
HEART RATE: 80 BPM | TEMPERATURE: 99 F | HEIGHT: 65 IN | RESPIRATION RATE: 12 BRPM | SYSTOLIC BLOOD PRESSURE: 126 MMHG | OXYGEN SATURATION: 97 % | WEIGHT: 173 LBS | BODY MASS INDEX: 28.82 KG/M2 | DIASTOLIC BLOOD PRESSURE: 66 MMHG

## 2022-03-28 DIAGNOSIS — J40 BRONCHITIS: Primary | ICD-10-CM

## 2022-03-28 PROCEDURE — 3074F SYST BP LT 130 MM HG: CPT | Performed by: NURSE PRACTITIONER

## 2022-03-28 PROCEDURE — 3078F DIAST BP <80 MM HG: CPT | Performed by: NURSE PRACTITIONER

## 2022-03-28 PROCEDURE — 3008F BODY MASS INDEX DOCD: CPT | Performed by: NURSE PRACTITIONER

## 2022-03-28 PROCEDURE — 99213 OFFICE O/P EST LOW 20 MIN: CPT | Performed by: NURSE PRACTITIONER

## 2022-03-28 RX ORDER — PREDNISONE 20 MG/1
40 TABLET ORAL DAILY
Qty: 14 TABLET | Refills: 0 | Status: SHIPPED | OUTPATIENT
Start: 2022-03-28 | End: 2022-04-04

## 2022-03-28 NOTE — TELEPHONE ENCOUNTER
Patient called in stating that she was been wheezing for a few weeks. Patient stated she also has a chronic cough and post nasal drip. Patient seeking OV, please advise.

## 2022-03-28 NOTE — TELEPHONE ENCOUNTER
Pt states she has a chronic cough and PND d/t allergies. For the past 3 weeks she has been wheezing, mainly in the morning. Throat is itchy at times. Pt take daily Claritin, uses Flonase and Azelastine daily. Pt would like to be seen in office and would like someone to listen to her lungs. Per [facility]] ok for pt to be seen in office today at 2:15pm. Pt verbalized understanding.

## 2022-04-12 DIAGNOSIS — I10 ESSENTIAL HYPERTENSION, BENIGN: ICD-10-CM

## 2022-04-12 RX ORDER — LOSARTAN POTASSIUM 50 MG/1
50 TABLET ORAL DAILY
Qty: 90 TABLET | Refills: 0 | Status: SHIPPED | OUTPATIENT
Start: 2022-04-12 | End: 2022-08-20

## 2022-04-12 RX ORDER — AZELASTINE 1 MG/ML
SPRAY, METERED NASAL
Qty: 30 ML | Refills: 0 | Status: SHIPPED | OUTPATIENT
Start: 2022-04-12

## 2022-04-12 RX ORDER — TRAZODONE HYDROCHLORIDE 50 MG/1
TABLET ORAL
Qty: 30 TABLET | Refills: 1 | Status: SHIPPED | OUTPATIENT
Start: 2022-04-12

## 2022-04-12 RX ORDER — ALPRAZOLAM 0.25 MG/1
0.25 TABLET ORAL EVERY 6 HOURS PRN
Qty: 30 TABLET | Refills: 0 | Status: SHIPPED | OUTPATIENT
Start: 2022-04-12

## 2022-04-12 RX ORDER — LOSARTAN POTASSIUM 50 MG/1
TABLET ORAL
Qty: 30 TABLET | Refills: 0 | OUTPATIENT
Start: 2022-04-12

## 2022-05-19 DIAGNOSIS — F41.9 ANXIETY: ICD-10-CM

## 2022-05-19 RX ORDER — ALPRAZOLAM 0.25 MG/1
TABLET ORAL
Qty: 30 TABLET | Refills: 0 | Status: SHIPPED | OUTPATIENT
Start: 2022-05-19

## 2022-05-19 NOTE — TELEPHONE ENCOUNTER
Alprazolam  Last time medication was refilled 4/12/22  Quantity and # of refills 30 tab  Last OV 3/28/22  Next OV no apt scheduled

## 2022-06-09 ENCOUNTER — PATIENT MESSAGE (OUTPATIENT)
Dept: INTERNAL MEDICINE CLINIC | Facility: CLINIC | Age: 52
End: 2022-06-09

## 2022-06-09 DIAGNOSIS — M79.672 PAIN IN BOTH FEET: Primary | ICD-10-CM

## 2022-06-09 DIAGNOSIS — M79.671 PAIN IN BOTH FEET: Primary | ICD-10-CM

## 2022-06-09 NOTE — TELEPHONE ENCOUNTER
Referral order placed, patient given contact and address information via [employer]. Shahzad Menjivar, [address]  [facility], [address] [facility]  [facility]

## 2022-06-09 NOTE — TELEPHONE ENCOUNTER
From: Julee Hummel  To: Soledad Dorsey PA-C  Sent: 6/9/2022 11:13 AM CDT  Subject: Podiatrist    Good morning. Can you recommend a podiatrist? I have orthotics and would like to have a new set.

## 2022-06-30 ENCOUNTER — OFFICE VISIT (OUTPATIENT)
Dept: PODIATRY CLINIC | Facility: CLINIC | Age: 52
End: 2022-06-30
Payer: COMMERCIAL

## 2022-06-30 DIAGNOSIS — M72.2 PLANTAR FASCIITIS, RIGHT: Primary | ICD-10-CM

## 2022-06-30 DIAGNOSIS — M79.672 LEFT FOOT PAIN: ICD-10-CM

## 2022-06-30 DIAGNOSIS — M72.2 PLANTAR FASCIITIS, LEFT: ICD-10-CM

## 2022-06-30 DIAGNOSIS — M79.671 RIGHT FOOT PAIN: ICD-10-CM

## 2022-06-30 DIAGNOSIS — Q82.8 POROKERATOSIS: ICD-10-CM

## 2022-06-30 PROCEDURE — 99203 OFFICE O/P NEW LOW 30 MIN: CPT | Performed by: STUDENT IN AN ORGANIZED HEALTH CARE EDUCATION/TRAINING PROGRAM

## 2022-06-30 NOTE — PATIENT INSTRUCTIONS
-Wear supportive shoe gear and inserts at all times with ambulation.   Avoid walking barefoot.  -Perform stretching exercises 3-5 times daily.  -Return once authorization is complete for custom inserts.  -Can consider cortisone injection if symptoms worsen or fail to improve.  -Follow-up in 4 to 6 weeks for reevaluation if symptoms persist.

## 2022-07-01 ENCOUNTER — HOSPITAL ENCOUNTER (OUTPATIENT)
Dept: ULTRASOUND IMAGING | Age: 52
Discharge: HOME | End: 2022-07-01
Attending: INTERNAL MEDICINE
Payer: COMMERCIAL

## 2022-07-01 ENCOUNTER — HOSPITAL ENCOUNTER (OUTPATIENT)
Dept: MAMMOGRAPHY | Age: 52
Discharge: HOME | End: 2022-07-01
Attending: INTERNAL MEDICINE
Payer: COMMERCIAL

## 2022-07-01 DIAGNOSIS — N60.02 BENIGN CYST OF LEFT BREAST: ICD-10-CM

## 2022-07-01 PROCEDURE — 76642 ULTRASOUND BREAST LIMITED: CPT | Performed by: INTERNAL MEDICINE

## 2022-07-01 PROCEDURE — 77062 BREAST TOMOSYNTHESIS BI: CPT | Performed by: INTERNAL MEDICINE

## 2022-07-01 PROCEDURE — 77066 DX MAMMO INCL CAD BI: CPT | Performed by: INTERNAL MEDICINE

## 2022-07-06 ENCOUNTER — OFFICE VISIT (OUTPATIENT)
Dept: PODIATRY CLINIC | Facility: CLINIC | Age: 52
End: 2022-07-06
Payer: COMMERCIAL

## 2022-07-06 VITALS — SYSTOLIC BLOOD PRESSURE: 124 MMHG | HEART RATE: 80 BPM | DIASTOLIC BLOOD PRESSURE: 78 MMHG

## 2022-07-06 DIAGNOSIS — M72.2 PLANTAR FASCIITIS, RIGHT: Primary | ICD-10-CM

## 2022-07-06 DIAGNOSIS — M79.671 RIGHT FOOT PAIN: ICD-10-CM

## 2022-07-06 DIAGNOSIS — M79.672 LEFT FOOT PAIN: ICD-10-CM

## 2022-07-06 DIAGNOSIS — M72.2 PLANTAR FASCIITIS, LEFT: ICD-10-CM

## 2022-07-06 PROCEDURE — 20550 NJX 1 TENDON SHEATH/LIGAMENT: CPT | Performed by: STUDENT IN AN ORGANIZED HEALTH CARE EDUCATION/TRAINING PROGRAM

## 2022-07-06 PROCEDURE — 3078F DIAST BP <80 MM HG: CPT | Performed by: STUDENT IN AN ORGANIZED HEALTH CARE EDUCATION/TRAINING PROGRAM

## 2022-07-06 PROCEDURE — L3000 FT INSERT UCB BERKELEY SHELL: HCPCS | Performed by: STUDENT IN AN ORGANIZED HEALTH CARE EDUCATION/TRAINING PROGRAM

## 2022-07-06 PROCEDURE — 3074F SYST BP LT 130 MM HG: CPT | Performed by: STUDENT IN AN ORGANIZED HEALTH CARE EDUCATION/TRAINING PROGRAM

## 2022-07-06 RX ORDER — DEXAMETHASONE SODIUM PHOSPHATE 4 MG/ML
2 VIAL (ML) INJECTION ONCE
Status: COMPLETED | OUTPATIENT
Start: 2022-07-06 | End: 2022-07-06

## 2022-07-06 RX ADMIN — DEXAMETHASONE SODIUM PHOSPHATE 2 MG: 4 MG/ML VIAL (ML) INJECTION at 15:20:00

## 2022-07-06 RX ADMIN — DEXAMETHASONE SODIUM PHOSPHATE 2 MG: 4 MG/ML VIAL (ML) INJECTION at 15:34:00

## 2022-07-06 NOTE — PROGRESS NOTES
Per verbal order from Dr. Maegan Mae 0.5mL of kenalog, 0.5 dextamethasone lidocaine & 1.0mL of 0.5% marcaine was drawn up and injected into the patient's bilateral feet by the provider.      Na Hernandez RN

## 2022-07-10 NOTE — PATIENT INSTRUCTIONS
-Wear supportive shoe gear and inserts at all times with ambulation. Avoid walking barefoot.  -Perform stretching exercises 3-5 times daily. -Monitor response to steroid injections.  -We will call once orthotics are ready for pickup.

## 2022-07-27 ENCOUNTER — TELEPHONE (OUTPATIENT)
Dept: PODIATRY CLINIC | Facility: CLINIC | Age: 52
End: 2022-07-27

## 2022-08-18 ENCOUNTER — TELEPHONE (OUTPATIENT)
Dept: PODIATRY CLINIC | Facility: CLINIC | Age: 52
End: 2022-08-18

## 2022-08-18 NOTE — TELEPHONE ENCOUNTER
Pt called stating pt received a call that orthotics are ready for pick. Pt's  will be picking up today 8-18-22.

## 2022-08-20 DIAGNOSIS — I10 ESSENTIAL HYPERTENSION, BENIGN: ICD-10-CM

## 2022-08-22 RX ORDER — LOSARTAN POTASSIUM 50 MG/1
50 TABLET ORAL DAILY
Qty: 90 TABLET | Refills: 0 | Status: SHIPPED | OUTPATIENT
Start: 2022-08-22

## 2022-08-29 ENCOUNTER — TELEMEDICINE (OUTPATIENT)
Dept: INTERNAL MEDICINE CLINIC | Facility: CLINIC | Age: 52
End: 2022-08-29
Payer: COMMERCIAL

## 2022-08-29 DIAGNOSIS — J06.9 VIRAL URI: Primary | ICD-10-CM

## 2022-08-29 NOTE — PATIENT INSTRUCTIONS
Coricidin HBP as needed for congestion  Also add ibuprofen 400-600mg up to three times daily as needed for pain. Take with food.

## 2022-09-07 ENCOUNTER — OFFICE VISIT (OUTPATIENT)
Dept: PODIATRY CLINIC | Facility: CLINIC | Age: 52
End: 2022-09-07
Payer: COMMERCIAL

## 2022-09-07 VITALS — DIASTOLIC BLOOD PRESSURE: 74 MMHG | SYSTOLIC BLOOD PRESSURE: 132 MMHG

## 2022-09-07 DIAGNOSIS — M72.2 PLANTAR FASCIITIS, LEFT: ICD-10-CM

## 2022-09-07 DIAGNOSIS — M79.672 LEFT FOOT PAIN: ICD-10-CM

## 2022-09-07 DIAGNOSIS — M72.2 PLANTAR FASCIITIS, RIGHT: Primary | ICD-10-CM

## 2022-09-07 DIAGNOSIS — M79.671 RIGHT FOOT PAIN: ICD-10-CM

## 2022-09-07 DIAGNOSIS — M76.62 TENDONITIS, ACHILLES, LEFT: ICD-10-CM

## 2022-09-07 PROCEDURE — 3075F SYST BP GE 130 - 139MM HG: CPT | Performed by: STUDENT IN AN ORGANIZED HEALTH CARE EDUCATION/TRAINING PROGRAM

## 2022-09-07 PROCEDURE — 20550 NJX 1 TENDON SHEATH/LIGAMENT: CPT | Performed by: STUDENT IN AN ORGANIZED HEALTH CARE EDUCATION/TRAINING PROGRAM

## 2022-09-07 PROCEDURE — 3078F DIAST BP <80 MM HG: CPT | Performed by: STUDENT IN AN ORGANIZED HEALTH CARE EDUCATION/TRAINING PROGRAM

## 2022-09-07 RX ORDER — DEXAMETHASONE SODIUM PHOSPHATE 4 MG/ML
2 VIAL (ML) INJECTION ONCE
Status: COMPLETED | OUTPATIENT
Start: 2022-09-07 | End: 2022-09-07

## 2022-09-07 NOTE — PROGRESS NOTES
Per Dr Tino Gupta  to draw up .5ml of dexamethasone sodium phosphate, .5ml kenalog-10 and 1ml marcaine 0.5% for a right foot injection.

## 2022-09-09 NOTE — PATIENT INSTRUCTIONS
-Wear supportive shoe gear and inserts at all times with ambulation. Avoid walking barefoot.  -Perform stretching exercises 3-5 times daily. -Monitor response to steroid injection.  -Follow-up in 1 month for reevaluation if pain persists.

## 2022-10-04 ENCOUNTER — OFFICE VISIT (OUTPATIENT)
Dept: FAMILY MEDICINE CLINIC | Facility: CLINIC | Age: 52
End: 2022-10-04
Payer: COMMERCIAL

## 2022-10-04 ENCOUNTER — TELEPHONE (OUTPATIENT)
Dept: INTERNAL MEDICINE CLINIC | Facility: CLINIC | Age: 52
End: 2022-10-04

## 2022-10-04 VITALS
DIASTOLIC BLOOD PRESSURE: 62 MMHG | HEIGHT: 65 IN | BODY MASS INDEX: 29 KG/M2 | OXYGEN SATURATION: 97 % | TEMPERATURE: 98 F | HEART RATE: 81 BPM | RESPIRATION RATE: 14 BRPM | SYSTOLIC BLOOD PRESSURE: 104 MMHG

## 2022-10-04 DIAGNOSIS — R39.9 UTI SYMPTOMS: ICD-10-CM

## 2022-10-04 DIAGNOSIS — N30.01 ACUTE CYSTITIS WITH HEMATURIA: Primary | ICD-10-CM

## 2022-10-04 LAB
BILIRUBIN: NEGATIVE
GLUCOSE (URINE DIPSTICK): NEGATIVE MG/DL
KETONES (URINE DIPSTICK): NEGATIVE MG/DL
MULTISTIX LOT#: ABNORMAL NUMERIC
NITRITE, URINE: POSITIVE
PH, URINE: 5.5 (ref 4.5–8)
PROTEIN (URINE DIPSTICK): NEGATIVE MG/DL
SPECIFIC GRAVITY: 1.02 (ref 1–1.03)
URINE-COLOR: YELLOW
UROBILINOGEN,SEMI-QN: 0.2 MG/DL (ref 0–1.9)

## 2022-10-04 PROCEDURE — 81003 URINALYSIS AUTO W/O SCOPE: CPT | Performed by: FAMILY MEDICINE

## 2022-10-04 PROCEDURE — 87186 SC STD MICRODIL/AGAR DIL: CPT | Performed by: FAMILY MEDICINE

## 2022-10-04 PROCEDURE — 3074F SYST BP LT 130 MM HG: CPT | Performed by: FAMILY MEDICINE

## 2022-10-04 PROCEDURE — 87086 URINE CULTURE/COLONY COUNT: CPT | Performed by: FAMILY MEDICINE

## 2022-10-04 PROCEDURE — 87077 CULTURE AEROBIC IDENTIFY: CPT | Performed by: FAMILY MEDICINE

## 2022-10-04 PROCEDURE — 99213 OFFICE O/P EST LOW 20 MIN: CPT | Performed by: FAMILY MEDICINE

## 2022-10-04 PROCEDURE — 3078F DIAST BP <80 MM HG: CPT | Performed by: FAMILY MEDICINE

## 2022-10-04 RX ORDER — NITROFURANTOIN 25; 75 MG/1; MG/1
100 CAPSULE ORAL 2 TIMES DAILY
Qty: 10 CAPSULE | Refills: 0 | Status: SHIPPED | OUTPATIENT
Start: 2022-10-04

## 2022-10-04 NOTE — TELEPHONE ENCOUNTER
Pt said that she has chronic UTIs and has running urine tests ordered on a regular basis. Would like another test ordered so she can complete it as soon as possible. She wanted to speak to a nurse. Anderson venegas     Last OV televisit 08/29/2022  No future appointments.

## 2022-10-27 ENCOUNTER — OFFICE VISIT (OUTPATIENT)
Dept: FAMILY MEDICINE CLINIC | Facility: CLINIC | Age: 52
End: 2022-10-27
Payer: COMMERCIAL

## 2022-10-27 VITALS
WEIGHT: 170 LBS | HEART RATE: 100 BPM | DIASTOLIC BLOOD PRESSURE: 79 MMHG | TEMPERATURE: 98 F | SYSTOLIC BLOOD PRESSURE: 128 MMHG | RESPIRATION RATE: 18 BRPM | BODY MASS INDEX: 28.32 KG/M2 | OXYGEN SATURATION: 98 % | HEIGHT: 65 IN

## 2022-10-27 DIAGNOSIS — J02.9 SORE THROAT: ICD-10-CM

## 2022-10-27 DIAGNOSIS — J06.9 VIRAL URI WITH COUGH: Primary | ICD-10-CM

## 2022-10-27 LAB
CONTROL LINE PRESENT WITH A CLEAR BACKGROUND (YES/NO): YES YES/NO
KIT LOT #: NORMAL NUMERIC
STREP GRP A CUL-SCR: NEGATIVE

## 2022-10-27 PROCEDURE — 87880 STREP A ASSAY W/OPTIC: CPT | Performed by: NURSE PRACTITIONER

## 2022-10-27 PROCEDURE — 3074F SYST BP LT 130 MM HG: CPT | Performed by: NURSE PRACTITIONER

## 2022-10-27 PROCEDURE — 99213 OFFICE O/P EST LOW 20 MIN: CPT | Performed by: NURSE PRACTITIONER

## 2022-10-27 PROCEDURE — 3008F BODY MASS INDEX DOCD: CPT | Performed by: NURSE PRACTITIONER

## 2022-10-27 PROCEDURE — 3078F DIAST BP <80 MM HG: CPT | Performed by: NURSE PRACTITIONER

## 2022-10-27 NOTE — PATIENT INSTRUCTIONS
Push fluids and rest  You can take an over the counter cold/ flu medication if needed  Follow up for any new or worsening symptoms or if not improving over the next few days.

## 2022-10-29 LAB — SARS-COV-2 RNA RESP QL NAA+PROBE: NOT DETECTED

## 2022-10-31 ENCOUNTER — TELEMEDICINE (OUTPATIENT)
Dept: INTERNAL MEDICINE CLINIC | Facility: CLINIC | Age: 52
End: 2022-10-31
Payer: COMMERCIAL

## 2022-10-31 DIAGNOSIS — J01.10 ACUTE NON-RECURRENT FRONTAL SINUSITIS: Primary | ICD-10-CM

## 2022-10-31 DIAGNOSIS — J22 ACUTE LOWER RESPIRATORY INFECTION: ICD-10-CM

## 2022-10-31 PROCEDURE — 99213 OFFICE O/P EST LOW 20 MIN: CPT | Performed by: PHYSICIAN ASSISTANT

## 2022-10-31 RX ORDER — AMOXICILLIN AND CLAVULANATE POTASSIUM 875; 125 MG/1; MG/1
1 TABLET, FILM COATED ORAL 2 TIMES DAILY
Qty: 20 TABLET | Refills: 0 | Status: SHIPPED | OUTPATIENT
Start: 2022-10-31 | End: 2022-11-10

## 2022-10-31 RX ORDER — BENZONATATE 200 MG/1
200 CAPSULE ORAL 3 TIMES DAILY PRN
Qty: 20 CAPSULE | Refills: 0 | Status: SHIPPED | OUTPATIENT
Start: 2022-10-31

## 2022-10-31 RX ORDER — PREDNISONE 20 MG/1
40 TABLET ORAL DAILY
Qty: 14 TABLET | Refills: 0 | Status: SHIPPED | OUTPATIENT
Start: 2022-10-31 | End: 2022-11-07

## 2022-11-16 DIAGNOSIS — I10 ESSENTIAL HYPERTENSION, BENIGN: ICD-10-CM

## 2022-11-16 RX ORDER — LOSARTAN POTASSIUM 50 MG/1
TABLET ORAL
Qty: 90 TABLET | Refills: 0 | Status: SHIPPED | OUTPATIENT
Start: 2022-11-16

## 2022-11-16 NOTE — TELEPHONE ENCOUNTER
Last time medication was refilled 8/22/22  Quantity and # of refills 90/0  Last OV 10/31/22  Next OV none scheduled

## 2022-11-18 ENCOUNTER — TELEPHONE (OUTPATIENT)
Dept: INTERNAL MEDICINE CLINIC | Facility: CLINIC | Age: 52
End: 2022-11-18

## 2022-11-18 ENCOUNTER — OFFICE VISIT (OUTPATIENT)
Dept: INTERNAL MEDICINE CLINIC | Facility: CLINIC | Age: 52
End: 2022-11-18
Payer: COMMERCIAL

## 2022-11-18 VITALS
HEART RATE: 80 BPM | BODY MASS INDEX: 30.16 KG/M2 | RESPIRATION RATE: 16 BRPM | SYSTOLIC BLOOD PRESSURE: 118 MMHG | TEMPERATURE: 98 F | OXYGEN SATURATION: 99 % | WEIGHT: 181 LBS | HEIGHT: 65 IN | DIASTOLIC BLOOD PRESSURE: 88 MMHG

## 2022-11-18 DIAGNOSIS — R35.0 URINE FREQUENCY: ICD-10-CM

## 2022-11-18 DIAGNOSIS — N30.90 CYSTITIS: Primary | ICD-10-CM

## 2022-11-18 LAB
APPEARANCE: CLEAR
BILIRUBIN: NEGATIVE
GLUCOSE (URINE DIPSTICK): NEGATIVE MG/DL
KETONES (URINE DIPSTICK): NEGATIVE MG/DL
MULTISTIX LOT#: ABNORMAL NUMERIC
NITRITE, URINE: POSITIVE
OCCULT BLOOD: NEGATIVE
PH, URINE: 7 (ref 4.5–8)
PROTEIN (URINE DIPSTICK): NEGATIVE MG/DL
SPECIFIC GRAVITY: 1.01 (ref 1–1.03)
URINE-COLOR: YELLOW
UROBILINOGEN,SEMI-QN: 0.2 MG/DL (ref 0–1.9)

## 2022-11-18 PROCEDURE — 87186 SC STD MICRODIL/AGAR DIL: CPT | Performed by: NURSE PRACTITIONER

## 2022-11-18 PROCEDURE — 3008F BODY MASS INDEX DOCD: CPT | Performed by: NURSE PRACTITIONER

## 2022-11-18 PROCEDURE — 87086 URINE CULTURE/COLONY COUNT: CPT | Performed by: NURSE PRACTITIONER

## 2022-11-18 PROCEDURE — 81003 URINALYSIS AUTO W/O SCOPE: CPT | Performed by: NURSE PRACTITIONER

## 2022-11-18 PROCEDURE — 87088 URINE BACTERIA CULTURE: CPT | Performed by: NURSE PRACTITIONER

## 2022-11-18 PROCEDURE — 3079F DIAST BP 80-89 MM HG: CPT | Performed by: NURSE PRACTITIONER

## 2022-11-18 PROCEDURE — 3074F SYST BP LT 130 MM HG: CPT | Performed by: NURSE PRACTITIONER

## 2022-11-18 PROCEDURE — 99213 OFFICE O/P EST LOW 20 MIN: CPT | Performed by: NURSE PRACTITIONER

## 2022-11-18 RX ORDER — CEPHALEXIN 500 MG/1
500 CAPSULE ORAL 3 TIMES DAILY
Qty: 21 CAPSULE | Refills: 0 | Status: SHIPPED | OUTPATIENT
Start: 2022-11-18

## 2022-12-08 ENCOUNTER — PATIENT MESSAGE (OUTPATIENT)
Dept: INTERNAL MEDICINE CLINIC | Facility: CLINIC | Age: 52
End: 2022-12-08

## 2022-12-08 DIAGNOSIS — N60.02 BREAST CYST, LEFT: Primary | ICD-10-CM

## 2022-12-09 NOTE — TELEPHONE ENCOUNTER
From: Alesia Day  To: Edle Hua PA-C  Sent: 12/8/2022 5:18 PM CST  Subject: Mammogram    I am due for a follow-up mammogram and the letter stated that I should ask you for an order for an ultrasound.

## 2022-12-13 ENCOUNTER — OFFICE VISIT (OUTPATIENT)
Dept: PODIATRY CLINIC | Facility: CLINIC | Age: 52
End: 2022-12-13
Payer: COMMERCIAL

## 2022-12-13 VITALS — HEART RATE: 80 BPM | DIASTOLIC BLOOD PRESSURE: 82 MMHG | SYSTOLIC BLOOD PRESSURE: 120 MMHG

## 2022-12-13 DIAGNOSIS — M79.672 LEFT FOOT PAIN: ICD-10-CM

## 2022-12-13 DIAGNOSIS — M72.2 PLANTAR FASCIITIS, RIGHT: Primary | ICD-10-CM

## 2022-12-13 DIAGNOSIS — Q82.8 POROKERATOSIS: ICD-10-CM

## 2022-12-13 DIAGNOSIS — M79.671 RIGHT FOOT PAIN: ICD-10-CM

## 2022-12-13 DIAGNOSIS — M72.2 PLANTAR FASCIITIS, LEFT: ICD-10-CM

## 2022-12-13 PROCEDURE — 3079F DIAST BP 80-89 MM HG: CPT | Performed by: STUDENT IN AN ORGANIZED HEALTH CARE EDUCATION/TRAINING PROGRAM

## 2022-12-13 PROCEDURE — 3074F SYST BP LT 130 MM HG: CPT | Performed by: STUDENT IN AN ORGANIZED HEALTH CARE EDUCATION/TRAINING PROGRAM

## 2022-12-13 PROCEDURE — 20550 NJX 1 TENDON SHEATH/LIGAMENT: CPT | Performed by: STUDENT IN AN ORGANIZED HEALTH CARE EDUCATION/TRAINING PROGRAM

## 2022-12-13 RX ADMIN — TRIAMCINOLONE ACETONIDE 40 MG: 40 INJECTION, SUSPENSION INTRA-ARTICULAR; INTRAMUSCULAR at 16:20:00

## 2022-12-13 NOTE — PROGRESS NOTES
Per verbal order from Dr. Kane Jeronimo 1mL of kenalog, & 1mL of 0.5% marcaine was drawn up and injected into the patient's bilateral foot by the provider. Consent and vitals obtained prior to injection by provider.

## 2022-12-14 RX ORDER — TRIAMCINOLONE ACETONIDE 40 MG/ML
40 INJECTION, SUSPENSION INTRA-ARTICULAR; INTRAMUSCULAR ONCE
Status: COMPLETED | OUTPATIENT
Start: 2022-12-14 | End: 2022-12-13

## 2022-12-14 NOTE — PATIENT INSTRUCTIONS
-Wear supportive shoe gear and inserts at all times with ambulation. Avoid walking barefoot.  -Perform stretching exercises 3-5 times daily. Complete physical therapy as prescribed. -Monitor response to steroid injection.  -Follow-up in 1 month for reevaluation.

## 2023-01-06 ENCOUNTER — PATIENT MESSAGE (OUTPATIENT)
Dept: INTERNAL MEDICINE CLINIC | Facility: CLINIC | Age: 53
End: 2023-01-06

## 2023-01-06 DIAGNOSIS — R06.83 SNORING: Primary | ICD-10-CM

## 2023-01-06 NOTE — TELEPHONE ENCOUNTER
Per Juancho Vail, have pt schedule annual physical and it can be ordered then since not aware of her sleep problem

## 2023-01-06 NOTE — TELEPHONE ENCOUNTER
From: Shady Strong  To: Ciarra White PA-C  Sent: 1/6/2023 10:26 AM CST  Subject: Sleep study    Can you order a sleep study for me? I think we talked about it when I wasn't feeling well and agreed to do it once I was better. Thank you and Happy New Year!

## 2023-01-16 ENCOUNTER — HOSPITAL ENCOUNTER (OUTPATIENT)
Dept: ULTRASOUND IMAGING | Age: 53
Discharge: HOME | End: 2023-01-16
Attending: PHYSICIAN ASSISTANT
Payer: COMMERCIAL

## 2023-01-16 ENCOUNTER — OFFICE VISIT (OUTPATIENT)
Dept: INTERNAL MEDICINE CLINIC | Facility: CLINIC | Age: 53
End: 2023-01-16
Payer: COMMERCIAL

## 2023-01-16 ENCOUNTER — HOSPITAL ENCOUNTER (OUTPATIENT)
Dept: ULTRASOUND IMAGING | Age: 53
Discharge: HOME | End: 2023-01-16
Attending: NURSE PRACTITIONER
Payer: COMMERCIAL

## 2023-01-16 VITALS
WEIGHT: 180 LBS | BODY MASS INDEX: 30 KG/M2 | RESPIRATION RATE: 20 BRPM | TEMPERATURE: 98 F | SYSTOLIC BLOOD PRESSURE: 122 MMHG | HEART RATE: 70 BPM | OXYGEN SATURATION: 99 % | DIASTOLIC BLOOD PRESSURE: 80 MMHG

## 2023-01-16 DIAGNOSIS — Z90.710 HISTORY OF HYSTERECTOMY: ICD-10-CM

## 2023-01-16 DIAGNOSIS — R09.82 POST-NASAL DRIP: ICD-10-CM

## 2023-01-16 DIAGNOSIS — R10.2 PELVIC PAIN: ICD-10-CM

## 2023-01-16 DIAGNOSIS — R06.83 SNORING: ICD-10-CM

## 2023-01-16 DIAGNOSIS — Z00.00 ANNUAL PHYSICAL EXAM: Primary | ICD-10-CM

## 2023-01-16 DIAGNOSIS — R53.82 CHRONIC FATIGUE: ICD-10-CM

## 2023-01-16 DIAGNOSIS — N60.02 BREAST CYST, LEFT: ICD-10-CM

## 2023-01-16 PROCEDURE — 76830 TRANSVAGINAL US NON-OB: CPT | Performed by: NURSE PRACTITIONER

## 2023-01-16 PROCEDURE — 99396 PREV VISIT EST AGE 40-64: CPT | Performed by: NURSE PRACTITIONER

## 2023-01-16 PROCEDURE — 3079F DIAST BP 80-89 MM HG: CPT | Performed by: NURSE PRACTITIONER

## 2023-01-16 PROCEDURE — 76856 US EXAM PELVIC COMPLETE: CPT | Performed by: NURSE PRACTITIONER

## 2023-01-16 PROCEDURE — 76642 ULTRASOUND BREAST LIMITED: CPT | Performed by: PHYSICIAN ASSISTANT

## 2023-01-16 PROCEDURE — 3074F SYST BP LT 130 MM HG: CPT | Performed by: NURSE PRACTITIONER

## 2023-01-16 RX ORDER — MONTELUKAST SODIUM 10 MG/1
10 TABLET ORAL DAILY
Qty: 90 TABLET | Refills: 3 | Status: SHIPPED | OUTPATIENT
Start: 2023-01-16 | End: 2024-01-11

## 2023-01-26 ENCOUNTER — PATIENT MESSAGE (OUTPATIENT)
Dept: INTERNAL MEDICINE CLINIC | Facility: CLINIC | Age: 53
End: 2023-01-26

## 2023-01-26 DIAGNOSIS — S62.102A CLOSED FRACTURE OF LEFT WRIST, INITIAL ENCOUNTER: Primary | ICD-10-CM

## 2023-01-26 DIAGNOSIS — S69.92XA INJURY OF LEFT WRIST, INITIAL ENCOUNTER: ICD-10-CM

## 2023-01-26 NOTE — TELEPHONE ENCOUNTER
From: Araseli Gaxiola  To: Vee Das PA-C  Sent: 1/26/2023 7:46 AM CST  Subject: broken wrist    I fractured my wrist yesterday and would appreciate an orthopedic recommendation.

## 2023-01-27 LAB
AMB EXT BILIRUBIN, TOTAL: 0.3 MG/DL
AMB EXT BUN: 19 MG/DL
AMB EXT CALCIUM: 10.1
AMB EXT CARBON DIOXIDE: 26
AMB EXT CHLORIDE: 107
AMB EXT CHOL/HDL RATIO: 305
AMB EXT CHOLESTEROL, TOTAL: 182 MG/DL
AMB EXT CMP ALT: 23 U/L
AMB EXT CMP AST: 17 U/L
AMB EXT CREATININE: 1.03 MG/DL
AMB EXT EGFR NON-AA: 65
AMB EXT GLUCOSE: 67 MG/DL
AMB EXT HDL CHOLESTEROL: 52 MG/DL
AMB EXT HEMATOCRIT: 43.6
AMB EXT LDL CHOLESTEROL, DIRECT: 95 MG/DL
AMB EXT MCV: 89
AMB EXT PLATELETS: 285
AMB EXT POSTASSIUM: 4.3 MMOL/L
AMB EXT SODIUM: 143 MMOL/L
AMB EXT TOTAL PROTEIN: 6.9
AMB EXT TRIGLYCERIDES: 208 MG/DL
AMB EXT WBC: 7.1 X10(3)UL

## 2023-01-30 ENCOUNTER — TELEPHONE (OUTPATIENT)
Dept: INTERNAL MEDICINE CLINIC | Facility: CLINIC | Age: 53
End: 2023-01-30

## 2023-01-30 NOTE — TELEPHONE ENCOUNTER
Lab results faxed to office dated 01/27/23. Per MEÑO MurciaN: Lipid, electrolytes, kidney, liver and CBC WNL. Recommend heart healthy diet with 150 minutes of moderate cardiovascular exercise weekly. Patient notified. Labs added to external console.

## 2023-02-01 ENCOUNTER — OFFICE VISIT (OUTPATIENT)
Facility: LOCATION | Age: 53
End: 2023-02-01
Payer: COMMERCIAL

## 2023-02-01 DIAGNOSIS — J31.0 CHRONIC RHINITIS: ICD-10-CM

## 2023-02-01 DIAGNOSIS — H93.293 ABNORMAL AUDITORY PERCEPTION OF BOTH EARS: Primary | ICD-10-CM

## 2023-02-01 DIAGNOSIS — H69.93 UNSPECIFIED EUSTACHIAN TUBE DISORDER, BILATERAL: Primary | ICD-10-CM

## 2023-02-01 DIAGNOSIS — H91.93 AUDITORY IMPAIRMENT, BILATERAL: ICD-10-CM

## 2023-02-01 PROCEDURE — 92567 TYMPANOMETRY: CPT | Performed by: AUDIOLOGIST

## 2023-02-01 PROCEDURE — 92557 COMPREHENSIVE HEARING TEST: CPT | Performed by: AUDIOLOGIST

## 2023-02-01 PROCEDURE — 99203 OFFICE O/P NEW LOW 30 MIN: CPT | Performed by: OTOLARYNGOLOGY

## 2023-02-01 RX ORDER — IPRATROPIUM BROMIDE 42 UG/1
2 SPRAY, METERED NASAL 2 TIMES DAILY
Qty: 15 ML | Refills: 5 | Status: SHIPPED | OUTPATIENT
Start: 2023-02-01

## 2023-02-02 ENCOUNTER — OFFICE VISIT (OUTPATIENT)
Dept: SLEEP CENTER | Age: 53
End: 2023-02-02
Attending: Other
Payer: COMMERCIAL

## 2023-02-02 DIAGNOSIS — R53.82 CHRONIC FATIGUE: ICD-10-CM

## 2023-02-02 DIAGNOSIS — R06.83 SNORING: ICD-10-CM

## 2023-02-02 PROCEDURE — 95806 SLEEP STUDY UNATT&RESP EFFT: CPT

## 2023-02-06 ENCOUNTER — APPOINTMENT (OUTPATIENT)
Dept: FAMILY MEDICINE CLINIC | Facility: CLINIC | Age: 53
End: 2023-02-06
Payer: COMMERCIAL

## 2023-02-07 ENCOUNTER — SLEEP STUDY (OUTPATIENT)
Dept: FAMILY MEDICINE CLINIC | Facility: CLINIC | Age: 53
End: 2023-02-07
Payer: COMMERCIAL

## 2023-02-07 DIAGNOSIS — G47.34 NOCTURNAL HYPOXEMIA: Primary | ICD-10-CM

## 2023-02-07 PROCEDURE — 95806 SLEEP STUDY UNATT&RESP EFFT: CPT | Performed by: FAMILY MEDICINE

## 2023-02-07 NOTE — PROCEDURES
[address] [address]       Accredited by the [employer] (AASM)    PATIENT'S NAME:        [facility]  ATTENDING PHYSICIAN:   Kalie Knowles MD  REFERRING PHYSICIAN:   Jonah Hewitt DO  PATIENT ACCOUNT #:     [de-identified]        LOCATION:       [address] [place] [address] #:      [id number]        YOB: 1970  DATE OF STUDY:         02/02/2023    SLEEP STUDY REPORT    STUDY TYPE:  Unattended sleep study. HISTORY:  This is a 49-year-old female who underwent a home sleep study due to snoring and chronic fatigue, ordered by Clare Giordano NP.      UNATTENDED SLEEP STUDY RECORDING PARAMETERS:  The patient underwent a formal technically adequate unattended diagnostic sleep study [facility] The study was performed in accordance with the AASM standard for Out of Center Sleep Testing. The four-channel Type III HST measures the following parameters:  flow, respiratory effort, pulse, and oxygen saturation. SCORING:  This study was scored in accordance with AASM scoring rules and Medicare rule 1B. FINDINGS:  Total recording time of the study was 7 hours and 26 minutes. Total recording time of flow was 7 hours and 14 minutes. Oxygen saturation evaluation was 7 hours and 7 minutes. During this time, patient had no apneas and 31 hypopneas for an apnea-hypopnea index of 4.3, a supine AHI of 7.4. Her oxygen saturation at baseline was 93%. The average oxygenation was 92%. The lowest oxygen saturation was 86. The patient spent 20% of her time less than 90% and 5 minutes less than 88%. CARDIAC DATA:  Minimum pulse was 44, average pulse 61, maximum pulse 84. ASSESSMENT:    1. Technically normal home sleep study. 2.   Nocturnal hypoxemia. RECOMMENDATIONS:    1. Recommend in-lab polysomnogram.    2.   Advised patient not to drive while sleepy. 3.   Evaluate for other cardiac risk factors. 4.   Obtain and maintain an optimal BMI. Dictated By Kathy Knowles MD  d: 02/06/2023 19:03:49  t: 02/06/2023 19:49:03  [facility] [id number]  T/    cc: Moni Pringle DO

## 2023-02-09 ENCOUNTER — TELEPHONE (OUTPATIENT)
Dept: INTERNAL MEDICINE CLINIC | Facility: CLINIC | Age: 53
End: 2023-02-09

## 2023-02-09 ENCOUNTER — TELEPHONE (OUTPATIENT)
Dept: SLEEP CENTER | Age: 53
End: 2023-02-09

## 2023-02-09 DIAGNOSIS — G47.34 NOCTURNAL HYPOXEMIA: Primary | ICD-10-CM

## 2023-02-09 NOTE — TELEPHONE ENCOUNTER
Diagnostic sleep study order placed.  Referral # sent to Shannan Day, referral specialist to assist with approval.

## 2023-02-09 NOTE — TELEPHONE ENCOUNTER
Pt is requesting order for a follow up sleep study. Sleep study location is saying no order is in yet.     Please advise when the order is in so the patient can schedule    Thank you

## 2023-02-11 DIAGNOSIS — I10 ESSENTIAL HYPERTENSION, BENIGN: ICD-10-CM

## 2023-02-11 RX ORDER — LOSARTAN POTASSIUM 50 MG/1
TABLET ORAL
Qty: 90 TABLET | Refills: 0 | Status: SHIPPED | OUTPATIENT
Start: 2023-02-11

## 2023-02-13 ENCOUNTER — TELEPHONE (OUTPATIENT)
Dept: FAMILY MEDICINE CLINIC | Facility: CLINIC | Age: 53
End: 2023-02-13

## 2023-02-13 NOTE — TELEPHONE ENCOUNTER
Home sleep study from 2/2/23 has been read. Please scheduled for in lab PSG. Future Appointments   Date Time Provider Andres Hernández   2/17/2023  9:15 PM [facility] SLEEP ROOMS Deisy Solo   2/22/2023  3:15 PM [facility] CARD TM/STRESS/ECHO RM 1 [facility] CARD Shabana Love     Updated flowsheet.

## 2023-02-17 ENCOUNTER — OFFICE VISIT (OUTPATIENT)
Dept: SLEEP CENTER | Age: 53
End: 2023-02-17
Attending: FAMILY MEDICINE
Payer: COMMERCIAL

## 2023-02-17 DIAGNOSIS — G47.34 NOCTURNAL HYPOXEMIA: ICD-10-CM

## 2023-02-17 PROCEDURE — 95810 POLYSOM 6/> YRS 4/> PARAM: CPT

## 2023-02-20 ENCOUNTER — SLEEP STUDY (OUTPATIENT)
Dept: FAMILY MEDICINE CLINIC | Facility: CLINIC | Age: 53
End: 2023-02-20
Payer: COMMERCIAL

## 2023-02-20 DIAGNOSIS — G47.33 OSA (OBSTRUCTIVE SLEEP APNEA): Primary | ICD-10-CM

## 2023-02-20 PROCEDURE — 95810 POLYSOM 6/> YRS 4/> PARAM: CPT | Performed by: FAMILY MEDICINE

## 2023-02-20 NOTE — PROGRESS NOTES
Please notify patient sleep study is read. Update flow sheet   Schedule in office follow up. Sleep hygiene should be review to assess factors that may improve sleep quality. Weight management and regular exercise should be initiated or continued to optimal BMI.

## 2023-02-21 ENCOUNTER — TELEPHONE (OUTPATIENT)
Dept: FAMILY MEDICINE CLINIC | Facility: CLINIC | Age: 53
End: 2023-02-21

## 2023-02-23 NOTE — TELEPHONE ENCOUNTER
Future Appointments   Date Time Provider Andres Hernández   2/27/2023  5:50 PM Dione Enriquez MD [facility]

## 2023-02-27 ENCOUNTER — OFFICE VISIT (OUTPATIENT)
Dept: FAMILY MEDICINE CLINIC | Facility: CLINIC | Age: 53
End: 2023-02-27
Payer: COMMERCIAL

## 2023-02-27 VITALS
RESPIRATION RATE: 16 BRPM | BODY MASS INDEX: 30.62 KG/M2 | OXYGEN SATURATION: 96 % | SYSTOLIC BLOOD PRESSURE: 116 MMHG | HEIGHT: 65 IN | HEART RATE: 89 BPM | WEIGHT: 183.81 LBS | DIASTOLIC BLOOD PRESSURE: 74 MMHG | TEMPERATURE: 98 F

## 2023-02-27 DIAGNOSIS — G25.81 RLS (RESTLESS LEGS SYNDROME): ICD-10-CM

## 2023-02-27 DIAGNOSIS — G47.33 OSA (OBSTRUCTIVE SLEEP APNEA): Primary | ICD-10-CM

## 2023-02-27 DIAGNOSIS — R53.83 FATIGUE, UNSPECIFIED TYPE: ICD-10-CM

## 2023-02-27 RX ORDER — IBUPROFEN 800 MG/1
800 TABLET ORAL EVERY 6 HOURS PRN
COMMUNITY
Start: 2023-01-25

## 2023-02-28 ENCOUNTER — PATIENT MESSAGE (OUTPATIENT)
Dept: FAMILY MEDICINE CLINIC | Facility: CLINIC | Age: 53
End: 2023-02-28

## 2023-02-28 NOTE — TELEPHONE ENCOUNTER
From: Eric Mahoney  To: Ashok Butler MD  Sent: 2/28/2023 8:07 AM CST  Subject: Vitamin D    Dear Dr. Iván Carpenter,    Thank you for your time yesterday and all the information you shared with me. I went to the [employer] website to order the Vitamin D. The capsule they sell is 5000IU and you recommended I take 2000. Is it ok if I order and take the capsule from the website?      Kimberly Yusuf

## 2023-02-28 NOTE — PATIENT INSTRUCTIONS
Vitamin D is sub therapeutic. Recommend 2000 units of vitamin D daily, increase if already taking. Goal of 49 for vitamin D   Recheck vitamin D level in 6  months. [employer]. net  (0528 access number) for vitamins        Please be advised:   Do not drive while sleepy   Take CPAP/BiPAP machine to any procedure that requires sedation     When should I clean my machine & supplies? Clean mask cushions or nasal pillow, headgear, tubing, and humidifier chamber with mild soap (Starr) and water   If water chamber has hard water buildup (white crust), soak in warm water & vinegar mix 50/50. Rinse and hang dry     DAILY   Wipe mask cushions or nasal pillow   Empty & rinse water chamber- refill with distilled water     WEEKLY   Clean mask cushions or nasal pillow, headgear, tubing, and humidifier chamber with mild soap (Starr) and water   If water chamber has hard water buildup (white crust), soak in warm water & vinegar mix 50/50. Rinse and hang dry     When should supplies be replaced? Contact your home care company for replacement supplies, or if your machine is malfunctioning   *Below is a general guideline of what we recommend. Replacement of supplies differs depending on your insurance company*   MONTHLY: Replace filter and mask cushion   6 MONTHS: Replace headgear and tubing     Travel Tips   Keep CPAP/BiPAP in original bag when traveling, and place luggage tag on bag   Most airlines consider CPAP/BiPAP to be a medical device, therefore it is a free carry-on item   If unable to get distilled water, bottled water is safe while traveling.  DO NOT use tap water   When traveling outside the [place] only a power adapter is necessary (CPAP can operate without a converter), bring an extension cord   Consider purchasing or renting a travel CPAP (not covered by insurance)     Dry Mouth/Nose   Turn up the humidity on your machine (select \"Options\" from the home screen)   Place a cool mist humidifier at your bedside Over-the-counter remedies: Biotene, XyliMelts, NasoGel     Air Leak   Try adjusting your mask/headgear while laying in sleeping position vs. sitting up   Wash and dry your face prior to putting your mask on   If applicable: shave facial hair at night (or before wearing CPAP)   Purchase \"RemZzzs\" (through [employer], or rem[employer]. com)   100% cotton knit barrier that goes between your mask cushion and your skin   Replace your mask cushion (at least once per month) and/or headgear (every 3-6 months)     Nasal Congestion   CPAP therapy can cause nasal passages to dry out, & mucous membranes try to protect the nasal passages by producing excess mucous, so congestion results. Over-the counter remedies: Flonase, Nasacort, Sinus Rinses (Neti-Pot), DO NOT USE Afrin   Try a mask that goes over the nose and mouth     Skin Irritation   Clean supplies regularly (Citrus II Mask Wipes, Control III disinfectant solution)   Try over the counter creams such as hydrocortisone 1% (apply in the morning after showering)   Your headgear may be too tight, replace supplies so you don't need to adjust so tightly   Try RemZzzs (100% cotton knit barrier that goes between your mask cushion and your skin)     Gas/Bloating   Try a different sleeping position to keep air out of the stomach. Lay on the left side or rotate to the right side. Incline with pillows or lay flat.    Over-the-counter remedies: Simethicone

## 2023-03-06 ENCOUNTER — LABORATORY ENCOUNTER (OUTPATIENT)
Dept: LAB | Age: 53
End: 2023-03-06
Attending: FAMILY MEDICINE
Payer: COMMERCIAL

## 2023-03-06 DIAGNOSIS — G25.81 RLS (RESTLESS LEGS SYNDROME): ICD-10-CM

## 2023-03-06 DIAGNOSIS — R53.83 FATIGUE, UNSPECIFIED TYPE: ICD-10-CM

## 2023-03-06 DIAGNOSIS — G47.33 OSA (OBSTRUCTIVE SLEEP APNEA): ICD-10-CM

## 2023-03-06 LAB
DEPRECATED HBV CORE AB SER IA-ACNC: 156.1 NG/ML
IRON SATN MFR SERPL: 18 %
IRON SERPL-MCNC: 67 UG/DL
TIBC SERPL-MCNC: 380 UG/DL (ref 240–450)
TRANSFERRIN SERPL-MCNC: 255 MG/DL (ref 200–360)
VIT B12 SERPL-MCNC: 474 PG/ML (ref 193–986)

## 2023-03-06 PROCEDURE — 82728 ASSAY OF FERRITIN: CPT

## 2023-03-06 PROCEDURE — 82607 VITAMIN B-12: CPT

## 2023-03-06 PROCEDURE — 83540 ASSAY OF IRON: CPT

## 2023-03-06 PROCEDURE — 83550 IRON BINDING TEST: CPT

## 2023-03-06 PROCEDURE — 36415 COLL VENOUS BLD VENIPUNCTURE: CPT

## 2023-03-10 ENCOUNTER — HOSPITAL ENCOUNTER (OUTPATIENT)
Dept: CV DIAGNOSTICS | Age: 53
Discharge: HOME | End: 2023-03-10
Attending: NURSE PRACTITIONER
Payer: COMMERCIAL

## 2023-03-10 DIAGNOSIS — G47.34 NOCTURNAL HYPOXEMIA: ICD-10-CM

## 2023-03-10 PROCEDURE — 93306 TTE W/DOPPLER COMPLETE: CPT | Performed by: NURSE PRACTITIONER

## 2023-03-20 DIAGNOSIS — F41.9 ANXIETY: ICD-10-CM

## 2023-03-21 RX ORDER — ALPRAZOLAM 0.25 MG/1
TABLET ORAL
Qty: 30 TABLET | Refills: 0 | Status: SHIPPED | OUTPATIENT
Start: 2023-03-21

## 2023-03-21 NOTE — TELEPHONE ENCOUNTER
Last time medication was refilled 5/19/22  Quantity and number of refills 30 w/ 0  Last OV 1/16/23  Next OV 1/2024

## 2023-05-23 ENCOUNTER — PATIENT MESSAGE (OUTPATIENT)
Dept: FAMILY MEDICINE CLINIC | Facility: CLINIC | Age: 53
End: 2023-05-23

## 2023-05-23 NOTE — TELEPHONE ENCOUNTER
From: Dyllan Shah  To: Renetta Lopez MD  Sent: 5/23/2023 2:42 PM CDT  Subject: Vitamins    Dear Dr. Brenda Taylor,    Would you like for me to continue taking the vitamins you suggested until my follow-up appointment in July? I just received an email that my subscription to [employer] will arrive June 6th so I wanted to check in with you.     Alethea Aj

## 2023-06-16 DIAGNOSIS — I10 ESSENTIAL HYPERTENSION, BENIGN: ICD-10-CM

## 2023-06-16 RX ORDER — LOSARTAN POTASSIUM 50 MG/1
TABLET ORAL
Qty: 90 TABLET | Refills: 0 | Status: SHIPPED | OUTPATIENT
Start: 2023-06-16

## 2023-06-22 DIAGNOSIS — R09.82 POSTNASAL DRIP: ICD-10-CM

## 2023-06-23 RX ORDER — AZELASTINE 1 MG/ML
SPRAY, METERED NASAL
Qty: 30 ML | Refills: 0 | Status: SHIPPED | OUTPATIENT
Start: 2023-06-23

## 2023-06-23 NOTE — TELEPHONE ENCOUNTER
Passed protocol, Rx sent. Last time medication was refilled 4/12/22  Quantity and # of refills 30 mL  Last OV 1/16/23  Next OV No future appts scheduled. Spoke with pt, she accidentally reordered Ipratropium 0.06% nasal solution. She is not using that medication. Okay to discontinue.  Rx sent for Azelastine/

## 2023-06-28 ENCOUNTER — OFFICE VISIT (OUTPATIENT)
Dept: PODIATRY CLINIC | Facility: CLINIC | Age: 53
End: 2023-06-28

## 2023-06-28 ENCOUNTER — TELEPHONE (OUTPATIENT)
Dept: PODIATRY CLINIC | Facility: CLINIC | Age: 53
End: 2023-06-28

## 2023-06-28 DIAGNOSIS — M72.2 PLANTAR FASCIITIS, RIGHT: Primary | ICD-10-CM

## 2023-06-28 DIAGNOSIS — M79.671 RIGHT FOOT PAIN: ICD-10-CM

## 2023-06-28 PROCEDURE — 99213 OFFICE O/P EST LOW 20 MIN: CPT | Performed by: STUDENT IN AN ORGANIZED HEALTH CARE EDUCATION/TRAINING PROGRAM

## 2023-07-06 ENCOUNTER — TELEPHONE (OUTPATIENT)
Dept: PODIATRY CLINIC | Facility: CLINIC | Age: 53
End: 2023-07-06

## 2023-07-06 NOTE — TELEPHONE ENCOUNTER
Per Eric Downey is seeing pt for PT, aware pt has surgery scheduled for next week, asking if pt should resume PT after surgery and how soon? Please advise thank you.

## 2023-07-07 NOTE — TELEPHONE ENCOUNTER
Would like to hold off for at least 3-4 weeks after surgery to resume PT. May not be needed after surgery depending on how recovery goes.

## 2023-07-11 ENCOUNTER — PROCEDURE (OUTPATIENT)
Dept: PODIATRY CLINIC | Facility: CLINIC | Age: 53
End: 2023-07-11

## 2023-07-11 ENCOUNTER — TELEPHONE (OUTPATIENT)
Dept: WOUND CARE | Facility: HOSPITAL | Age: 53
End: 2023-07-11

## 2023-07-11 DIAGNOSIS — Z47.89 ORTHOPEDIC AFTERCARE: Primary | ICD-10-CM

## 2023-07-11 RX ORDER — OXYCODONE HYDROCHLORIDE AND ACETAMINOPHEN 5; 325 MG/1; MG/1
1 TABLET ORAL EVERY 4 HOURS PRN
Qty: 10 TABLET | Refills: 0 | Status: SHIPPED | OUTPATIENT
Start: 2023-07-11

## 2023-07-12 ENCOUNTER — TELEPHONE (OUTPATIENT)
Dept: PODIATRY CLINIC | Facility: CLINIC | Age: 53
End: 2023-07-12

## 2023-07-12 NOTE — PROCEDURES
OPERATIVE REPORT     [address] Patient Status:  No patient class for patient encounter    1970 MRN [id number]   Location [facility], Liliya Yuanau, [address] [facility] Attending No att. providers found   [address] Day # 0 PCP Abril Ku MD     Date of Surgery:  23     Preoperative Diagnosis: Chronic plantar fasciitis, right foot     Postoperative Diagnosis: same     Primary Surgeon: Srinath Little DPM     Assistant: Juanjose Toth PGY1     Procedures: Endoscopic plantar fasciotomy, right foot     Surgical Findings: Consistent with preop diagnosis     Anesthesia: MAC     Complications: none     Implants: None     Specimen: None     Drains: None     Condition: Vital signs stable CFT intact to digits     Estimated Blood Loss: None     Preoperative history/indications:     Patient is a pleasant 58-year-old female who has been followed in clinic for chronic right foot plantar fasciitis. She has had on and off symptoms to site for the last 1+ year. She has had multiple cortisone injections and uses supportive shoes and inserts and performed stretching exercises/physical therapy as well. Her symptoms continue to return. She has been scheduled for endoscopic plantar fasciotomy at this time as a result. Informed Consent: All treatment options have been discussed with the patient including both conservative and surgical attempts at correction. Potential risks and complications of surgical intervention were discussed at length including but not limited to death, loss of limb, post op pain, swelling, infection, bleeding, reoccurrence, extended healing,  and the possibility of further and future surgery. No guarantees have been made to the patient and the informed consent has been signed. Procedure in detail:  The patient was brought to the operating room placed in the operating table in the supine position.   A timeout was called the presence of the anesthesiologist, surgical technician, circulating nurse, and myself to confirm the correct patient's name, date of birth, procedure, and location of the procedure to be performed. All present were in agreement. After IV sedation was initiated, local infiltrative block was administered to the right foot consisting of 20 cc of a one-to-one mixture of 1% lidocaine plain and 0.5% Marcaine plain. A well-padded pneumatic ankle tourniquet was then applied to the right ankle. The right foot was then scrubbed, prepped, and draped in the usual aseptic fashion. The right  lower extremity was then exsanguinated with an Esmarch bandage and the tourniquet was inflated to 250 mmHg where it remained for the duration of the procedure. The medial calcaneal tubercle of the right foot was palpated and an incision was planned approximately 2 cm superior to the plantar aspect of the calcaneus and 5 cm distal to the posterior aspect of the calcaneus. The planned incision was approximately 1 cm in length along the medial aspect of the right plantar heel. The incision was made with a 15 blade in transverse fashion over the previously marked out site. Upon making the incision, a hemostat was used to bluntly dissected the subcutaneous tissue, creating a medial portal.  Next the plantar fascia was identified and strummed with a curved hemostat noting the superior and inferior aspects of the plantar fascial ligament. Next, the [employer] fascial elevator instrument was inserted to the medial incision site and the medial aspect of the plantar fascia was further identified and palpated in the plane inferior to the plantar fascia was further dissected free. The fascial elevator was then advanced laterally across the inferior aspect of the plantar fascia and then removed. Next the Kimberly obturator and cannula was inserted to the medial portal advanced laterally. Once tenting was noted at the lateral skin 1 cm incision was made horizontally with 15 blade. After making the lateral portal the obturator was removed and a 4 mm 30 degree scope was introduced through the lateral portal inside the cannula. The plantar fascial was inspected at this time. The scope was then removed and the site was cleansed with multiple cotton-tipped applicators. Next the 4 mm scope was reinserted and a hook blade was introduced to the medial portal.  Resection of the medial one half of the plantar fascia was planned at this time. Next, using the scope was guidance, the medial one half of the plantar fascial band was cut with the hook blade. The foot was dorsiflexed while cutting the fascia, allowing easier completion of the cut. Care was taken to cut only the plantar fascia at this time, while preserving the underlying flexor digitorum brevis muscle belly. At this time the hook blade was removed and the site was flushed with sterile saline. At this time the scope and cannula were both removed. The incision sites were then reapproximated with 3-0 nylon suture in horizontal mattress fashion at this time. 4mg of dexamethasone were administered to the surgical site. An additional 4 cc 1% lidocaine plain were administered around the incisions. The incisions were covered with betadine soaked adaptic, gauze, webril, Kerlix, and a mildly compressive Ace wrap. The tourniquet was deflated and prompt hyperemic response was noted to digits 1 through 5 of the right foot. The patient was transferred from the operating room to the recovery room with vital signs stable and neurovascular status intact. I will follow up with the patient and the clinic for all postoperative care.       Maury Villalobos DPM, 07/11/23

## 2023-07-18 ENCOUNTER — OFFICE VISIT (OUTPATIENT)
Dept: PODIATRY CLINIC | Facility: CLINIC | Age: 53
End: 2023-07-18

## 2023-07-18 DIAGNOSIS — M79.671 RIGHT FOOT PAIN: ICD-10-CM

## 2023-07-18 DIAGNOSIS — M72.2 PLANTAR FASCIITIS, RIGHT: Primary | ICD-10-CM

## 2023-07-18 DIAGNOSIS — Z47.89 ORTHOPEDIC AFTERCARE: ICD-10-CM

## 2023-07-18 PROCEDURE — 99024 POSTOP FOLLOW-UP VISIT: CPT | Performed by: STUDENT IN AN ORGANIZED HEALTH CARE EDUCATION/TRAINING PROGRAM

## 2023-07-22 NOTE — PATIENT INSTRUCTIONS
Ambulate with postop shoe or stiff soled tennis shoe to right foot. Slowly increase activity as tolerated. Follow-up when back from trip.

## 2023-07-22 NOTE — PROGRESS NOTES
[facility], [facility]  Progress Note    Alesia Day is a 52year old female. Patient presents with:  Post-Op: F/u post op sx  right foot plantar fasciitis. Pain 2/10, no numbness or tingling sensation. HPI:     Patient is a pleasant 47-year-old female presents to clinic with  for postop visit status post right foot endoscopic plantar fasciotomy (DOS: 7/11/23). She has dressings intact and has minimal pain at this time. She has minor pain at her incision sites that she rates at a 2 out of 10. She has been taking ibuprofen as needed. No other complaints or mention. She denies nausea, vomiting, fever, chills. Allergies: Dust Mites   Current Outpatient Medications   Medication Sig Dispense Refill    AZELASTINE 0.1 % Nasal Solution USE 1 SPRAY IN EACH NOSTRIL TWICE DAILY 30 mL 0    LOSARTAN 50 MG Oral Tab TAKE 1 TABLET(50 MG) BY MOUTH DAILY 90 tablet 0    ibuprofen 800 MG Oral Tab Take 1 tablet (800 mg total) by mouth every 6 (six) hours as needed. montelukast (SINGULAIR) 10 MG Oral Tab Take 1 tablet (10 mg total) by mouth daily. 90 tablet 3    Calcium Polycarbophil (FIBER) 625 MG Oral Tab Take 1 tablet (625 mg total) by mouth daily as needed. Fluticasone Propionate 50 MCG/ACT Nasal Suspension 1 spray by Nasal route daily. loratadine 10 MG Oral Tab Take 1 tablet (10 mg total) by mouth daily. oxyCODONE-acetaminophen (PERCOCET) 5-325 MG Oral Tab Take 1 tablet by mouth every 4 (four) hours as needed for Pain.  10 tablet 0    ALPRAZOLAM 0.25 MG Oral Tab TAKE 1 TABLET(0.25 MG) BY MOUTH EVERY 6 HOURS AS NEEDED 30 tablet 0      Past Medical History:   Diagnosis Date    Abdominal pain, left lower quadrant     Abnormal uterine bleeding     spotting for 3 days    Acute diverticulitis 5/4/2021    Acute pharyngitis     Allergic rhinitis, cause unspecified     Amenorrhea     Mirena    Anxiety state, unspecified     Asthma, mild intermittent     Diverticulosis     Esophageal reflux JARED (generalized anxiety disorder)     GERD (gastroesophageal reflux disease)     H/O mammogram 2014    benign    Headache(784.0)     High blood pressure     Other abnormal blood chemistry     Other atopic dermatitis and related conditions     Other malaise and fatigue     Pap smear for cervical cancer screening 1-    wnl pt stated    PONV (postoperative nausea and vomiting)     Unspecified constipation     Unspecified essential hypertension     Uterine fibroid     Visual impairment     glasses/contacts      Past Surgical History:   Procedure Laterality Date    CHOLECYSTECTOMY  1998    COLECTOMY  2021    Dr. Jolanta Palacios, POSSIBLE TRANSVERSE COLON SEGMENTAL    COLONOSCOPY      COLONOSCOPY  2017    hp polyp, diverticulosis. repeat 10 year    COLONOSCOPY N/A 2017    Procedure: COLONOSCOPY, POSSIBLE BIOPSY, POSSIBLE POLYPECTOMY 43378;  Surgeon: Devin Self MD;  Location: [facility]    HYSTERECTOMY  2015    total hysterectomy due to myomas. intact ovaries.     MIRENA, IUD  2013    OTHER SURGICAL HISTORY      elective     OTHER SURGICAL HISTORY  2021    Cysto (Dr. Brandy Michel)    [place]    TVT taping of cystocele    UPPER ARM/ELBOW SURGERY UNLISTED      repair fx of LUE      Family History   Problem Relation Age of Onset    Breast Cancer Paternal Grandmother 54    Hypertension Mother     Alcohol and Other Disorders Associated Mother         Cirrhosis    Other (eczema) Mother     Cancer Father         melanoma    Lipids Father     Other (hyperlipidemia) Father     Asthma Other         children    Heart Disease Paternal Grandfather     Breast Cancer Paternal Aunt       Social History    Socioeconomic History      Marital status:     Tobacco Use      Smoking status: Never      Smokeless tobacco: Never    Vaping Use      Vaping Use: Never used    Substance and Sexual Activity Alcohol use: Yes        Comment: twice per month      Drug use: No      Sexual activity: Yes        Partners: Male    Other Topics      Concerns:        Caffeine Concern: Yes          2 cups daily        Exercise: Yes          7 x a week. walk          REVIEW OF SYSTEMS:     Today reviewed systems as documented below  GENERAL HEALTH: feels well otherwise  SKIN: denies any unusual skin lesions or rashes  RESPIRATORY: denies shortness of breath with exertion  CARDIOVASCULAR: denies chest pain on exertion  GI: denies abdominal pain and denies heartburn  NEURO: denies headaches  MUSCULO: denies arthritis, back pain      EXAM:   LMP 03/16/2015   GENERAL: well developed, well nourished, in no apparent distress  EXTREMITIES:   1. Integument: Normal skin temperature and turgor. Incision sites are well coapted with sutures intact. No dehiscence or other concerns. 2. Vascular: Dorsalis pedis two out of four bilateral and posterior tibial pulses two out of   four bilateral, capillary refill normal.   3. Musculoskeletal: All muscle groups are graded 5 out of 5 in the foot and ankle. Minor pain noted incision sites. No pain noted plantar heel. 4. Neurological: Normal sharp dull sensation; reflexes normal.          ASSESSMENT AND PLAN:   Diagnoses and all orders for this visit:    Plantar fasciitis, right    Right foot pain    Orthopedic aftercare        Plan:     -status post right foot endoscopic plantar fasciotomy (DOS: 7/11/23). -Site inspected--noted to be healing well without concerns at this time.  -Site redressed with Betadine and Band-Aids. Can continue ambulate with postop shoe or supportive tennis shoe.    -Can take ibuprofen as needed for pain.  -Educated patient on acute signs of infection/symptoms of DVT advised patient seek any medical judgment concerns arise. The patient indicates understanding of these issues and agrees to the plan. Return in about 2 weeks (around 8/1/2023) for Postop.     Ranjeet Minor Block, DPM

## 2023-07-24 ENCOUNTER — OFFICE VISIT (OUTPATIENT)
Dept: FAMILY MEDICINE CLINIC | Facility: CLINIC | Age: 53
End: 2023-07-24
Payer: COMMERCIAL

## 2023-07-24 VITALS
HEIGHT: 65 IN | TEMPERATURE: 98 F | RESPIRATION RATE: 16 BRPM | BODY MASS INDEX: 30.55 KG/M2 | SYSTOLIC BLOOD PRESSURE: 128 MMHG | DIASTOLIC BLOOD PRESSURE: 82 MMHG | HEART RATE: 80 BPM | OXYGEN SATURATION: 95 % | WEIGHT: 183.38 LBS

## 2023-07-24 DIAGNOSIS — G25.81 RLS (RESTLESS LEGS SYNDROME): ICD-10-CM

## 2023-07-24 DIAGNOSIS — E55.9 VITAMIN D DEFICIENCY: ICD-10-CM

## 2023-07-24 DIAGNOSIS — G47.33 OSA (OBSTRUCTIVE SLEEP APNEA): Primary | ICD-10-CM

## 2023-07-24 LAB
DEPRECATED HBV CORE AB SER IA-ACNC: 239.3 NG/ML
IRON SATN MFR SERPL: 17 %
IRON SERPL-MCNC: 63 UG/DL
TIBC SERPL-MCNC: 371 UG/DL (ref 240–450)
TRANSFERRIN SERPL-MCNC: 249 MG/DL (ref 200–360)
VIT B12 SERPL-MCNC: 799 PG/ML (ref 193–986)

## 2023-07-24 PROCEDURE — 3079F DIAST BP 80-89 MM HG: CPT | Performed by: FAMILY MEDICINE

## 2023-07-24 PROCEDURE — 99214 OFFICE O/P EST MOD 30 MIN: CPT | Performed by: FAMILY MEDICINE

## 2023-07-24 PROCEDURE — 82607 VITAMIN B-12: CPT | Performed by: FAMILY MEDICINE

## 2023-07-24 PROCEDURE — 3008F BODY MASS INDEX DOCD: CPT | Performed by: FAMILY MEDICINE

## 2023-07-24 PROCEDURE — 82728 ASSAY OF FERRITIN: CPT | Performed by: FAMILY MEDICINE

## 2023-07-24 PROCEDURE — 82306 VITAMIN D 25 HYDROXY: CPT | Performed by: FAMILY MEDICINE

## 2023-07-24 PROCEDURE — 3074F SYST BP LT 130 MM HG: CPT | Performed by: FAMILY MEDICINE

## 2023-07-24 PROCEDURE — 83550 IRON BINDING TEST: CPT | Performed by: FAMILY MEDICINE

## 2023-07-24 PROCEDURE — 83540 ASSAY OF IRON: CPT | Performed by: FAMILY MEDICINE

## 2023-07-24 RX ORDER — MELATONIN
325
COMMUNITY

## 2023-07-24 RX ORDER — MELATONIN
1000 DAILY
COMMUNITY

## 2023-07-24 RX ORDER — MULTIVIT-MIN/IRON/FOLIC ACID/K 18-600-40
CAPSULE ORAL
COMMUNITY

## 2023-07-24 NOTE — PATIENT INSTRUCTIONS
How to avoid insomnia  Wake up at the same time each day. Maintaining a regular sleep schedule is important to good sleep. Eliminate alcohol and stimulants like nicotine and caffeine. The effects of caffeine can last for several hours, perhaps up to 24 hours, so the chances of it affecting sleep are significant. Caffeine may not only cause difficulty initiating sleep, but may also cause frequent awakenings. Alcohol may have a sedative effect for the first few hours following consumption, but it can then lead to frequent arousals and a non-restful night's sleep. Medications that act as stimulants, such as decongestants  can also disrupt your sleep and should be avoided. Use of technology such as computers, media and smart phones prior to bedtime should be avoided. Do not look at the clock. Clock watching has been associated with increased anxiety during sleep and worsening of insomnia. The bright lights from modern digital clocks have been associated with poor sleep. Clocks should be turned away from you and if the light is too bright, they should be covered. Eliminate naps. While napping seems like a proper way to catch up on missed sleep, it is not always so. It is important to establish and maintain a regular sleep pattern and train oneself to associate sleep with cues like darkness and a consistent bedtime. Napping can affect the quality of nighttime sleep. Exercise regularly. Regular exercise can improve sleep quality and duration. However, exercising immediately before bedtime can have a stimulant effect on the body and should be avoided. Try to finish exercising at least three hours before you plan to retire for the night. Limit activities in bed. The bed is for sleeping and having sex and that's it. Do not use the computer or smart phone, watch TV, catch up on work or listen to music while in bed. All these activities can increase alertness and make it difficult to fall asleep.   Do not eat or drink right before going to bed. Eating a late dinner or snacking before going to bed can activate the digestive system and keep you up. Drinking a lot of fluids prior to bed can overwhelm the bladder, requiring frequent visits to the bathroom that disturb your sleep. Make your sleeping environment comfortable. Temperature, lighting, and noise should be controlled to make the bedroom conducive to falling (and staying) asleep. Ideally your bedroom temperature should be 66-68 degrees. Rooms that are warm will make it more difficult to fall asleep. Do not allow your pet to sleep on your bed or in the bedroom. If your bed partner is disruptive due to snoring or constant movement, consider sleeping alone in a separate bedroom. Get all your worrying over with before you go to bed. If you find you lay in bed thinking about tomorrow, consider setting aside a period of time -- perhaps after dinner -- to review the day and to make plans for the next day. The goal is to avoid doing these things while trying to fall asleep. It is also useful to make a list of, say, work-related tasks for the next day before leaving work. That, at least, eliminates one set of concerns. Reduce stress. There are a number of relaxation therapies and stress reduction methods you may want to try to relax the mind and the body before going to bed. Examples include progressive muscle relaxation (perhaps with audio tapes), deep breathing techniques, imagery, and meditation.

## 2023-07-25 ENCOUNTER — HOSPITAL ENCOUNTER (OUTPATIENT)
Dept: ULTRASOUND IMAGING | Facility: HOSPITAL | Age: 53
Discharge: HOME | End: 2023-07-25
Attending: STUDENT IN AN ORGANIZED HEALTH CARE EDUCATION/TRAINING PROGRAM
Payer: COMMERCIAL

## 2023-07-25 ENCOUNTER — OFFICE VISIT (OUTPATIENT)
Dept: PODIATRY CLINIC | Facility: CLINIC | Age: 53
End: 2023-07-25

## 2023-07-25 DIAGNOSIS — M79.671 RIGHT FOOT PAIN: ICD-10-CM

## 2023-07-25 DIAGNOSIS — Z47.89 ORTHOPEDIC AFTERCARE: ICD-10-CM

## 2023-07-25 DIAGNOSIS — M79.661 RIGHT CALF PAIN: ICD-10-CM

## 2023-07-25 DIAGNOSIS — M72.2 PLANTAR FASCIITIS, RIGHT: Primary | ICD-10-CM

## 2023-07-25 DIAGNOSIS — M72.2 PLANTAR FASCIITIS, RIGHT: ICD-10-CM

## 2023-07-25 LAB — VIT D+METAB SERPL-MCNC: 31.3 NG/ML (ref 30–100)

## 2023-07-25 PROCEDURE — 93971 EXTREMITY STUDY: CPT | Performed by: STUDENT IN AN ORGANIZED HEALTH CARE EDUCATION/TRAINING PROGRAM

## 2023-07-25 PROCEDURE — 99024 POSTOP FOLLOW-UP VISIT: CPT | Performed by: STUDENT IN AN ORGANIZED HEALTH CARE EDUCATION/TRAINING PROGRAM

## 2023-07-25 RX ORDER — METHYLPREDNISOLONE 4 MG/1
TABLET ORAL
Qty: 21 TABLET | Refills: 0 | Status: SHIPPED | OUTPATIENT
Start: 2023-07-25

## 2023-07-25 NOTE — PATIENT INSTRUCTIONS
Ambulate with supportive shoes and avoid walking barefoot. Can take Medrol Dosepak on trip if needed. Complete ultrasound of right calf prior to trip.

## 2023-07-25 NOTE — PROGRESS NOTES
[facility], [facility]  Progress Note    Rajiv Mayberry is a 52year old female. Patient presents with:  Post-Op: Right foot surgery, 3/10 pain scale        HPI:     Patient is a pleasant 49-year-old female presents to clinic with  for postop visit status post right foot endoscopic plantar fasciotomy (DOS: 7/11/23). She has dressings intact and has minimal pain at this time. She has minor pain at her incision sites that she rates at a 3 she has returned to normal shoe gear. She does have some mild calf tightness. Out of 10. She has been taking ibuprofen as needed. No other complaints are mentioned. She denies nausea, vomiting, fever, chills. Allergies: Dust Mites   Current Outpatient Medications   Medication Sig Dispense Refill    cyanocobalamin 1000 MCG Oral Tab Take 1 tablet (1,000 mcg total) by mouth daily. Cholecalciferol (VITAMIN D) 50 MCG (2000 UT) Oral Cap Take by mouth. ferrous sulfate 325 (65 FE) MG Oral Tab EC Take 1 tablet (325 mg total) by mouth daily with breakfast.      LOSARTAN 50 MG Oral Tab TAKE 1 TABLET(50 MG) BY MOUTH DAILY 90 tablet 0    ibuprofen 800 MG Oral Tab Take 1 tablet (800 mg total) by mouth every 6 (six) hours as needed. montelukast (SINGULAIR) 10 MG Oral Tab Take 1 tablet (10 mg total) by mouth daily. 90 tablet 3    Calcium Polycarbophil (FIBER) 625 MG Oral Tab Take 1 tablet (625 mg total) by mouth daily as needed. Fluticasone Propionate 50 MCG/ACT Nasal Suspension 1 spray by Nasal route daily. loratadine 10 MG Oral Tab Take 1 tablet (10 mg total) by mouth daily.         Past Medical History:   Diagnosis Date    Abdominal pain, left lower quadrant     Abnormal uterine bleeding     spotting for 3 days    Acute diverticulitis 5/4/2021    Acute pharyngitis     Allergic rhinitis, cause unspecified     Amenorrhea     Mirena    Anxiety state, unspecified     Asthma, mild intermittent     Diverticulosis     Esophageal reflux     JARED (generalized anxiety disorder)     GERD (gastroesophageal reflux disease)     H/O mammogram 2014    benign    Headache(784.0)     High blood pressure     Other abnormal blood chemistry     Other atopic dermatitis and related conditions     Other malaise and fatigue     Pap smear for cervical cancer screening 1-    wnl pt stated    PONV (postoperative nausea and vomiting)     Unspecified constipation     Unspecified essential hypertension     Uterine fibroid     Visual impairment     glasses/contacts      Past Surgical History:   Procedure Laterality Date    CHOLECYSTECTOMY  1998    COLECTOMY  2021    Dr. Michael Layton, POSSIBLE TRANSVERSE COLON SEGMENTAL    COLONOSCOPY      COLONOSCOPY  2017    hp polyp, diverticulosis. repeat 10 year    COLONOSCOPY N/A 2017    Procedure: COLONOSCOPY, POSSIBLE BIOPSY, POSSIBLE POLYPECTOMY 84030;  Surgeon: Laura Grijalva MD;  Location: [facility]    HYSTERECTOMY  2015    total hysterectomy due to myomas. intact ovaries.     MIRENA, IUD  2013    OTHER SURGICAL HISTORY      elective     OTHER SURGICAL HISTORY  2021    Cysto (Dr. Gilson Alva)    [facility]jane Lock    TVT taping of cystocele    UPPER ARM/ELBOW SURGERY UNLISTED      repair fx of LUE      Family History   Problem Relation Age of Onset    Breast Cancer Paternal Grandmother 54    Hypertension Mother     Alcohol and Other Disorders Associated Mother         Cirrhosis    Other (eczema) Mother     Cancer Father         melanoma    Lipids Father     Other (hyperlipidemia) Father     Asthma Other         children    Heart Disease Paternal Grandfather     Breast Cancer Paternal Aunt       Social History    Socioeconomic History      Marital status:     Tobacco Use      Smoking status: Never      Smokeless tobacco: Never    Vaping Use      Vaping Use: Never used    Substance and Sexual Activity      Alcohol use: Yes        Comment: twice per month      Drug use: No      Sexual activity: Yes        Partners: Male    Other Topics      Concerns:        Caffeine Concern: Yes          2 cups daily        Exercise: Yes          7 x a week. walk          REVIEW OF SYSTEMS:     Today reviewed systems as documented below  GENERAL HEALTH: feels well otherwise  SKIN: denies any unusual skin lesions or rashes  RESPIRATORY: denies shortness of breath with exertion  CARDIOVASCULAR: denies chest pain on exertion  GI: denies abdominal pain and denies heartburn  NEURO: denies headaches  MUSCULO: denies arthritis, back pain      EXAM:   LMP 03/16/2015   GENERAL: well developed, well nourished, in no apparent distress  EXTREMITIES:   1. Integument: Normal skin temperature and turgor. Incision sites are well coapted with sutures intact. No dehiscence or other concerns. 2. Vascular: Dorsalis pedis two out of four bilateral and posterior tibial pulses two out of   four bilateral, capillary refill normal.   3. Musculoskeletal: All muscle groups are graded 5 out of 5 in the foot and ankle. Minor pain noted incision sites. No pain noted plantar heel. Mild pain/tightness noted to calf. 4. Neurological: Normal sharp dull sensation; reflexes normal.          ASSESSMENT AND PLAN:   Diagnoses and all orders for this visit:    Plantar fasciitis, right    Right foot pain    Orthopedic aftercare          Plan:     -status post right foot endoscopic plantar fasciotomy (DOS: 7/11/23). -Site inspected--noted to be healing well without concerns at this time.  -Sutures removed without suture removal kit.  -Site redressed with Betadine and Band-Aids. Can start normal bathing routine tomorrow.  -Can take ibuprofen as needed for pain.  -Rx Medrol Dosepak the patient can take [facility]) if needed. -Given mild calf tightness and pain will order stat venous Doppler to rule out DVT.   We will call patient with results.  -Educated patient on acute signs of infection/symptoms of DVT advised patient seek any medical judgment concerns arise. The patient indicates understanding of these issues and agrees to the plan. Return in about 2 weeks (around 8/8/2023) for plantar fasciotomy follow up.     Gilson Gutiérrez DPM

## 2023-08-02 ENCOUNTER — HOSPITAL ENCOUNTER (OUTPATIENT)
Dept: MAMMOGRAPHY | Age: 53
Discharge: HOME | End: 2023-08-02
Attending: INTERNAL MEDICINE
Payer: COMMERCIAL

## 2023-08-02 DIAGNOSIS — Z12.31 ENCOUNTER FOR SCREENING MAMMOGRAM FOR MALIGNANT NEOPLASM OF BREAST: ICD-10-CM

## 2023-08-02 PROCEDURE — 77063 BREAST TOMOSYNTHESIS BI: CPT | Performed by: INTERNAL MEDICINE

## 2023-08-02 PROCEDURE — 77067 SCR MAMMO BI INCL CAD: CPT | Performed by: INTERNAL MEDICINE

## 2023-08-04 ENCOUNTER — TELEPHONE (OUTPATIENT)
Dept: PODIATRY CLINIC | Facility: CLINIC | Age: 53
End: 2023-08-04

## 2023-08-08 ENCOUNTER — OFFICE VISIT (OUTPATIENT)
Dept: FAMILY MEDICINE CLINIC | Facility: CLINIC | Age: 53
End: 2023-08-08
Payer: COMMERCIAL

## 2023-08-08 VITALS
HEART RATE: 82 BPM | SYSTOLIC BLOOD PRESSURE: 136 MMHG | WEIGHT: 182 LBS | DIASTOLIC BLOOD PRESSURE: 88 MMHG | RESPIRATION RATE: 16 BRPM | TEMPERATURE: 98 F | BODY MASS INDEX: 30 KG/M2 | OXYGEN SATURATION: 98 %

## 2023-08-08 DIAGNOSIS — R39.9 UTI SYMPTOMS: Primary | ICD-10-CM

## 2023-08-08 LAB
APPEARANCE: CLEAR
BILIRUBIN: NEGATIVE
GLUCOSE (URINE DIPSTICK): NEGATIVE MG/DL
MULTISTIX LOT#: ABNORMAL NUMERIC
NITRITE, URINE: NEGATIVE
PH, URINE: 5.5 (ref 4.5–8)
PROTEIN (URINE DIPSTICK): NEGATIVE MG/DL
SPECIFIC GRAVITY: >=1.03 (ref 1–1.03)
URINE-COLOR: YELLOW
UROBILINOGEN,SEMI-QN: 0.2 MG/DL (ref 0–1.9)

## 2023-08-08 PROCEDURE — 3079F DIAST BP 80-89 MM HG: CPT | Performed by: PHYSICIAN ASSISTANT

## 2023-08-08 PROCEDURE — 3075F SYST BP GE 130 - 139MM HG: CPT | Performed by: PHYSICIAN ASSISTANT

## 2023-08-08 PROCEDURE — 81003 URINALYSIS AUTO W/O SCOPE: CPT | Performed by: PHYSICIAN ASSISTANT

## 2023-08-08 PROCEDURE — 99213 OFFICE O/P EST LOW 20 MIN: CPT | Performed by: PHYSICIAN ASSISTANT

## 2023-08-08 PROCEDURE — 87086 URINE CULTURE/COLONY COUNT: CPT | Performed by: PHYSICIAN ASSISTANT

## 2023-08-08 RX ORDER — NITROFURANTOIN 25; 75 MG/1; MG/1
100 CAPSULE ORAL 2 TIMES DAILY
Qty: 14 CAPSULE | Refills: 0 | Status: SHIPPED | OUTPATIENT
Start: 2023-08-08 | End: 2023-08-15

## 2023-08-09 ENCOUNTER — OFFICE VISIT (OUTPATIENT)
Dept: PODIATRY CLINIC | Facility: CLINIC | Age: 53
End: 2023-08-09

## 2023-08-09 DIAGNOSIS — Z47.89 ORTHOPEDIC AFTERCARE: ICD-10-CM

## 2023-08-09 DIAGNOSIS — M72.2 PLANTAR FASCIITIS, RIGHT: Primary | ICD-10-CM

## 2023-08-09 PROCEDURE — 99024 POSTOP FOLLOW-UP VISIT: CPT | Performed by: STUDENT IN AN ORGANIZED HEALTH CARE EDUCATION/TRAINING PROGRAM

## 2023-08-09 NOTE — PATIENT INSTRUCTIONS
Continue normal activity as tolerated without restrictions. May be discharged from surgical care. Follow-up as needed.

## 2023-08-09 NOTE — PROGRESS NOTES
[address] [facility] Podiatry  Progress Note    Marcos Singleton is a 52year old female. Patient presents with: Follow - Up: Right foot F/u- patient denies pain at this time- minimal pain when walking. HPI:     Patient is a pleasant 52-year-old female presents to clinic with  for postop visit status post right foot endoscopic plantar fasciotomy (DOS: 7/11/23). She recently got back from trip to [place] where she was doing a fair amount of walking and tolerated this well. She is without pain at this time. She feels much better than before surgery. She has been completing normal activity and supportive new balance tennis shoes. No other complaints are mentioned. Past medical history, medications, and allergies reviewed. Allergies: Dust Mites   Current Outpatient Medications   Medication Sig Dispense Refill    nitrofurantoin monohydrate macro 100 MG Oral Cap Take 1 capsule (100 mg total) by mouth 2 (two) times daily for 7 days. 14 capsule 0    cyanocobalamin 1000 MCG Oral Tab Take 1 tablet (1,000 mcg total) by mouth daily. Cholecalciferol (VITAMIN D) 50 MCG (2000 UT) Oral Cap Take by mouth. ferrous sulfate 325 (65 FE) MG Oral Tab EC Take 1 tablet (325 mg total) by mouth daily with breakfast.      LOSARTAN 50 MG Oral Tab TAKE 1 TABLET(50 MG) BY MOUTH DAILY 90 tablet 0    montelukast (SINGULAIR) 10 MG Oral Tab Take 1 tablet (10 mg total) by mouth daily. 90 tablet 3    Calcium Polycarbophil (FIBER) 625 MG Oral Tab Take 1 tablet (625 mg total) by mouth daily as needed. Fluticasone Propionate 50 MCG/ACT Nasal Suspension 1 spray by Nasal route daily. loratadine 10 MG Oral Tab Take 1 tablet (10 mg total) by mouth daily.         Past Medical History:   Diagnosis Date    Abdominal pain, left lower quadrant     Abnormal uterine bleeding     spotting for 3 days    Acute diverticulitis 5/4/2021    Acute pharyngitis     Allergic rhinitis, cause unspecified     Amenorrhea     Mirena Anxiety state, unspecified     Asthma, mild intermittent     Diverticulosis     Esophageal reflux     JARED (generalized anxiety disorder)     GERD (gastroesophageal reflux disease)     H/O mammogram 2014    benign    Headache(784.0)     High blood pressure     Other abnormal blood chemistry     Other atopic dermatitis and related conditions     Other malaise and fatigue     Pap smear for cervical cancer screening 1-    wnl pt stated    PONV (postoperative nausea and vomiting)     Unspecified constipation     Unspecified essential hypertension     Uterine fibroid     Visual impairment     glasses/contacts      Past Surgical History:   Procedure Laterality Date    CHOLECYSTECTOMY      COLECTOMY  2021    Dr. Jimy Lee, POSSIBLE TRANSVERSE COLON SEGMENTAL    COLONOSCOPY      COLONOSCOPY  2017    hp polyp, diverticulosis. repeat 10 year    COLONOSCOPY N/A 2017    Procedure: COLONOSCOPY, POSSIBLE BIOPSY, POSSIBLE POLYPECTOMY 38704;  Surgeon: Tia Hernandez MD;  Location: [facility]    HYSTERECTOMY  2015    total hysterectomy due to myomas. intact ovaries.     MIRENA, IUD  2013    OTHER SURGICAL HISTORY      elective     OTHER SURGICAL HISTORY  2021    Cysto (Dr. Malik Olvera)    Charlies Bowling    TVT taping of cystocele    UPPER ARM/ELBOW SURGERY UNLISTED      repair fx of LUE      Family History   Problem Relation Age of Onset    Hypertension Mother     Alcohol and Other Disorders Associated Mother         Cirrhosis    Other (eczema) Mother     Cancer Father         melanoma    Lipids Father     Other (hyperlipidemia) Father     Breast Cancer Paternal Grandmother 54    Heart Disease Paternal Grandfather     Breast Cancer Paternal Aunt 76    Asthma Other         children      Social History    Socioeconomic History      Marital status:     Tobacco Use      Smoking status: Never Smokeless tobacco: Never    Vaping Use      Vaping Use: Never used    Substance and Sexual Activity      Alcohol use: Yes        Comment: twice per month      Drug use: No      Sexual activity: Yes        Partners: Male    Other Topics      Concerns:        Caffeine Concern: Yes          2 cups daily        Exercise: Yes          7 x a week. walk          REVIEW OF SYSTEMS:     Today reviewed systems as documented below  GENERAL HEALTH: feels well otherwise  SKIN: denies any unusual skin lesions or rashes  RESPIRATORY: denies shortness of breath with exertion  CARDIOVASCULAR: denies chest pain on exertion  GI: denies abdominal pain and denies heartburn  NEURO: denies headaches  MUSCULO: denies arthritis, back pain      EXAM:   LMP 03/16/2015   GENERAL: well developed, well nourished, in no apparent distress  EXTREMITIES:   1. Integument: Normal skin temperature and turgor. Incision sites are well healed with minor scab medially. No concerns. 2. Vascular: Dorsalis pedis two out of four bilateral and posterior tibial pulses two out of   four bilateral, capillary refill normal.   3. Musculoskeletal: All muscle groups are graded 5 out of 5 in the foot and ankle. No pain noted incision sites. No pain noted plantar heel. No pain to calf. 4. Neurological: Normal sharp dull sensation; reflexes normal.          ASSESSMENT AND PLAN:   Diagnoses and all orders for this visit:    Plantar fasciitis, right    Orthopedic aftercare          Plan:     -status post right foot endoscopic plantar fasciotomy (DOS: 7/11/23). -Patient examined, chart is reviewed. Patient is doing very well and is returned to normal activity and supportive shoes and is without pain at this time. She is doing much better than before surgery. She can continue to increase activity as tolerated, letting pain be guide. She should still be conservative as she increases activity. She can continue to implement light stretching.   She may be discharged from surgical care at this time. Can follow-up as needed. .    The patient indicates understanding of these issues and agrees to the plan. Return if symptoms worsen or fail to improve.     Celia Doran DPM

## 2023-08-22 ENCOUNTER — PATIENT MESSAGE (OUTPATIENT)
Dept: INTERNAL MEDICINE CLINIC | Facility: CLINIC | Age: 53
End: 2023-08-22

## 2023-08-22 DIAGNOSIS — F41.9 ANXIETY: ICD-10-CM

## 2023-08-22 RX ORDER — ALPRAZOLAM 0.25 MG/1
0.25 TABLET ORAL EVERY 6 HOURS PRN
Qty: 30 TABLET | Refills: 0 | Status: SHIPPED | OUTPATIENT
Start: 2023-08-22

## 2023-08-22 NOTE — TELEPHONE ENCOUNTER
From: Keke Valencia  To: Colleen Zuñiga PA-C  Sent: 8/22/2023 12:06 PM CDT  Subject: Michael Lindsey,     Can I get a refill of the generic Xanax? Going back to school has increased my anxiety.     Thank you,    Prabhakar Aldridge

## 2023-10-11 ENCOUNTER — PATIENT MESSAGE (OUTPATIENT)
Dept: PODIATRY CLINIC | Facility: CLINIC | Age: 53
End: 2023-10-11

## 2023-10-11 DIAGNOSIS — M72.2 PLANTAR FASCIITIS, RIGHT: Primary | ICD-10-CM

## 2023-10-16 RX ORDER — METHYLPREDNISOLONE 4 MG/1
TABLET ORAL
Qty: 21 TABLET | Refills: 0 | Status: SHIPPED | OUTPATIENT
Start: 2023-10-16

## 2023-10-16 NOTE — TELEPHONE ENCOUNTER
Spoke with patient via telephone. Lateral column pain fairly common after this procedure--usually self limiting. Rx medrol dosepak. She can continue supportive shoes and inserts. Can we schedule follow up in 2-3 weeks? OK to OB.

## 2023-11-05 DIAGNOSIS — I10 ESSENTIAL HYPERTENSION, BENIGN: ICD-10-CM

## 2023-11-06 ENCOUNTER — TELEMEDICINE (OUTPATIENT)
Dept: INTERNAL MEDICINE CLINIC | Facility: CLINIC | Age: 53
End: 2023-11-06
Payer: COMMERCIAL

## 2023-11-06 DIAGNOSIS — L71.9 ROSACEA: Primary | ICD-10-CM

## 2023-11-06 RX ORDER — BRIMONIDINE 5 MG/G
1 GEL TOPICAL NIGHTLY
Qty: 1 EACH | Refills: 0 | Status: SHIPPED | OUTPATIENT
Start: 2023-11-06

## 2023-11-06 RX ORDER — LOSARTAN POTASSIUM 50 MG/1
50 TABLET ORAL DAILY
Qty: 90 TABLET | Refills: 0 | Status: SHIPPED | OUTPATIENT
Start: 2023-11-06

## 2023-11-06 NOTE — PROGRESS NOTES
HPI:    Patient ID: Marcia Pringle is a 52year old female. No chief complaint on file. This visit is conducted using Telemedicine with live, interactive video and audio. Has noted clindamycin cream is not working for Rosacea which was prescribed by dermatologist.  She is interesting in trying something new        Review of Systems   Constitutional: Negative. Respiratory: Negative. Cardiovascular: Negative. Gastrointestinal: Negative. Skin:  Positive for rash. Past Medical History:   Diagnosis Date    Abdominal pain, left lower quadrant     Abnormal uterine bleeding     spotting for 3 days    Acute diverticulitis 5/4/2021    Acute pharyngitis     Allergic rhinitis, cause unspecified     Amenorrhea     Mirena    Anxiety state, unspecified     Asthma, mild intermittent     Diverticulosis     Esophageal reflux     JARED (generalized anxiety disorder)     GERD (gastroesophageal reflux disease)     H/O mammogram 1-7-2014    benign    Headache(784.0)     High blood pressure     Other abnormal blood chemistry     Other atopic dermatitis and related conditions     Other malaise and fatigue     Pap smear for cervical cancer screening 1-    wnl pt stated    PONV (postoperative nausea and vomiting)     Unspecified constipation     Unspecified essential hypertension     Uterine fibroid     Visual impairment     glasses/contacts     Past Surgical History:   Procedure Laterality Date    CHOLECYSTECTOMY  1998    COLECTOMY  06/30/2021    Dr. Dolores Weller, POSSIBLE TRANSVERSE COLON SEGMENTAL    COLONOSCOPY      COLONOSCOPY  09/2017    hp polyp, diverticulosis. repeat 10 year    COLONOSCOPY N/A 9/27/2017    Procedure: COLONOSCOPY, POSSIBLE BIOPSY, POSSIBLE POLYPECTOMY 45137;  Surgeon: Myles Huston MD;  Location: [facility]    HYSTERECTOMY  03/2015    total hysterectomy due to myomas. intact ovaries.     MIRENA, IUD  1/2013 OTHER SURGICAL HISTORY      elective     OTHER SURGICAL HISTORY  2021    Cysto (Dr. Agustin Sutton)    Noel Burch    TVT taping of cystocele    UPPER ARM/ELBOW SURGERY UNLISTED      repair fx of LUE     Family History   Problem Relation Age of Onset    Hypertension Mother     Alcohol and Other Disorders Associated Mother         Cirrhosis    Other (eczema) Mother     Cancer Father         melanoma    Lipids Father     Other (hyperlipidemia) Father     Breast Cancer Paternal Grandmother 54    Heart Disease Paternal Grandfather     Breast Cancer Paternal Aunt 76    Asthma Other         children     Social History    Socioeconomic History      Marital status:     Tobacco Use      Smoking status: Never      Smokeless tobacco: Never    Vaping Use      Vaping Use: Never used    Substance and Sexual Activity      Alcohol use: Yes        Comment: twice per month      Drug use: No      Sexual activity: Yes        Partners: Male    Other Topics      Concerns:        Caffeine Concern: Yes          2 cups daily        Exercise: Yes          7 x a week. walk         Current Outpatient Medications   Medication Sig Dispense Refill    Brimonidine Tartrate 0.33 % External Gel Apply 1 each topically at bedtime. 1 each 0    losartan 50 MG Oral Tab Take 1 tablet (50 mg total) by mouth daily. 90 tablet 0    methylPREDNISolone 4 MG Oral Tablet Therapy Pack Take as prescribed on  pack 21 tablet 0    methylPREDNISolone 4 MG Oral Tablet Therapy Pack Take as prescribed on pack. 21 tablet 0    ALPRAZolam 0.25 MG Oral Tab Take 1 tablet (0.25 mg total) by mouth every 6 (six) hours as needed. 30 tablet 0    cyanocobalamin 1000 MCG Oral Tab Take 1 tablet (1,000 mcg total) by mouth daily. Cholecalciferol (VITAMIN D) 50 MCG (2000 UT) Oral Cap Take by mouth.       ferrous sulfate 325 (65 FE) MG Oral Tab EC Take 1 tablet (325 mg total) by mouth daily with breakfast.      montelukast (SINGULAIR) 10 MG Oral Tab Take 1 tablet (10 mg total) by mouth daily. 90 tablet 3    Calcium Polycarbophil (FIBER) 625 MG Oral Tab Take 1 tablet (625 mg total) by mouth daily as needed. Fluticasone Propionate 50 MCG/ACT Nasal Suspension 1 spray by Nasal route daily. loratadine 10 MG Oral Tab Take 1 tablet (10 mg total) by mouth daily.        Allergies:  Dust Mites              HIVES    Lab Results   Component Value Date    GLU 67 01/27/2023    BUN 19 01/27/2023    BUNCREA 18.6 04/07/2021    CREATSERUM 1.03 01/27/2023    ANIONGAP 8 08/10/2021    GFR 87 11/13/2017    GFRNAA 65 01/27/2023    GFRAA 81 08/10/2021    CA 10.1 01/27/2023    OSMOCALC 288 08/10/2021    ALKPHO 78 08/10/2021    AST 17 01/27/2023    ALT 23 01/27/2023    BILT 0.3 01/27/2023    TP 6.9 01/27/2023    ALB 4.1 08/10/2021    GLOBULIN 3.6 08/10/2021     08/10/2021    K 4.3 01/27/2023     01/27/2023    CO2 26 01/27/2023     Lab Results   Component Value Date    WBC 7.1 01/27/2023    RBC 5.13 08/10/2021    HGB 14.7 08/10/2021    HCT 43.6 01/27/2023    MCV 89 01/27/2023    MCH 28.7 08/10/2021    MCHC 32.0 08/10/2021    RDW 13.1 08/10/2021     01/27/2023     Lab Results   Component Value Date    CHOLEST 182 01/27/2023    TRIG 208 01/27/2023    HDL 52 01/27/2023     (H) 08/10/2021    VLDL 41 (H) 08/10/2021    TCHDLRATIO 305.00 01/27/2023    NONHDLC 155 (H) 08/10/2021     Lab Results   Component Value Date     08/10/2021    A1C 5.5 08/10/2021     Lab Results   Component Value Date    TSH 1.640 08/10/2021     Lab Results   Component Value Date    VITD 31.3 07/24/2023     Lab Results   Component Value Date    OBIE 239.3 07/24/2023     No results found for: \"FOLIC\", \"FOLATESER\", \"FOLATE\"  Lab Results   Component Value Date    IRON 63 07/24/2023     Lab Results   Component Value Date    B12 799 07/24/2023     Lab Results   Component Value Date    PHOS 3.1 02/27/2017     No results found for: \"MG\"     PHYSICAL EXAM:     Physical Exam  - well appearing via video visit   - no respiratory distress  - able to speak in full sentences  - alert and oriented          ASSESSMENT/PLAN:   Diagnoses and all orders for this visit:    Rosacea  -     Brimonidine Tartrate 0.33 % External Gel; Apply 1 each topically at bedtime.         JESSICA Wu

## 2023-11-06 NOTE — TELEPHONE ENCOUNTER
Last time medication was refilled 06/16/2023  Quantity and # of refills 90 w 0  Last OV 01/16/2023  Next OV 11/06/2023    Protocol failed   Sent to [employer] for approval.

## 2023-11-07 ENCOUNTER — OFFICE VISIT (OUTPATIENT)
Dept: PODIATRY CLINIC | Facility: CLINIC | Age: 53
End: 2023-11-07
Payer: COMMERCIAL

## 2023-11-07 VITALS — SYSTOLIC BLOOD PRESSURE: 128 MMHG | DIASTOLIC BLOOD PRESSURE: 74 MMHG

## 2023-11-07 DIAGNOSIS — Z47.89 ORTHOPEDIC AFTERCARE: ICD-10-CM

## 2023-11-07 DIAGNOSIS — M25.374 INSTABILITY OF RIGHT FOOT JOINT: ICD-10-CM

## 2023-11-07 DIAGNOSIS — M72.2 PLANTAR FASCIITIS, RIGHT: Primary | ICD-10-CM

## 2023-11-07 DIAGNOSIS — M79.671 RIGHT FOOT PAIN: ICD-10-CM

## 2023-11-07 PROCEDURE — 3078F DIAST BP <80 MM HG: CPT | Performed by: STUDENT IN AN ORGANIZED HEALTH CARE EDUCATION/TRAINING PROGRAM

## 2023-11-07 PROCEDURE — 20550 NJX 1 TENDON SHEATH/LIGAMENT: CPT | Performed by: STUDENT IN AN ORGANIZED HEALTH CARE EDUCATION/TRAINING PROGRAM

## 2023-11-07 PROCEDURE — 3074F SYST BP LT 130 MM HG: CPT | Performed by: STUDENT IN AN ORGANIZED HEALTH CARE EDUCATION/TRAINING PROGRAM

## 2023-11-07 RX ORDER — DEXAMETHASONE SODIUM PHOSPHATE 4 MG/ML
2 VIAL (ML) INJECTION ONCE
Status: COMPLETED | OUTPATIENT
Start: 2023-11-07 | End: 2023-11-07

## 2023-11-07 NOTE — PROGRESS NOTES
Per Dr. Daniele Patrick to draw up .5ml of dexamethasone sodium phosphate. .5ml Kenalog. 10 and 1ml marcaine 0.5% for a right foot injection.

## 2023-11-08 NOTE — PROGRESS NOTES
[facility] Podiatry  Progress Note    Zaire Alonzo is a 52year old female. Chief Complaint   Patient presents with    Follow - Up     Right foot- rates pain 3/10         HPI:     Patient is a pleasant 40-year-old female presents to clinic with  for postop visit status post right foot endoscopic plantar fasciotomy (DOS: 7/11/23). Patient is doing well with respect to her plantar fasciitis symptoms. She is no longer has pain to her plantar heel. However, she states that started having some lateral column pain which can be normal after this type of surgery. She took Medrol Dosepak which provided temporary relief. She also admits to supportive shoes and inserts. She presents today for evaluation. She rates her pain at 3 out of 10. Allergies: Dust mites   Current Outpatient Medications   Medication Sig Dispense Refill    losartan 50 MG Oral Tab Take 1 tablet (50 mg total) by mouth daily. 90 tablet 0    Brimonidine Tartrate 0.33 % External Gel Apply 1 each topically at bedtime. 1 each 0    ALPRAZolam 0.25 MG Oral Tab Take 1 tablet (0.25 mg total) by mouth every 6 (six) hours as needed. 30 tablet 0    Cholecalciferol (VITAMIN D) 50 MCG (2000 UT) Oral Cap Take by mouth. ferrous sulfate 325 (65 FE) MG Oral Tab EC Take 1 tablet (325 mg total) by mouth daily with breakfast.      montelukast (SINGULAIR) 10 MG Oral Tab Take 1 tablet (10 mg total) by mouth daily. 90 tablet 3    Calcium Polycarbophil (FIBER) 625 MG Oral Tab Take 1 tablet (625 mg total) by mouth daily as needed. Fluticasone Propionate 50 MCG/ACT Nasal Suspension 1 spray by Nasal route daily. loratadine 10 MG Oral Tab Take 1 tablet (10 mg total) by mouth daily. methylPREDNISolone 4 MG Oral Tablet Therapy Pack Take as prescribed on  pack 21 tablet 0    methylPREDNISolone 4 MG Oral Tablet Therapy Pack Take as prescribed on pack.  21 tablet 0    cyanocobalamin 1000 MCG Oral Tab Take 1 tablet (1,000 mcg total) by mouth daily. Past Medical History:   Diagnosis Date    Abdominal pain, left lower quadrant     Abnormal uterine bleeding     spotting for 3 days    Acute diverticulitis 2021    Acute pharyngitis     Allergic rhinitis, cause unspecified     Amenorrhea     Mirena    Anxiety state, unspecified     Asthma, mild intermittent     Diverticulosis     Esophageal reflux     JARED (generalized anxiety disorder)     GERD (gastroesophageal reflux disease)     H/O mammogram 2014    benign    Headache(784.0)     High blood pressure     Other abnormal blood chemistry     Other atopic dermatitis and related conditions     Other malaise and fatigue     Pap smear for cervical cancer screening 1-    wnl pt stated    PONV (postoperative nausea and vomiting)     Unspecified constipation     Unspecified essential hypertension     Uterine fibroid     Visual impairment     glasses/contacts      Past Surgical History:   Procedure Laterality Date    CHOLECYSTECTOMY      COLECTOMY  2021    Dr. Kvng Narvaez, POSSIBLE TRANSVERSE COLON SEGMENTAL    COLONOSCOPY      COLONOSCOPY  2017    hp polyp, diverticulosis. repeat 10 year    COLONOSCOPY N/A 2017    Procedure: COLONOSCOPY, POSSIBLE BIOPSY, POSSIBLE POLYPECTOMY 06638;  Surgeon: Kvng Bran MD;  Location: [facility]    HYSTERECTOMY  2015    total hysterectomy due to myomas. intact ovaries.     MIRENA, IUD  2013    OTHER SURGICAL HISTORY      elective     OTHER SURGICAL HISTORY  2021    Cysto (Dr. Jarod Romero)    Shima Diaz    TVT taping of cystocele    UPPER ARM/ELBOW SURGERY UNLISTED      repair fx of LUE      Family History   Problem Relation Age of Onset    Hypertension Mother     Alcohol and Other Disorders Associated Mother         Cirrhosis    Other (eczema) Mother     Cancer Father         melanoma    Lipids Father     Other (hyperlipidemia) Father Breast Cancer Paternal Grandmother 54    Heart Disease Paternal Grandfather     Breast Cancer Paternal Aunt 76    Asthma Other         children      Social History     Socioeconomic History    Marital status:    Tobacco Use    Smoking status: Never    Smokeless tobacco: Never   Vaping Use    Vaping Use: Never used   Substance and Sexual Activity    Alcohol use: Yes     Comment: twice per month    Drug use: No    Sexual activity: Yes     Partners: Male   Other Topics Concern    Caffeine Concern Yes     Comment: 2 cups daily    Exercise Yes     Comment: 7 x a week. walk           REVIEW OF SYSTEMS:     Today reviewed systems as documented below  GENERAL HEALTH: feels well otherwise  SKIN: denies any unusual skin lesions or rashes  RESPIRATORY: denies shortness of breath with exertion  CARDIOVASCULAR: denies chest pain on exertion  GI: denies abdominal pain and denies heartburn  NEURO: denies headaches  MUSCULO: denies arthritis, back pain      EXAM:   /74 (BP Location: Left arm, Patient Position: Sitting, Cuff Size: adult)   LMP 03/16/2015   GENERAL: well developed, well nourished, in no apparent distress  EXTREMITIES:   1. Integument: Normal skin temperature and turgor. Incision sites are well healed. 2. Vascular: Dorsalis pedis two out of four bilateral and posterior tibial pulses two out of   four bilateral, capillary refill normal.   3. Musculoskeletal: All muscle groups are graded 5 out of 5 in the foot and ankle. No pain noted incision sites. No pain noted plantar heel. No pain to calf. Pain noted along the lateral column of plantar foot/lateral band of plantar fascia right lower extremity.    4. Neurological: Normal sharp dull sensation; reflexes normal.          ASSESSMENT AND PLAN:   Diagnoses and all orders for this visit:    Plantar fasciitis, right  -     dexamethasone (Decadron) 4 MG/ML injection 2 mg  -     triamcinolone acetonide (Kenalog-10) 10 mg/mL injection    Orthopedic aftercare    Right foot pain    Instability of right foot joint          Plan:     -status post right foot endoscopic plantar fasciotomy (DOS: 7/11/23). -Patient examined, chart is reviewed. -Patient is doing very well with respect to plantar fasciitis symptoms. These remain resolved. She is having some lateral column pain/instability.  -Orthotics modified with cuboid pad to help support site.  -Discussed with patient this can be normal after this type surgery is usually self-limiting.  -However, would recommend cortisone injection to try to calm side down. Patient agreeable and written consent was obtained. -After prepping site with alcohol, administer cortisone injection to lateral column of right foot/lateral band of plantar fascia consisting of 1 cc of 0.5% Marcaine plain, 0.5 cc, 10, and 0.5 cc dexamethasone. Patient tolerated injection well and there were no complications.  -Patient will notify me of progress via MyChart in 2 weeks. She should isolate and ambulate with supportive shoes and inserts. If no improvement may consider physical therapy or a new pair of orthotics. The patient indicates understanding of these issues and agrees to the plan.       Marge Sheldon DPM

## 2023-11-13 ENCOUNTER — TELEMEDICINE (OUTPATIENT)
Dept: TELEHEALTH | Age: 53
End: 2023-11-13
Payer: COMMERCIAL

## 2023-11-13 DIAGNOSIS — L71.9 ROSACEA: Primary | ICD-10-CM

## 2023-11-13 RX ORDER — METRONIDAZOLE 10 MG/G
1 GEL TOPICAL DAILY
Qty: 60 G | Refills: 0 | Status: SHIPPED | OUTPATIENT
Start: 2023-11-13

## 2024-01-19 ENCOUNTER — OFFICE VISIT (OUTPATIENT)
Dept: FAMILY MEDICINE CLINIC | Facility: CLINIC | Age: 54
End: 2024-01-19
Payer: COMMERCIAL

## 2024-01-19 VITALS
HEART RATE: 79 BPM | WEIGHT: 180 LBS | DIASTOLIC BLOOD PRESSURE: 88 MMHG | HEIGHT: 65 IN | SYSTOLIC BLOOD PRESSURE: 136 MMHG | RESPIRATION RATE: 18 BRPM | OXYGEN SATURATION: 98 % | BODY MASS INDEX: 29.99 KG/M2 | TEMPERATURE: 98 F

## 2024-01-19 DIAGNOSIS — J02.9 PHARYNGITIS, UNSPECIFIED ETIOLOGY: Primary | ICD-10-CM

## 2024-01-19 DIAGNOSIS — J02.9 SORE THROAT: ICD-10-CM

## 2024-01-19 DIAGNOSIS — Z20.818 STREPTOCOCCUS EXPOSURE: ICD-10-CM

## 2024-01-19 LAB
AMB EXT BILIRUBIN, TOTAL: 0.3 MG/DL
AMB EXT BUN: 23 MG/DL
AMB EXT CALCIUM: 10.1
AMB EXT CARBON DIOXIDE: 22
AMB EXT CHLORIDE: 104
AMB EXT CHOL/HDL RATIO: 4.5
AMB EXT CHOLESTEROL, TOTAL: 192 MG/DL
AMB EXT CMP ALT: 30 U/L
AMB EXT CMP AST: 22 U/L
AMB EXT CREATININE: 0.9 MG/DL
AMB EXT EGFR NON-AA: 75
AMB EXT GLUCOSE: 89 MG/DL
AMB EXT HDL CHOLESTEROL: 43 MG/DL
AMB EXT HEMATOCRIT: 44.6
AMB EXT HEMOGLOBIN: 14.6
AMB EXT LDL CHOLESTEROL, DIRECT: 87 MG/DL
AMB EXT MCV: 91
AMB EXT PLATELETS: 272
AMB EXT POSTASSIUM: 4.6 MMOL/L
AMB EXT SODIUM: 141 MMOL/L
AMB EXT TOTAL PROTEIN: 6.9
AMB EXT TRIGLYCERIDES: 376 MG/DL
AMB EXT WBC: 8.5 X10(3)UL
CONTROL LINE PRESENT WITH A CLEAR BACKGROUND (YES/NO): YES YES/NO
KIT LOT #: NORMAL NUMERIC
STREP GRP A CUL-SCR: NEGATIVE

## 2024-01-19 PROCEDURE — 87081 CULTURE SCREEN ONLY: CPT | Performed by: PHYSICIAN ASSISTANT

## 2024-01-19 PROCEDURE — 3075F SYST BP GE 130 - 139MM HG: CPT | Performed by: PHYSICIAN ASSISTANT

## 2024-01-19 PROCEDURE — 3079F DIAST BP 80-89 MM HG: CPT | Performed by: PHYSICIAN ASSISTANT

## 2024-01-19 PROCEDURE — 87637 SARSCOV2&INF A&B&RSV AMP PRB: CPT | Performed by: PHYSICIAN ASSISTANT

## 2024-01-19 PROCEDURE — 87880 STREP A ASSAY W/OPTIC: CPT | Performed by: PHYSICIAN ASSISTANT

## 2024-01-19 PROCEDURE — 3008F BODY MASS INDEX DOCD: CPT | Performed by: PHYSICIAN ASSISTANT

## 2024-01-19 PROCEDURE — 99213 OFFICE O/P EST LOW 20 MIN: CPT | Performed by: PHYSICIAN ASSISTANT

## 2024-01-20 LAB
FLUAV + FLUBV RNA SPEC NAA+PROBE: NEGATIVE
FLUAV + FLUBV RNA SPEC NAA+PROBE: NEGATIVE
RSV RNA SPEC NAA+PROBE: NEGATIVE
SARS-COV-2 RNA RESP QL NAA+PROBE: NOT DETECTED

## 2024-01-20 NOTE — PROGRESS NOTES
CHIEF COMPLAINT:     Chief Complaint   Patient presents with    Sore Throat     Started Tuesday, no fevers   Body aches        HPI:   Steffanie Orta is a 53 year old female who presents with sore throat and bodyaches.  Sore throat for 3 days and  body aches since yesterday.       Associated symptoms:    Fever/Chills  No  Sore throat  Yes  Cough  no changes in her chronic cough.    Congestion No  Bodyache  Yes  Headache  Yes  Chest pain No  SOB/Dyspnea No  Loss of taste No  Loss of smell No  Diarrhea No  Vomiting No    Had strep exposure to [facility] last weekend.    + influenza A exposure thru co worker.    No covid exposures.      Covid vaccinations:  primary series.    No flu shot  this season.         Current Outpatient Medications   Medication Sig Dispense Refill    metroNIDAZOLE 1 % External Gel Apply 1 Application topically daily. 60 g 0    losartan 50 MG Oral Tab Take 1 tablet (50 mg total) by mouth daily. 90 tablet 0    ALPRAZolam 0.25 MG Oral Tab Take 1 tablet (0.25 mg total) by mouth every 6 (six) hours as needed. 30 tablet 0    cyanocobalamin 1000 MCG Oral Tab Take 1 tablet (1,000 mcg total) by mouth daily.      Cholecalciferol (VITAMIN D) 50 MCG (2000 UT) Oral Cap Take by mouth.      ferrous sulfate 325 (65 FE) MG Oral Tab EC Take 1 tablet (325 mg total) by mouth daily with breakfast.      Calcium Polycarbophil (FIBER) 625 MG Oral Tab Take 1 tablet (625 mg total) by mouth daily as needed.      loratadine 10 MG Oral Tab Take 1 tablet (10 mg total) by mouth daily.      Fluticasone Propionate 50 MCG/ACT Nasal Suspension 1 spray by Nasal route daily. (Patient not taking: Reported on 1/19/2024)        Past Medical History:   Diagnosis Date    Abdominal pain, left lower quadrant     Abnormal uterine bleeding     spotting for 3 days    Acute diverticulitis 5/4/2021    Acute pharyngitis     Allergic rhinitis, cause unspecified     Amenorrhea     Mirena    Anxiety state, unspecified     Asthma, mild  intermittent     Diverticulosis     Esophageal reflux     JARED (generalized anxiety disorder)     GERD (gastroesophageal reflux disease)     H/O mammogram 1-7-2014    benign    Headache(784.0)     High blood pressure     Other abnormal blood chemistry     Other atopic dermatitis and related conditions     Other malaise and fatigue     Pap smear for cervical cancer screening 1-    wnl pt stated    PONV (postoperative nausea and vomiting)     Unspecified constipation     Unspecified essential hypertension     Uterine fibroid     Visual impairment     glasses/contacts      Social History:  Social History     Socioeconomic History    Marital status:    Tobacco Use    Smoking status: Never    Smokeless tobacco: Never   Vaping Use    Vaping Use: Never used   Substance and Sexual Activity    Alcohol use: Yes     Comment: twice per month    Drug use: No    Sexual activity: Yes     Partners: Male   Other Topics Concern    Caffeine Concern Yes     Comment: 2 cups daily    Exercise Yes     Comment: 7 x a week. walk        Review of Systems:    Positive for stated complaint: sore throat and bodyache.   Pertinent positives and negatives noted in the the HPI.    EXAM:   /88   Pulse 79   Temp 98 °F (36.7 °C)   Resp 18   Ht 5' 5\" (1.651 m)   Wt 180 lb (81.6 kg)   LMP 03/16/2015   SpO2 98%   BMI 29.95 kg/m²   GENERAL: well developed, well nourished,in no apparent distress  SKIN: no rashes,no suspicious lesions  HEAD: atraumatic, normocephalic  EYES: conjunctiva clear, sclera white,  PERRLA  EARS: TM's clear  NOSE: nares patent, mucosa mild congestion  THROAT: Posterior pharynx is  erythematous, tonsils 1 + without exudate  NECK: supple, non-tender  LUNGS: clear to auscultation bilaterally without rale, ronchi, wheeze.  CARDIO: S1/S2 without murmur  GI: BS's present x4. No palpable masses or organomegaly.  no tenderness on palpation.  EXTREMITIES: no cyanosis, clubbing or edema  LYMPH:  no gross  lymphadenopathy.      Recent Results (from the past 24 hour(s))   Strep A Assay W/Optic    Collection Time: 01/19/24  7:25 PM   Result Value Ref Range    Strep Grp A Screen Negative Negative    Control Line Present with a clear background (yes/no) yes Yes/No    Kit Lot # [id number] Numeric    Kit Expiration Date 04/22/2025 Date         ASSESSMENT AND PLAN:   Steffanie Orta is a 53 year old female who presents with Sore Throat (Started Tuesday, no fevers /Body aches ). Symptoms are consistent with:      ASSESSMENT:  Encounter Diagnoses   Name Primary?    Sore throat     Streptococcus exposure     Pharyngitis, unspecified etiology Yes         PLAN: Al[employer] Quad Viral sent.    RSS is negative.  Follow up culture sent.       Symptomatic care:   1. Rest. Drink plenty of fluids.  2. Tylenol or ibuprofen for discomfort or fever.   3. OTC decongestant (phenylephrine) expectorants (guaifenesin), nasal steroid sprays  (fluticasone) may be helpful        for congestion.  4. OTC cough suppressant for cough  (dextromethorphan)  5. Chloraseptic spray/throat lozenges for sore throat       If COVID test is positive: Discussed cdc guidelines regarding home isolation to include a minimum of 5 days of isolation from the start of symptoms or date of positive test if symptom onset date is unclear, have general improvement in symptoms, and be fever free for at least 24 hours without fever reducers before leaving isolation.        Go to the ED for evaluation with progressive symptoms of difficulty swallowing, breathing, shortness of breath, chest pain, extreme weakness, or confusion.         Meds & Refills for this Visit:  Requested Prescriptions      No prescriptions requested or ordered in this encounter       Risks, benefits, side effects of medication addressed and explained.    There are no Patient Instructions on file for this visit.    The patient indicates understanding of these issues and agrees to the plan.  The patient is  asked to follow up PCP

## 2024-01-22 ENCOUNTER — TELEPHONE (OUTPATIENT)
Dept: INTERNAL MEDICINE CLINIC | Facility: CLINIC | Age: 54
End: 2024-01-22

## 2024-01-22 NOTE — TELEPHONE ENCOUNTER
Received lab results from External lab, reviewed by Joanna MARISCAL  Recommended appt, left message on   Ext result console updated  Sent to scan

## 2024-01-24 ENCOUNTER — HOSPITAL ENCOUNTER (OUTPATIENT)
Dept: CT IMAGING | Age: 54
Discharge: HOME | End: 2024-01-24
Attending: NURSE PRACTITIONER
Payer: COMMERCIAL

## 2024-01-24 ENCOUNTER — OFFICE VISIT (OUTPATIENT)
Dept: INTERNAL MEDICINE CLINIC | Facility: CLINIC | Age: 54
End: 2024-01-24
Payer: COMMERCIAL

## 2024-01-24 VITALS
BODY MASS INDEX: 30 KG/M2 | OXYGEN SATURATION: 96 % | WEIGHT: 182 LBS | HEART RATE: 77 BPM | SYSTOLIC BLOOD PRESSURE: 120 MMHG | DIASTOLIC BLOOD PRESSURE: 78 MMHG | RESPIRATION RATE: 18 BRPM | TEMPERATURE: 98 F

## 2024-01-24 DIAGNOSIS — G43.B0 OPHTHALMOPLEGIC MIGRAINE, NOT INTRACTABLE: ICD-10-CM

## 2024-01-24 DIAGNOSIS — Z00.00 ANNUAL PHYSICAL EXAM: Primary | ICD-10-CM

## 2024-01-24 DIAGNOSIS — R10.32 LLQ PAIN: ICD-10-CM

## 2024-01-24 DIAGNOSIS — E78.1 HYPERTRIGLYCERIDEMIA: ICD-10-CM

## 2024-01-24 DIAGNOSIS — I10 ESSENTIAL HYPERTENSION, BENIGN: ICD-10-CM

## 2024-01-24 PROCEDURE — 99396 PREV VISIT EST AGE 40-64: CPT | Performed by: NURSE PRACTITIONER

## 2024-01-24 PROCEDURE — 3078F DIAST BP <80 MM HG: CPT | Performed by: NURSE PRACTITIONER

## 2024-01-24 PROCEDURE — 74177 CT ABD & PELVIS W/CONTRAST: CPT | Performed by: NURSE PRACTITIONER

## 2024-01-24 PROCEDURE — 3074F SYST BP LT 130 MM HG: CPT | Performed by: NURSE PRACTITIONER

## 2024-01-24 RX ORDER — IOHEXOL 350 MG/ML
100 INJECTION, SOLUTION INTRAVENOUS
Status: COMPLETED | OUTPATIENT
Start: 2024-01-24 | End: 2024-01-24

## 2024-01-24 RX ADMIN — IOHEXOL 100 ML: 350 INJECTION, SOLUTION INTRAVENOUS at 18:05:00

## 2024-01-24 NOTE — PROGRESS NOTES
HPI:    Patient ID: Steffanie Orta is a 53 year old female.    Chief Complaint   Patient presents with    Groin Pain    Lab Results    Migraine       LLQ pain has been persistent. She has history of diverticulitis, needed bowel resection. She never had fevers with other attacks. Her bowel movements are stable, no nausea or vomiting.   We reviewed labs today     Migraine   This is a recurrent problem. The current episode started more than 1 month ago. The problem occurs monthly (when she has to stare at computer screen). The problem has been waxing and waning. The pain is located in the Frontal region. The pain does not radiate. The pain quality is similar to prior headaches. The quality of the pain is described as band-like. Associated symptoms include photophobia. Pertinent negatives include no coughing or fever. The symptoms are aggravated by bright light. She has tried nothing for the symptoms. Her past medical history is significant for cluster headaches and migraine headaches.       Review of Systems   Constitutional: Negative.  Negative for appetite change, fatigue and fever.   Eyes:  Positive for photophobia.   Respiratory: Negative.  Negative for cough and shortness of breath.    Cardiovascular: Negative.  Negative for chest pain and leg swelling.   Gastrointestinal: Negative.    Genitourinary: Negative.    Neurological: Negative.    Psychiatric/Behavioral: Negative.           Past Medical History:   Diagnosis Date    Abdominal pain, left lower quadrant     Abnormal uterine bleeding     spotting for 3 days    Acute diverticulitis 5/4/2021    Acute pharyngitis     Allergic rhinitis, cause unspecified     Amenorrhea     Mirena    Anxiety state, unspecified     Asthma, mild intermittent     Diverticulosis     Esophageal reflux     JARED (generalized anxiety disorder)     GERD (gastroesophageal reflux disease)     H/O mammogram 1-7-2014    benign    Headache(784.0)     High blood pressure     Other abnormal  blood chemistry     Other atopic dermatitis and related conditions     Other malaise and fatigue     Pap smear for cervical cancer screening 1-    wnl pt stated    PONV (postoperative nausea and vomiting)     Unspecified constipation     Unspecified essential hypertension     Uterine fibroid     Visual impairment     glasses/contacts     Past Surgical History:   Procedure Laterality Date    CHOLECYSTECTOMY  1998    COLECTOMY  2021    Dr. White ROBOTIC LOW ANTERIOR COLON RESECTION OF RECTOSIGMOID COLON, POSSIBLE TRANSVERSE COLON SEGMENTAL    COLONOSCOPY      COLONOSCOPY  2017    hp polyp, diverticulosis. repeat 10 year    COLONOSCOPY N/A 2017    Procedure: COLONOSCOPY, POSSIBLE BIOPSY, POSSIBLE POLYPECTOMY 49975;  Surgeon: Abraham Pelletier MD;  Location: [facility]    HYSTERECTOMY  2015    total hysterectomy due to myomas. intact ovaries.    MIRENA, IUD  2013    OTHER SURGICAL HISTORY      elective     OTHER SURGICAL HISTORY  2021    Cysto (Dr. Sheth)    SLING OPER STRES INCONTINENCE      TVT taping of cystocele    UPPER ARM/ELBOW SURGERY UNLISTED      repair fx of LUE     Family History   Problem Relation Age of Onset    Hypertension Mother     Alcohol and Other Disorders Associated Mother         Cirrhosis    Other (eczema) Mother     Cancer Father         melanoma    Lipids Father     Other (hyperlipidemia) Father     Breast Cancer Paternal Grandmother 55    Heart Disease Paternal Grandfather     Breast Cancer Paternal Aunt 74    Asthma Other         children     Social History     Socioeconomic History    Marital status:    Tobacco Use    Smoking status: Never    Smokeless tobacco: Never   Vaping Use    Vaping Use: Never used   Substance and Sexual Activity    Alcohol use: Yes     Comment: twice per month    Drug use: No    Sexual activity: Yes     Partners: Male   Other Topics Concern    Caffeine Concern Yes     Comment: 2 cups daily    Exercise Yes      Comment: 7 x a week. walk          Current Outpatient Medications   Medication Sig Dispense Refill    metroNIDAZOLE 1 % External Gel Apply 1 Application topically daily. 60 g 0    losartan 50 MG Oral Tab Take 1 tablet (50 mg total) by mouth daily. 90 tablet 0    ALPRAZolam 0.25 MG Oral Tab Take 1 tablet (0.25 mg total) by mouth every 6 (six) hours as needed. 30 tablet 0    cyanocobalamin 1000 MCG Oral Tab Take 1 tablet (1,000 mcg total) by mouth daily.      Cholecalciferol (VITAMIN D) 50 MCG (2000 UT) Oral Cap Take by mouth.      ferrous sulfate 325 (65 FE) MG Oral Tab EC Take 1 tablet (325 mg total) by mouth daily with breakfast.      Calcium Polycarbophil (FIBER) 625 MG Oral Tab Take 1 tablet (625 mg total) by mouth daily as needed.      Fluticasone Propionate 50 MCG/ACT Nasal Suspension 1 spray by Nasal route daily.      loratadine 10 MG Oral Tab Take 1 tablet (10 mg total) by mouth daily.       Allergies:  Allergies   Allergen Reactions    Dust Mites HIVES       Lab Results   Component Value Date    GLU 89 01/19/2024    BUN 23 01/19/2024    BUNCREA 18.6 04/07/2021    CREATSERUM 0.90 01/19/2024    ANIONGAP 8 08/10/2021    GFR 87 11/13/2017    GFRNAA 75 01/19/2024    GFRAA 81 08/10/2021    CA 10.1 01/19/2024    OSMOCALC 288 08/10/2021    ALKPHO 78 08/10/2021    AST 22 01/19/2024    ALT 30 01/19/2024    BILT 0.3 01/19/2024    TP 6.9 01/19/2024    ALB 4.1 08/10/2021    GLOBULIN 3.6 08/10/2021     08/10/2021    K 4.6 01/19/2024     01/19/2024    CO2 22 01/19/2024     Lab Results   Component Value Date    WBC 8.5 01/19/2024    RBC 5.13 08/10/2021    HGB 14.6 01/19/2024    HCT 44.6 01/19/2024    MCV 91 01/19/2024    MCH 28.7 08/10/2021    MCHC 32.0 08/10/2021    RDW 13.1 08/10/2021     01/19/2024     Lab Results   Component Value Date    CHOLEST 192 01/19/2024    TRIG 376 01/19/2024    HDL 43 01/19/2024     (H) 08/10/2021    VLDL 41 (H) 08/10/2021    TCHDLRATIO 4.50 01/19/2024    NONHDLC  155 (H) 08/10/2021     Lab Results   Component Value Date     08/10/2021    A1C 5.5 08/10/2021     Lab Results   Component Value Date    TSH 1.640 08/10/2021     Lab Results   Component Value Date    VITD 31.3 07/24/2023     Lab Results   Component Value Date    OBIE 239.3 07/24/2023     No results found for: \"FOLIC\", \"FOLATESER\", \"FOLATE\"  Lab Results   Component Value Date    IRON 63 07/24/2023     Lab Results   Component Value Date    B12 799 07/24/2023     Lab Results   Component Value Date    PHOS 3.1 02/27/2017     No results found for: \"MG\"     PHYSICAL EXAM:   /78   Pulse 77   Temp 98.1 °F (36.7 °C)   Resp 18   Wt 182 lb (82.6 kg)   LMP 03/16/2015   SpO2 96%   BMI 30.29 kg/m²   Physical Exam  Vitals and nursing note reviewed.   Constitutional:       Appearance: Normal appearance.   Cardiovascular:      Rate and Rhythm: Normal rate and regular rhythm.      Pulses: Normal pulses.   Pulmonary:      Effort: Pulmonary effort is normal. No respiratory distress.      Breath sounds: Normal breath sounds.   Abdominal:      Palpations: Abdomen is soft. There is no mass.      Tenderness: There is abdominal tenderness. There is no guarding or rebound.   Skin:     General: Skin is warm.   Neurological:      Mental Status: She is alert and oriented to person, place, and time.   Psychiatric:         Mood and Affect: Mood normal.              ASSESSMENT/PLAN:   Diagnoses and all orders for this visit:    LLQ pain- check CT   -     CT ABDOMEN+PELVIS(CONTRAST ONLY)(CPT=74177); Future    Ophthalmoplegic migraine, not intractable- wear blue screen glasses and take tylenol/ibuprofen prior to screen time    Essential hypertension, benign- controlled.     Hypertriglyceridemia- decrease intake of cheese, increase physical activity as you can with recent foot surgery       Joanna Oconnor, APRN

## 2024-02-03 DIAGNOSIS — I10 ESSENTIAL HYPERTENSION, BENIGN: ICD-10-CM

## 2024-02-05 RX ORDER — LOSARTAN POTASSIUM 50 MG/1
50 TABLET ORAL DAILY
Qty: 90 TABLET | Refills: 0 | Status: SHIPPED | OUTPATIENT
Start: 2024-02-05

## 2024-03-02 DIAGNOSIS — J40 BRONCHITIS: Primary | ICD-10-CM

## 2024-03-03 RX ORDER — MONTELUKAST SODIUM 10 MG/1
10 TABLET ORAL DAILY
Qty: 90 TABLET | Refills: 3 | Status: SHIPPED | OUTPATIENT
Start: 2024-03-03

## 2024-03-04 DIAGNOSIS — G43.B0 OPHTHALMOPLEGIC MIGRAINE, NOT INTRACTABLE: Primary | ICD-10-CM

## 2024-03-04 RX ORDER — BUTALBITAL, ACETAMINOPHEN AND CAFFEINE 300; 40; 50 MG/1; MG/1; MG/1
1 CAPSULE ORAL EVERY 4 HOURS PRN
Qty: 30 CAPSULE | Refills: 0 | Status: SHIPPED | OUTPATIENT
Start: 2024-03-04

## 2024-03-04 NOTE — TELEPHONE ENCOUNTER
Spoke with Dr. Vanda Mcleod to send one week supply of Fioricet, refer pt to neuro for further evaluation  Rx sent Referral placed  Pt notified

## 2024-03-04 NOTE — TELEPHONE ENCOUNTER
LOV 1/24/24 pt states Joanna MARISCAL had planned on prescribing Fioricet for migraines to use PRN but no rx was sent.  Pt has migraine today and would like rx sent to [facility] on file.  No documentation in visit regarding this.  Pt would like Dr. Dc to send prescription.

## 2024-03-28 ENCOUNTER — OFFICE VISIT (OUTPATIENT)
Dept: INTERNAL MEDICINE CLINIC | Facility: CLINIC | Age: 54
End: 2024-03-28
Payer: COMMERCIAL

## 2024-03-28 VITALS
BODY MASS INDEX: 30.32 KG/M2 | WEIGHT: 182 LBS | OXYGEN SATURATION: 98 % | DIASTOLIC BLOOD PRESSURE: 78 MMHG | HEART RATE: 70 BPM | RESPIRATION RATE: 14 BRPM | SYSTOLIC BLOOD PRESSURE: 124 MMHG | TEMPERATURE: 99 F | HEIGHT: 65 IN

## 2024-03-28 DIAGNOSIS — R05.3 CHRONIC COUGH: ICD-10-CM

## 2024-03-28 DIAGNOSIS — G44.52 NEW DAILY PERSISTENT HEADACHE: ICD-10-CM

## 2024-03-28 DIAGNOSIS — R23.2 FACIAL FLUSHING: Primary | ICD-10-CM

## 2024-03-28 PROCEDURE — 99214 OFFICE O/P EST MOD 30 MIN: CPT | Performed by: NURSE PRACTITIONER

## 2024-03-28 NOTE — PROGRESS NOTES
HPI:    Patient ID: Steffanie Orta is a 53 year old female.    Chief Complaint   Patient presents with    Redness     On Face x 6 months     Headache       Pustles to face improved with metrononazole cream but redness has not. She has been on losartan for years. C/o daily headache, intermittent migraines that fiorcet does help. Has with neuro in May. She has also had a chronic cough for years. She saw allergist has sensitive cough. She was on losartan at that time. She is not taking any OTC supplements with niacin or aspirin. She is not using flonase daily        Review of Systems   Constitutional:         +flushing facial     Respiratory:  Positive for cough (chronic).    Cardiovascular: Negative.    Gastrointestinal: Negative.    Neurological:  Positive for headaches.         Past Medical History:   Diagnosis Date    Abdominal pain, left lower quadrant     Abnormal uterine bleeding     spotting for 3 days    Acute diverticulitis 5/4/2021    Acute pharyngitis     Allergic rhinitis, cause unspecified     Amenorrhea     Mirena    Anxiety state, unspecified     Asthma, mild intermittent (HCC)     Diverticulosis     Esophageal reflux     JARED (generalized anxiety disorder)     GERD (gastroesophageal reflux disease)     H/O mammogram 1-7-2014    benign    Headache(784.0)     High blood pressure     Other abnormal blood chemistry     Other atopic dermatitis and related conditions     Other malaise and fatigue     Pap smear for cervical cancer screening 1-    wnl pt stated    PONV (postoperative nausea and vomiting)     Unspecified constipation     Unspecified essential hypertension     Uterine fibroid     Visual impairment     glasses/contacts     Past Surgical History:   Procedure Laterality Date    CHOLECYSTECTOMY  1998    COLECTOMY  06/30/2021    Dr. White ROBOTIC LOW ANTERIOR COLON RESECTION OF RECTOSIGMOID COLON, POSSIBLE TRANSVERSE COLON SEGMENTAL    COLONOSCOPY      COLONOSCOPY  09/2017    hp polyp,  diverticulosis. repeat 10 year    COLONOSCOPY N/A 2017    Procedure: COLONOSCOPY, POSSIBLE BIOPSY, POSSIBLE POLYPECTOMY 84787;  Surgeon: Abraham Pelletier MD;  Location: [facility]    HYSTERECTOMY  2015    total hysterectomy due to myomas. intact ovaries.    MIRENA, IUD  2013    OTHER SURGICAL HISTORY      elective     OTHER SURGICAL HISTORY  2021    Cysto (Dr. Sheth)    SLING OPER STRES INCONTINENCE      TVT taping of cystocele    UPPER ARM/ELBOW SURGERY UNLISTED      repair fx of LUE     Family History   Problem Relation Age of Onset    Hypertension Mother     Alcohol and Other Disorders Associated Mother         Cirrhosis    Other (eczema) Mother     Cancer Father         melanoma    Lipids Father     Other (hyperlipidemia) Father     Breast Cancer Paternal Grandmother 55    Heart Disease Paternal Grandfather     Breast Cancer Paternal Aunt 74    Asthma Other         children     Social History     Socioeconomic History    Marital status:    Tobacco Use    Smoking status: Never    Smokeless tobacco: Never   Vaping Use    Vaping Use: Never used   Substance and Sexual Activity    Alcohol use: Yes     Comment: twice per month    Drug use: No    Sexual activity: Yes     Partners: Male   Other Topics Concern    Caffeine Concern Yes     Comment: 2 cups daily    Exercise Yes     Comment: 7 x a week. walk          Current Outpatient Medications   Medication Sig Dispense Refill    Butalbital-APAP-Caffeine -40 MG Oral Cap Take 1 capsule by mouth every 4 (four) hours as needed for Pain. 30 capsule 0    MONTELUKAST 10 MG Oral Tab TAKE 1 TABLET(10 MG) BY MOUTH DAILY 90 tablet 3    LOSARTAN 50 MG Oral Tab TAKE 1 TABLET(50 MG) BY MOUTH DAILY 90 tablet 0    metroNIDAZOLE 1 % External Gel Apply 1 Application topically daily. 60 g 0    ALPRAZolam 0.25 MG Oral Tab Take 1 tablet (0.25 mg total) by mouth every 6 (six) hours as needed. 30 tablet 0    cyanocobalamin 1000 MCG Oral Tab  Take 1 tablet (1,000 mcg total) by mouth daily.      Cholecalciferol (VITAMIN D) 50 MCG (2000 UT) Oral Cap Take by mouth.      ferrous sulfate 325 (65 FE) MG Oral Tab EC Take 1 tablet (325 mg total) by mouth daily with breakfast.      Calcium Polycarbophil (FIBER) 625 MG Oral Tab Take 1 tablet (625 mg total) by mouth daily as needed.      Fluticasone Propionate 50 MCG/ACT Nasal Suspension 1 spray by Nasal route daily.      loratadine 10 MG Oral Tab Take 1 tablet (10 mg total) by mouth daily.       Allergies:  Allergies   Allergen Reactions    Dust Mites HIVES       Lab Results   Component Value Date    GLU 89 01/19/2024    BUN 23 01/19/2024    BUNCREA 18.6 04/07/2021    CREATSERUM 0.90 01/19/2024    ANIONGAP 8 08/10/2021    GFR 87 11/13/2017    GFRNAA 75 01/19/2024    GFRAA 81 08/10/2021    CA 10.1 01/19/2024    OSMOCALC 288 08/10/2021    ALKPHO 78 08/10/2021    AST 22 01/19/2024    ALT 30 01/19/2024    BILT 0.3 01/19/2024    TP 6.9 01/19/2024    ALB 4.1 08/10/2021    GLOBULIN 3.6 08/10/2021     08/10/2021    K 4.6 01/19/2024     01/19/2024    CO2 22 01/19/2024     Lab Results   Component Value Date    WBC 8.5 01/19/2024    RBC 5.13 08/10/2021    HGB 14.6 01/19/2024    HCT 44.6 01/19/2024    MCV 91 01/19/2024    MCH 28.7 08/10/2021    MCHC 32.0 08/10/2021    RDW 13.1 08/10/2021     01/19/2024     Lab Results   Component Value Date    CHOLEST 192 01/19/2024    TRIG 376 01/19/2024    HDL 43 01/19/2024     (H) 08/10/2021    VLDL 41 (H) 08/10/2021    TCHDLRATIO 4.50 01/19/2024    NONHDLC 155 (H) 08/10/2021     Lab Results   Component Value Date     08/10/2021    A1C 5.5 08/10/2021     Lab Results   Component Value Date    TSH 1.640 08/10/2021     Lab Results   Component Value Date    VITD 31.3 07/24/2023     Lab Results   Component Value Date    OBIE 239.3 07/24/2023     No results found for: \"FOLIC\", \"FOLATESER\", \"FOLATE\"  Lab Results   Component Value Date    IRON 63 07/24/2023     Lab  Results   Component Value Date    B12 799 07/24/2023     Lab Results   Component Value Date    PHOS 3.1 02/27/2017     No results found for: \"MG\"     PHYSICAL EXAM:   /78   Pulse 70   Temp 98.7 °F (37.1 °C)   Resp 14   Ht 5' 5\" (1.651 m)   Wt 182 lb (82.6 kg)   LMP 03/16/2015   SpO2 98%   BMI 30.29 kg/m²   Physical Exam  Vitals and nursing note reviewed.   Constitutional:       Appearance: Normal appearance.   HENT:      Head:        Comments: Flushing    Cardiovascular:      Rate and Rhythm: Normal rate.   Pulmonary:      Effort: Pulmonary effort is normal.   Neurological:      Mental Status: She is alert.              ASSESSMENT/PLAN:   Diagnoses and all orders for this visit:    Facial flushing    Chronic cough    New daily persistent headache    Hold losartan for 3 days, monitor symptoms, notify me with update. Increase water to at least 60 oz daily  If headache continues follow up with neuro    Joanna Oconnor, APRN

## 2024-05-08 ENCOUNTER — OFFICE VISIT (OUTPATIENT)
Dept: INTERNAL MEDICINE CLINIC | Facility: CLINIC | Age: 54
End: 2024-05-08
Payer: COMMERCIAL

## 2024-05-08 VITALS
HEART RATE: 98 BPM | OXYGEN SATURATION: 98 % | RESPIRATION RATE: 18 BRPM | SYSTOLIC BLOOD PRESSURE: 136 MMHG | WEIGHT: 180 LBS | DIASTOLIC BLOOD PRESSURE: 84 MMHG | TEMPERATURE: 98 F | HEIGHT: 65 IN | BODY MASS INDEX: 29.99 KG/M2

## 2024-05-08 DIAGNOSIS — N89.8 VAGINAL WALL CYST: Primary | ICD-10-CM

## 2024-05-08 DIAGNOSIS — J30.89 ENVIRONMENTAL AND SEASONAL ALLERGIES: ICD-10-CM

## 2024-05-08 PROCEDURE — G2211 COMPLEX E/M VISIT ADD ON: HCPCS | Performed by: NURSE PRACTITIONER

## 2024-05-08 PROCEDURE — 99214 OFFICE O/P EST MOD 30 MIN: CPT | Performed by: NURSE PRACTITIONER

## 2024-05-08 RX ORDER — IVERMECTIN 10 MG/G
CREAM TOPICAL
COMMUNITY
Start: 2024-04-04

## 2024-05-08 NOTE — PROGRESS NOTES
HPI:    Patient ID: Steffanie Orta is a 53 year old female.    Chief Complaint   Patient presents with    Derm Problem     Lumps near anus x6 days       4th occurrence of lump between rectum and vagina. Increased PND with allergies season          Review of Systems   Constitutional: Negative.    HENT:  Positive for postnasal drip.    Respiratory: Negative.     Cardiovascular: Negative.    Genitourinary:         Vaginal lump           Past Medical History:    Abdominal pain, left lower quadrant    Abnormal uterine bleeding    spotting for 3 days    Acute diverticulitis    Acute pharyngitis    Allergic rhinitis, cause unspecified    Amenorrhea    Mirena    Anxiety state, unspecified    Asthma, mild intermittent (HCC)    Diverticulosis    Esophageal reflux    JARED (generalized anxiety disorder)    GERD (gastroesophageal reflux disease)    H/O mammogram    benign    Headache(784.0)    High blood pressure    Other abnormal blood chemistry    Other atopic dermatitis and related conditions    Other malaise and fatigue    Pap smear for cervical cancer screening    wnl pt stated    PONV (postoperative nausea and vomiting)    Unspecified constipation    Unspecified essential hypertension    Uterine fibroid    Visual impairment    glasses/contacts     Past Surgical History:   Procedure Laterality Date    Cholecystectomy  1998    Colectomy  2021    Dr. White ROBOTIC LOW ANTERIOR COLON RESECTION OF RECTOSIGMOID COLON, POSSIBLE TRANSVERSE COLON SEGMENTAL    Colonoscopy      Colonoscopy  2017    hp polyp, diverticulosis. repeat 10 year    Colonoscopy N/A 2017    Procedure: COLONOSCOPY, POSSIBLE BIOPSY, POSSIBLE POLYPECTOMY 79456;  Surgeon: Abraham Pelletier MD;  Location: [facility]    Hysterectomy  2015    total hysterectomy due to myomas. intact ovaries.    Mirena, iud  2013    Other surgical history      elective     Other surgical history  2021    Cysto (Dr. Sheth)    Sling oper  stres incontinence  1999    TVT taping of cystocele    Upper arm/elbow surgery unlisted      repair fx of LUE     Family History   Problem Relation Age of Onset    Hypertension Mother     Alcohol and Other Disorders Associated Mother         Cirrhosis    Other (eczema) Mother     Cancer Father         melanoma    Lipids Father     Other (hyperlipidemia) Father     Breast Cancer Paternal Grandmother 55    Heart Disease Paternal Grandfather     Breast Cancer Paternal Aunt 74    Asthma Other         children     Social History     Socioeconomic History    Marital status:    Tobacco Use    Smoking status: Never    Smokeless tobacco: Never   Vaping Use    Vaping status: Never Used   Substance and Sexual Activity    Alcohol use: Yes     Comment: twice per month    Drug use: No    Sexual activity: Yes     Partners: Male   Other Topics Concern    Caffeine Concern Yes     Comment: 2 cups daily    Exercise Yes     Comment: 7 x a week. walk          Current Outpatient Medications   Medication Sig Dispense Refill    SOOLANTRA 1 % External Cream APPLY DAILY A PEA SIZED AMOUNT TOPICALLY TO COVER AREAS OF FACE WITH THIN LAYER AVOIDING THE EYES AND LIPS      MONTELUKAST 10 MG Oral Tab TAKE 1 TABLET(10 MG) BY MOUTH DAILY 90 tablet 3    LOSARTAN 50 MG Oral Tab TAKE 1 TABLET(50 MG) BY MOUTH DAILY 90 tablet 0    ALPRAZolam 0.25 MG Oral Tab Take 1 tablet (0.25 mg total) by mouth every 6 (six) hours as needed. 30 tablet 0    cyanocobalamin 1000 MCG Oral Tab Take 1 tablet (1,000 mcg total) by mouth daily.      Cholecalciferol (VITAMIN D) 50 MCG (2000 UT) Oral Cap Take by mouth.      ferrous sulfate 325 (65 FE) MG Oral Tab EC Take 1 tablet (325 mg total) by mouth daily with breakfast.      Calcium Polycarbophil (FIBER) 625 MG Oral Tab Take 1 tablet (625 mg total) by mouth daily as needed.      Fluticasone Propionate 50 MCG/ACT Nasal Suspension 1 spray by Nasal route daily.      loratadine 10 MG Oral Tab Take 1 tablet (10 mg total)  by mouth daily.      Butalbital-APAP-Caffeine -40 MG Oral Cap Take 1 capsule by mouth every 4 (four) hours as needed for Pain. (Patient not taking: Reported on 5/8/2024) 30 capsule 0     Allergies:  Allergies   Allergen Reactions    Dust Mites HIVES       Lab Results   Component Value Date    GLU 89 01/19/2024    BUN 23 01/19/2024    BUNCREA 18.6 04/07/2021    CREATSERUM 0.90 01/19/2024    ANIONGAP 8 08/10/2021    GFR 87 11/13/2017    GFRNAA 75 01/19/2024    GFRAA 81 08/10/2021    CA 10.1 01/19/2024    OSMOCALC 288 08/10/2021    ALKPHO 78 08/10/2021    AST 22 01/19/2024    ALT 30 01/19/2024    BILT 0.3 01/19/2024    TP 6.9 01/19/2024    ALB 4.1 08/10/2021    GLOBULIN 3.6 08/10/2021     08/10/2021    K 4.6 01/19/2024     01/19/2024    CO2 22 01/19/2024     Lab Results   Component Value Date    WBC 8.5 01/19/2024    RBC 5.13 08/10/2021    HGB 14.6 01/19/2024    HCT 44.6 01/19/2024    MCV 91 01/19/2024    MCH 28.7 08/10/2021    MCHC 32.0 08/10/2021    RDW 13.1 08/10/2021     01/19/2024     Lab Results   Component Value Date    CHOLEST 192 01/19/2024    TRIG 376 01/19/2024    HDL 43 01/19/2024     (H) 08/10/2021    VLDL 41 (H) 08/10/2021    TCHDLRATIO 4.50 01/19/2024    NONHDLC 155 (H) 08/10/2021     Lab Results   Component Value Date     08/10/2021    A1C 5.5 08/10/2021     Lab Results   Component Value Date    TSH 1.640 08/10/2021     Lab Results   Component Value Date    VITD 31.3 07/24/2023     Lab Results   Component Value Date    OBIE 239.3 07/24/2023     No results found for: \"FOLIC\", \"FOLATESER\", \"FOLATE\"  Lab Results   Component Value Date    IRON 63 07/24/2023     Lab Results   Component Value Date    B12 799 07/24/2023     Lab Results   Component Value Date    PHOS 3.1 02/27/2017     No results found for: \"MG\"     PHYSICAL EXAM:   /84   Pulse 98   Temp 98.1 °F (36.7 °C) (Oral)   Resp 18   Ht 5' 5\" (1.651 m)   Wt 180 lb (81.6 kg)   LMP 03/16/2015   SpO2 98%    BMI 29.95 kg/m²   Physical Exam  Vitals and nursing note reviewed.   Constitutional:       Appearance: Normal appearance.   Cardiovascular:      Rate and Rhythm: Normal rate.   Pulmonary:      Effort: Pulmonary effort is normal.   Genitourinary:         Comments: Non infection cyst without erythema, tenderness or drainage  Neurological:      Mental Status: She is alert and oriented to person, place, and time.              ASSESSMENT/PLAN:   Diagnoses and all orders for this visit:    Vaginal wall cyst- ice pack prn for pain, monitor for s/s of infection    Environmental and seasonal allergies- increase claritin bid, continue nasal spray and Singulair.       Joanna Oconnor, APRN

## 2024-06-12 DIAGNOSIS — I10 ESSENTIAL HYPERTENSION, BENIGN: ICD-10-CM

## 2024-06-12 RX ORDER — LOSARTAN POTASSIUM 50 MG/1
50 TABLET ORAL DAILY
Qty: 90 TABLET | Refills: 0 | Status: SHIPPED | OUTPATIENT
Start: 2024-06-12

## 2024-06-12 NOTE — TELEPHONE ENCOUNTER
Last time medication was refilled 02/05/2024  Last office visit  05/08/2024  Next office visit due/scheduled No future appointments.    Passed protocol, Medication sent.

## 2024-06-13 ENCOUNTER — OFFICE VISIT (OUTPATIENT)
Dept: FAMILY MEDICINE CLINIC | Facility: CLINIC | Age: 54
End: 2024-06-13
Payer: COMMERCIAL

## 2024-06-13 VITALS
HEIGHT: 65 IN | BODY MASS INDEX: 29.99 KG/M2 | OXYGEN SATURATION: 100 % | SYSTOLIC BLOOD PRESSURE: 114 MMHG | WEIGHT: 180 LBS | DIASTOLIC BLOOD PRESSURE: 88 MMHG | TEMPERATURE: 99 F | HEART RATE: 78 BPM | RESPIRATION RATE: 18 BRPM

## 2024-06-13 DIAGNOSIS — R30.0 DYSURIA: ICD-10-CM

## 2024-06-13 DIAGNOSIS — R39.9 UTI SYMPTOMS: Primary | ICD-10-CM

## 2024-06-13 PROCEDURE — 87186 SC STD MICRODIL/AGAR DIL: CPT | Performed by: PHYSICIAN ASSISTANT

## 2024-06-13 PROCEDURE — 87086 URINE CULTURE/COLONY COUNT: CPT | Performed by: PHYSICIAN ASSISTANT

## 2024-06-13 PROCEDURE — 87077 CULTURE AEROBIC IDENTIFY: CPT | Performed by: PHYSICIAN ASSISTANT

## 2024-06-13 PROCEDURE — 99213 OFFICE O/P EST LOW 20 MIN: CPT | Performed by: PHYSICIAN ASSISTANT

## 2024-06-13 RX ORDER — NITROFURANTOIN 25; 75 MG/1; MG/1
100 CAPSULE ORAL 2 TIMES DAILY
Qty: 10 CAPSULE | Refills: 0 | Status: SHIPPED | OUTPATIENT
Start: 2024-06-13 | End: 2024-06-18

## 2024-06-13 NOTE — PATIENT INSTRUCTIONS
Urine culture pending, results in 24-72 hours.    Macrobid 100 mg twice daily for 5 days.       Follow up with your primary care provider if your symptoms fail to improve and resolve as anticipated    Go to the Immediate Care or Emergency Department in event of new or worsening symptoms at any time

## 2024-06-13 NOTE — PROGRESS NOTES
CHIEF COMPLAINT:     Chief Complaint   Patient presents with    Urinary Symptoms     Symptoms started on Sunday : Urgency and pain while urinating   OTC: AZZO   Right ear clogged. For a month        HPI:   Steffanie Orta is a 53 year old female who presents with symptoms of UTI. Patient reporting symptoms of dysuria with urgency/frequency x 4 days.  Improved with OTC azo, last dose this morning.  Prior h/o UTIs in past with similar symptoms   Denies fever, no chills/sweats, no flank pain, no hematuria, no vaginal discharge or bleeding, no abd pain.   Denies STI concerns.       Current Outpatient Medications   Medication Sig Dispense Refill    nitrofurantoin monohydrate macro 100 MG Oral Cap Take 1 capsule (100 mg total) by mouth 2 (two) times daily for 5 days. 10 capsule 0    losartan 50 MG Oral Tab Take 1 tablet (50 mg total) by mouth daily. 90 tablet 0    SOOLANTRA 1 % External Cream APPLY DAILY A PEA SIZED AMOUNT TOPICALLY TO COVER AREAS OF FACE WITH THIN LAYER AVOIDING THE EYES AND LIPS      Butalbital-APAP-Caffeine -40 MG Oral Cap Take 1 capsule by mouth every 4 (four) hours as needed for Pain. (Patient not taking: Reported on 5/8/2024) 30 capsule 0    MONTELUKAST 10 MG Oral Tab TAKE 1 TABLET(10 MG) BY MOUTH DAILY 90 tablet 3    ALPRAZolam 0.25 MG Oral Tab Take 1 tablet (0.25 mg total) by mouth every 6 (six) hours as needed. 30 tablet 0    cyanocobalamin 1000 MCG Oral Tab Take 1 tablet (1,000 mcg total) by mouth daily.      Cholecalciferol (VITAMIN D) 50 MCG (2000 UT) Oral Cap Take by mouth.      ferrous sulfate 325 (65 FE) MG Oral Tab EC Take 1 tablet (325 mg total) by mouth daily with breakfast.      Calcium Polycarbophil (FIBER) 625 MG Oral Tab Take 1 tablet (625 mg total) by mouth daily as needed.      Fluticasone Propionate 50 MCG/ACT Nasal Suspension 1 spray by Nasal route daily.      loratadine 10 MG Oral Tab Take 1 tablet (10 mg total) by mouth daily.        Past Medical History:     Abdominal pain, left lower quadrant    Abnormal uterine bleeding    spotting for 3 days    Acute diverticulitis    Acute pharyngitis    Allergic rhinitis, cause unspecified    Amenorrhea    Mirena    Anxiety state, unspecified    Asthma, mild intermittent (HCC)    Diverticulosis    Esophageal reflux    JARED (generalized anxiety disorder)    GERD (gastroesophageal reflux disease)    H/O mammogram    benign    Headache(784.0)    High blood pressure    Other abnormal blood chemistry    Other atopic dermatitis and related conditions    Other malaise and fatigue    Pap smear for cervical cancer screening    wnl pt stated    PONV (postoperative nausea and vomiting)    Unspecified constipation    Unspecified essential hypertension    Uterine fibroid    Visual impairment    glasses/contacts      Social History:  Social History     Socioeconomic History    Marital status:    Tobacco Use    Smoking status: Never    Smokeless tobacco: Never   Vaping Use    Vaping status: Never Used   Substance and Sexual Activity    Alcohol use: Yes     Comment: twice per month    Drug use: No    Sexual activity: Yes     Partners: Male   Other Topics Concern    Caffeine Concern Yes     Comment: 2 cups daily    Exercise Yes     Comment: 7 x a week. walk         REVIEW OF SYSTEMS:   GENERAL: See above  GI: See HPI.    : See HPI.      EXAM:   /88 (BP Location: Left arm, Patient Position: Sitting, Cuff Size: adult)   Pulse 78   Temp 98.7 °F (37.1 °C) (Temporal)   Resp 18   Ht 5' 5\" (1.651 m)   Wt 180 lb (81.6 kg)   LMP 03/16/2015   SpO2 100%   BMI 29.95 kg/m²   GENERAL: well developed, well nourished,in no apparent distress  HEENT  NCAT, EOMI, ext ears/TMs clear, ext nares clear.   CARDIO: RRR, no murmurs  LUNGS: clear to ausculation bilaterally, no wheezing or rhonchi  ABD (+)BS, soft, ntnd, no masses, no g/r/r, no organomegaly, no CVAT.   MS  GAONA, no c/c/e.       Dip deferred given use OTC Azo.       ASSESSMENT AND PLAN:    Steffanie Orta is a 53 year old female presents with urinary symptoms.    ASSESSMENT:  Encounter Diagnoses   Name Primary?    UTI symptoms Yes    Dysuria        PLAN:    Dip deferred given use of Azo.  Urine cx pending.    Macrobid 100 mg twice daily for 5 days, further tx pending results of culture.       Meds & Refills for this Visit:  Requested Prescriptions     Signed Prescriptions Disp Refills    nitrofurantoin monohydrate macro 100 MG Oral Cap 10 capsule 0     Sig: Take 1 capsule (100 mg total) by mouth 2 (two) times daily for 5 days.       Risk and benefits of medication discussed.   Stressed importance of completing full course of antibiotic unless told otherwise.     The patient indicates understanding of these issues and agrees to the plan.  The patient is asked to see PCP in 3 days if not better. Seek care immediately for new onset of fever, vomiting, worsening symptoms.    Patient Instructions    Urine culture pending, results in 24-72 hours.    Macrobid 100 mg twice daily for 5 days.       Follow up with your primary care provider if your symptoms fail to improve and resolve as anticipated    Go to the Immediate Care or Emergency Department in event of new or worsening symptoms at any time       Roxi Garcia PA-C

## 2024-06-21 ENCOUNTER — TELEPHONE (OUTPATIENT)
Dept: INTERNAL MEDICINE CLINIC | Facility: CLINIC | Age: 54
End: 2024-06-21

## 2024-06-21 DIAGNOSIS — N30.90 CYSTITIS: Primary | ICD-10-CM

## 2024-06-21 RX ORDER — CIPROFLOXACIN 500 MG/1
500 TABLET, FILM COATED ORAL 2 TIMES DAILY
Qty: 14 TABLET | Refills: 0 | Status: SHIPPED | OUTPATIENT
Start: 2024-06-21 | End: 2024-06-28

## 2024-06-21 NOTE — TELEPHONE ENCOUNTER
PATIENT SEEN IN URGENT CARE FOR UTI     PATIENT ISNT BETTER AFTER MEDS    HAS QUESTIONS IF MEDS WERE REALLY FOR THE EXACT STRAIN OF BACTERIA SHE HAS???    WANTS TO KNOW WHAT TO DO NEXT    PLEASE ADVISE

## 2024-06-21 NOTE — TELEPHONE ENCOUNTER
nitrofurantoin monohydrate macro 100 MG Oral Cap () 10 capsule 0 2024   Sig:   Take 1 capsule (100 mg total) by mouth 2 (two) times daily for 5 days.       URINE CULTURE 10,000 - 50,000 CFU/ML Citrobacter braakii Abnormal         Nitrofurantoin <=16 Sensitive     Per Dr. Dc cipro 500mg BID x 7 days. Discussed with patient and prescription sent.

## 2024-07-24 ENCOUNTER — LABORATORY ENCOUNTER (OUTPATIENT)
Dept: LAB | Age: 54
End: 2024-07-24
Attending: NURSE PRACTITIONER
Payer: COMMERCIAL

## 2024-07-24 DIAGNOSIS — R30.0 DYSURIA: ICD-10-CM

## 2024-07-24 LAB
BILIRUB UR QL STRIP.AUTO: NEGATIVE
COLOR UR AUTO: YELLOW
GLUCOSE UR STRIP.AUTO-MCNC: NORMAL MG/DL
KETONES UR STRIP.AUTO-MCNC: NEGATIVE MG/DL
LEUKOCYTE ESTERASE UR QL STRIP.AUTO: 500
NITRITE UR QL STRIP.AUTO: NEGATIVE
PH UR STRIP.AUTO: 5.5 [PH] (ref 5–8)
PROT UR STRIP.AUTO-MCNC: NEGATIVE MG/DL
SP GR UR STRIP.AUTO: 1.01 (ref 1–1.03)
UROBILINOGEN UR STRIP.AUTO-MCNC: NORMAL MG/DL
WBC #/AREA URNS AUTO: >50 /HPF
WBC CLUMPS UR QL AUTO: PRESENT /HPF

## 2024-07-24 PROCEDURE — 81001 URINALYSIS AUTO W/SCOPE: CPT

## 2024-07-24 PROCEDURE — 87086 URINE CULTURE/COLONY COUNT: CPT

## 2024-07-25 DIAGNOSIS — N30.90 CYSTITIS: Primary | ICD-10-CM

## 2024-07-25 RX ORDER — CIPROFLOXACIN 250 MG/1
250 TABLET, FILM COATED ORAL 2 TIMES DAILY
Qty: 14 TABLET | Refills: 0 | Status: SHIPPED | OUTPATIENT
Start: 2024-07-25 | End: 2024-08-01

## 2024-08-07 ENCOUNTER — OFFICE VISIT (OUTPATIENT)
Dept: SURGERY | Facility: CLINIC | Age: 54
End: 2024-08-07
Payer: COMMERCIAL

## 2024-08-07 DIAGNOSIS — R82.90 URINE FINDING: ICD-10-CM

## 2024-08-07 DIAGNOSIS — N39.0 RECURRENT UTI: Primary | ICD-10-CM

## 2024-08-07 LAB
APPEARANCE: CLEAR
BILIRUBIN: NEGATIVE
GLUCOSE (URINE DIPSTICK): NEGATIVE MG/DL
KETONES (URINE DIPSTICK): NEGATIVE MG/DL
LEUKOCYTES: NEGATIVE
MULTISTIX LOT#: NORMAL NUMERIC
NITRITE, URINE: NEGATIVE
OCCULT BLOOD: NEGATIVE
PH, URINE: 5.5 (ref 4.5–8)
PROTEIN (URINE DIPSTICK): NEGATIVE MG/DL
SPECIFIC GRAVITY: 1.02 (ref 1–1.03)
URINE-COLOR: YELLOW
UROBILINOGEN,SEMI-QN: 0.2 MG/DL (ref 0–1.9)

## 2024-08-07 PROCEDURE — 99204 OFFICE O/P NEW MOD 45 MIN: CPT | Performed by: PHYSICIAN ASSISTANT

## 2024-08-07 PROCEDURE — 51798 US URINE CAPACITY MEASURE: CPT | Performed by: PHYSICIAN ASSISTANT

## 2024-08-07 PROCEDURE — 81003 URINALYSIS AUTO W/O SCOPE: CPT | Performed by: PHYSICIAN ASSISTANT

## 2024-08-07 NOTE — PATIENT INSTRUCTIONS
Urinary tract infections can affect your general health and your quality of life.  Some UTI’s can be prevented.  Here are some suggestions that my help prevent frequent UTI’s.     Good Hygiene: Always wipe front to back.  Don’t use perfumed soaps.  Plain soaps like Ivory are the best.  Don’t use washcloths.  Place soap directly on your hands and clean the genital area.  Rinse thoroughly.  Soap can be very irritating to the vaginal mucosa.     Urination: Urinate every 2-3 hours during the day.  Empty your bladder just before going to bed and  first thing when you wake up.  Make sure you go to the bathroom when you have a strong urge. Don't hover over the toilet to urinate.  The bladder may not be completely emptying when using this technique.  Sometimes you may need to \"double-void\".  After you finish urinating, stand up, then sit back down to urinate again.     Clothing: Wear cotton underwear. Change daily.  Avoid tight jeans.       [place]: Sometimes UTI’s are related to intercourse.  Wash genital area before and rinse afterwards.  Empty your bladder before and after intercourse.  Use of vaginal lubricants may be helpful.  Condoms and some lubricants may be irritating to the vaginal mucosa.  Spermicide should be avoided.  Talk to your doctor, you may require a suppressive antibiotic to take after intercourse.     Supplements: Take Vitamin C 500 or 1000mg daily.  Cranberry pills 400mg daily or if symptomatic 400mg two times per day.  Eat yogurt or consider taking a probiotic.  D-mannose 1 gram is another supplement that can help prevent infections.  This one is harder to find, but can be found at stores such as [employer], [employer], or a specialized vitamin store.  Fluid intake should be at least 48oz daily with the goal of 64oz daily.  Water is preferable.      Constipation: Constipation has been linked to UTI’s.  You should be having a soft BM daily.  Dietary fiber should be between 20-25 grams daily.   Flaxseed, All-Bran cereal, Fiber One bars, fruits and vegetables are a good sources of fiber.  Alternatively, Metamucil can be used daily.  Gentle laxatives and stool softeners may be added on a daily or as needed basis if necessary (Miralax, Colace, Pericolace).      Vaginal creams and moisturizers: It may be recommended you try vaginal creams, moisturizers or lubricants as a prevention method.  Over-the-Counter products:  Replens:  1 applicator three times per week  Luvena prebiotic vaginal moisturizer and lubricant:  package of 6.  1 tube every three days at bedtime.  Helps restore pH balance, decrease vaginal dryness, odor and itchiness.  KY liquibeads  KY silk - moisture enhancer  MeAgain Vaginal Moisturizer.  8 per package.  Use 1 daily for 7 days then 1 daily two times per week.  Astroglide  Prescriptions:  Estrace Cream  Premarin Cream  E-string     Prescription medications: Your doctor may recommend daily or as needed prescription medications including antibiotics to prevent urinary tract infection as well.    Dietary Irritants    What you can do to help relieve your symptoms:    The purpose of this handout is to provide information that can help you discover what you are ingesting or doing that may be contributing to your recurrent irritative voiding symptoms and what steps you can take to relieve your discomfort.     Frequency, urgency, and pain on urination are symptoms of inflammation or irritation. These symptoms can be caused by bacterial infections or substances in the urine causing irritation to the lining of the bladder or urethra. Bacterial infections can be treated with antibiotics. But irritation of the bladder or urethra can results from other cause that will probably not respond to antibiotics.    What to do at the first sign of bladder or urethral discomfort:    The basic treatment for irritable bladder symptoms, whether induced by bacterial or nonbacterial irritants, is hydration. At the  first sign of discomfort, start drinking 16 oz of water mixed with 1 teaspoon of baking soda. Then drink 8 oz of plain water every 20 minutes for the next 2-3 hours.     Repeat drinking 16 oz of water with baking soda in 3-4 hours. (Baking soda contains sodium and can cause fluid retention. If you have a history of high blood pressure, congestive heart failure, or renal disease, you should not use more than twice in 24 hours.)    You can take acetaminophen to relieve the pain (two extra strength or three regular strength tablets). Bladder analgesics such as phenazopyridine (Pyridium) may help and will not alter the results of a urine culture should the symptoms not be significantly relieved by diluting the urine and flushing out the bladder.     A warm bath to help muscles of the pelvic floor relax will also help lessen discomfort.     Before starting any antibiotic, a urine culture must be taken. If the conservative measures mentioned above are not helping, call the office to arrange for a urine culture. If the specimen is contaminated with vaginal cells and bacteria and you are severely symptomatic, then a catheterized specimen should be obtained directly from your bladder to determine precisely whether bacterial infection is the cause of your symptoms.     Dietary irritants to the urinary tract to avoid:    Certain food can contribute to urinary frequency, urgency, and discomfort. If bladder symptoms are related to dietary factors, strict adherence to a diet that eliminated the food should bring significant relief in 10 days. Once you are feeling better, you can begin to add foods back into your diet one at a time. If the symptoms return, you will be able to identify the irritant. As you add foods back to your diet it is very important that you drink significant amounts of water. These foods are acidic and are considered irritants to the bladder. They should be avoided.    Alcoholic beverages  Apples and apple  juice  Cantaloupe  Carbonated beverages  Chili and spicy foods  Citrus fruit  Chocolate  Coffee (including decaf)  Cranberries and juice  Grapes  Guava  Peaches  Pineapple  Plums  Strawberries  Sugar*  Tea  Tomatoes  Vit B Complex  Vinegar  *Some women report that sugar flares their symptoms.    Low acid fruit substitutions include apricots, papaya, pears, and watermelon. Coffee drinkers can drink Kava or other low-acid instant drinks. Tea drinks can substitute non-citrus herbal and sun brewed teas. Calcium carbonate co-buffered with calcium ascorbate can be substituted for Vitamin C.

## 2024-08-07 NOTE — PROGRESS NOTES
[facility], [facility]    Urology Consult Note    History of Present Illness:   Patient is a 54 year old female with hx of HTN, migraines, RLS, anxiety, GERD, OLE, who presents today for consultation for recurrent cystitis.     Recurrent infections for approximately 3 years ago. Had cystoscopy in May 2021 with Dr. Figueroa Sheth that showed inflammation but no clear fistula. Following colon resection in June 2021 with what seemed like resolution of cystitis episodes for long period of time.    Currently has been taking AZO with relief of symptoms.    Fluids 64 oz of water daily on a good day  Does notice symptoms worse with alcohol  Bowels irregular  Hysterectomy in 2018, prior sling 25 years.  No vaginal dryness or pain with intercourse  Good hygiene    Patient notes that she has been experiencing intermittent bladder pain with associated malodorous urine, urinary urgency, and frequency for approximately one year. Denies any associated dysuria, gross hematuria. Recent urine cultures have been negative.     UA today negative, PVR 0mL    Urinalysis 7/24/24 >50 WBC/hpf, 3+ bacteria  Urine culture <10K gram neg margaret -> took Cipro for 24 hours.   Urine culture 10-50K Citrobacter R Keflex    CT a/p 1/24/24 negative stones, s/p hysterectomy    HISTORY:  Past Medical History:    Abdominal pain, left lower quadrant    Abnormal uterine bleeding    spotting for 3 days    Acute diverticulitis    Acute pharyngitis    Allergic rhinitis, cause unspecified    Amenorrhea    Mirena    Anxiety state, unspecified    Asthma, mild intermittent (HCC)    Diverticulosis    Esophageal reflux    JARED (generalized anxiety disorder)    GERD (gastroesophageal reflux disease)    H/O mammogram    benign    Headache(784.0)    High blood pressure    Other abnormal blood chemistry    Other atopic dermatitis and related conditions    Other malaise and fatigue    Pap smear for cervical cancer screening    wnl pt stated     PONV (postoperative nausea and vomiting)    Unspecified constipation    Unspecified essential hypertension    Uterine fibroid    Visual impairment    glasses/contacts      Past Surgical History:   Procedure Laterality Date    Cholecystectomy      Colectomy  2021    Dr. White ROBOTIC LOW ANTERIOR COLON RESECTION OF RECTOSIGMOID COLON, POSSIBLE TRANSVERSE COLON SEGMENTAL    Colonoscopy      Colonoscopy  2017    hp polyp, diverticulosis. repeat 10 year    Colonoscopy N/A 2017    Procedure: COLONOSCOPY, POSSIBLE BIOPSY, POSSIBLE POLYPECTOMY 02632;  Surgeon: Abraham Pelletier MD;  Location: [facility]    Hysterectomy  2015    total hysterectomy due to myomas. intact ovaries.    Mirena, iud  2013    Other surgical history      elective     Other surgical history  2021    Cysto (Dr. Sheth)    Sling oper stres incontinence      TVT taping of cystocele    Upper arm/elbow surgery unlisted      repair fx of LUE      Family History   Problem Relation Age of Onset    Hypertension Mother     Alcohol and Other Disorders Associated Mother         Cirrhosis    Other (eczema) Mother     Cancer Father         melanoma    Lipids Father     Other (hyperlipidemia) Father     Breast Cancer Paternal Grandmother 55    Heart Disease Paternal Grandfather     Breast Cancer Paternal Aunt 74    Asthma Other         children      Social History:   Social History     Socioeconomic History    Marital status:    Tobacco Use    Smoking status: Never    Smokeless tobacco: Never   Vaping Use    Vaping status: Never Used   Substance and Sexual Activity    Alcohol use: Yes     Comment: twice per month    Drug use: No    Sexual activity: Yes     Partners: Male   Other Topics Concern    Caffeine Concern Yes     Comment: 2 cups daily    Exercise Yes     Comment: 7 x a week. walk     Social Determinants of Health      Received from [facility]    Housing Stability         Allergies  Allergies   Allergen Reactions    Dust Mites HIVES       Review of Systems:   A 10-point review of systems was completed and is negative other than as noted above.    Physical Exam:   LMP 03/16/2015     GENERAL APPEARANCE: well developed, well nourished, in no acute distress  NEUROLOGIC: no localizing neurologic signs, alert and oriented x 3, converses appropriately  HEAD: atraumatic, normocephalic  EYES: sclera non-icteric  ORAL CAVITY: mucosa moist  NECK/THYROID: no obvious masses or goiter  LUNGS: non-labored breathing  EXTREMITIES: warm, well-perfused. No clubbing, cyanosis or edema.  SKIN: no obvious rashes    Results:     Laboratory Data:  Lab Results   Component Value Date    WBC 8.5 01/19/2024    HGB 14.6 01/19/2024     01/19/2024     Lab Results   Component Value Date     08/10/2021    K 4.6 01/19/2024     01/19/2024    CO2 22 01/19/2024    BUN 23 01/19/2024    GLU 89 01/19/2024    GFRAA 81 08/10/2021    AST 22 01/19/2024    ALT 30 01/19/2024    TP 6.9 01/19/2024    ALB 4.1 08/10/2021    PHOS 3.1 02/27/2017    CA 10.1 01/19/2024       Urinalysis Results (last three years):  Recent Labs     08/10/21  0748 08/19/21  0737 10/04/22  1630 11/18/22  1120 08/08/23  1858 07/24/24  0714 08/07/24  1425   COLORUR Orange* Yellow  --   --   --  Yellow  --    CLARITY Cloudy* Cloudy*  --   --   --  Turbid*  --    SPECGRAVITY 1.020 1.017 1.025 1.010 >=1.030 1.007 1.020   PHURINE  --  5.0 5.5 7.0 5.5 5.5 5.5   PROUR  --  Negative  --   --   --  Negative  --    GLUUR  --  Negative  --   --   --  Normal  --    KETUR  --  Negative  --   --   --  Negative  --    BILUR  --  Negative  --   --   --  Negative  --    BLOODURINE  --  Negative  --   --   --  1+*  --    NITRITE  --  Negative Positive* Positive* Negative Negative Negative   UROBILINOGEN  --  <2.0  --   --   --  Normal  --    LEUUR  --  Large*  --   --   --  500*  --    WBCUR  --  >50*  --   --   --  >50*  --    RBCUR  --  3-5*  --   --    --  0-2  --    BACUR  --  None Seen  --   --   --  3+*  --        Urine Culture Results (last three years):  Lab Results   Component Value Date    URINECUL <10,000 CFU/ML Gram negative margaret 07/24/2024    URINECUL 10,000 - 50,000 CFU/ML Citrobacter braakii (A) 06/13/2024    URINECUL No Growth at 18-24 hrs. 08/08/2023    URINECUL 10,000 - 50,000 CFU/ML Escherichia coli (A) 11/18/2022    URINECUL 10,000 - 50,000 CFU/ML Escherichia coli (A) 10/04/2022    URINECUL  05/14/2021     <10,000 cfu/ml Multiple species present- probable contamination.    URINECUL No Growth 2 Days 11/09/2020    URINECUL <10,000 CFU/ML Gram negative margaret 10/19/2020    URINECUL No Growth at 18-24 hrs. 07/29/2020    URINECUL No Growth at 18-24 hrs. 02/27/2020    URINECUL >100,000 CFU/ML Escherichia coli (A) 12/15/2019    URINECUL No Growth at 18-24 hrs. 09/16/2019    URINECUL No Growth at 18-24 hrs. 02/27/2019    URINECUL <10,000 CFU/ML Gram Negative Jermaine (A) 10/23/2018    URINECUL No Growth at 18-24 hrs. 11/13/2017       Imaging  No results found.      Impression:     Patient is a 54 year old female with hx of HTN, migraines, RLS, anxiety, GERD, OLE, who presents today for consultation for recurrent cystitis.     We reviewed recent urine culture results, appears that culture was positive despite low colony count. We reviewed use of PCR testing in this situation to help identify offending bacteria or any organisms that may be contributing to recurrent episodes of cystitis. We reviewed prior cystoscopy records and CT scan images. We reviewed consideration of recurrent bacterial cystitis vs aseptic cystitis and treatment of each.    We reviewed cystitis tx and prevention strategies at today's visit and I provided educational materials for this. Discussed dietery and behavioral issues including cranberry / extract pills / supplements, fluids, voiding technique, post coital hygiene. Double voiding, bending over after void to completely empty. I  encouraged the patient to drink at least 40-60 oz water per day or enough to keep urine clear. I also reviewed dietary irritants and provided educational materials for this. We also discussed trying prn AZO for pain relief with plenty of water or use of baking soda.    Recommendations:  [facility] PCR. D Mannose and Ultraflora probiotic daily. Consideration for estrace cream +/- PFT in future. Consider repeat cystoscopy in future if ongoing symptoms. Standing order for repeat standard cultures placed.     The above impression and plan were discussed in detail with the patient who verbalized understanding and all questions were answered.  A total of >45 minutes were spent on the visit including chart review in preparation for the visit, time with the patient, placing orders and communication with other providers where appropriate.      Thank you very much for this consult. Please call if there are any questions or concerns.     Roxi Yusuf PA-C  Urology  [facility]    Date: 8/7/2024

## 2024-08-12 ENCOUNTER — HOSPITAL ENCOUNTER (OUTPATIENT)
Dept: MAMMOGRAPHY | Age: 54
Discharge: HOME | End: 2024-08-12
Attending: NURSE PRACTITIONER
Payer: COMMERCIAL

## 2024-08-12 ENCOUNTER — TELEPHONE (OUTPATIENT)
Dept: SURGERY | Facility: CLINIC | Age: 54
End: 2024-08-12

## 2024-08-12 DIAGNOSIS — Z12.31 BREAST CANCER SCREENING BY MAMMOGRAM: ICD-10-CM

## 2024-08-12 PROCEDURE — 77067 SCR MAMMO BI INCL CAD: CPT | Performed by: NURSE PRACTITIONER

## 2024-08-12 PROCEDURE — 77063 BREAST TOMOSYNTHESIS BI: CPT | Performed by: NURSE PRACTITIONER

## 2024-08-12 RX ORDER — LEVOFLOXACIN 500 MG/1
500 TABLET, FILM COATED ORAL DAILY
Qty: 5 TABLET | Refills: 0 | Status: SHIPPED | OUTPATIENT
Start: 2024-08-12 | End: 2024-08-17

## 2024-08-12 NOTE — TELEPHONE ENCOUNTER
Received results.    Viridans group strep 10-50K   E Coli <10K    S levaquin > macrobid > fosfomycin > zosyn    R cipro, T/S, PCN, and cephalosporins    Will treat with Levaquin every day x 5 days.

## 2024-08-19 ENCOUNTER — HOSPITAL ENCOUNTER (OUTPATIENT)
Dept: MAMMOGRAPHY | Facility: HOSPITAL | Age: 54
Discharge: HOME | End: 2024-08-19
Attending: NURSE PRACTITIONER
Payer: COMMERCIAL

## 2024-08-19 DIAGNOSIS — R92.2 INCONCLUSIVE MAMMOGRAM: ICD-10-CM

## 2024-08-19 PROCEDURE — 76642 ULTRASOUND BREAST LIMITED: CPT | Performed by: NURSE PRACTITIONER

## 2024-08-19 PROCEDURE — 77065 DX MAMMO INCL CAD UNI: CPT | Performed by: NURSE PRACTITIONER

## 2024-08-19 PROCEDURE — 77061 BREAST TOMOSYNTHESIS UNI: CPT | Performed by: NURSE PRACTITIONER

## 2024-09-07 DIAGNOSIS — I10 ESSENTIAL HYPERTENSION, BENIGN: ICD-10-CM

## 2024-09-08 RX ORDER — LOSARTAN POTASSIUM 50 MG/1
50 TABLET ORAL DAILY
Qty: 90 TABLET | Refills: 0 | Status: SHIPPED | OUTPATIENT
Start: 2024-09-08

## 2024-09-09 ENCOUNTER — OFFICE VISIT (OUTPATIENT)
Dept: FAMILY MEDICINE CLINIC | Facility: CLINIC | Age: 54
End: 2024-09-09
Payer: COMMERCIAL

## 2024-09-09 VITALS
WEIGHT: 180 LBS | RESPIRATION RATE: 18 BRPM | HEART RATE: 85 BPM | TEMPERATURE: 97 F | BODY MASS INDEX: 29.99 KG/M2 | OXYGEN SATURATION: 97 % | DIASTOLIC BLOOD PRESSURE: 83 MMHG | SYSTOLIC BLOOD PRESSURE: 140 MMHG | HEIGHT: 65 IN

## 2024-09-09 DIAGNOSIS — J02.9 SORE THROAT: Primary | ICD-10-CM

## 2024-09-09 DIAGNOSIS — J06.9 UPPER RESPIRATORY TRACT INFECTION, UNSPECIFIED TYPE: ICD-10-CM

## 2024-09-09 DIAGNOSIS — R05.2 SUBACUTE COUGH: ICD-10-CM

## 2024-09-09 LAB
CONTROL LINE PRESENT WITH A CLEAR BACKGROUND (YES/NO): YES YES/NO
KIT LOT #: NORMAL NUMERIC
OPERATOR ID: NORMAL
POCT LOT NUMBER: NORMAL
RAPID SARS-COV-2 BY PCR: NOT DETECTED

## 2024-09-09 RX ORDER — BENZONATATE 200 MG/1
200 CAPSULE ORAL 3 TIMES DAILY PRN
Qty: 30 CAPSULE | Refills: 0 | Status: SHIPPED | OUTPATIENT
Start: 2024-09-09

## 2024-09-09 NOTE — PROGRESS NOTES
CHIEF COMPLAINT:     Chief Complaint   Patient presents with    Cough     Nasal congestion sore throat started Friday   No fevers        HPI:   Steffanie Orta is a 54 year old female who presents for upper respiratory symptoms for  3 days. Patient reports congestion, cough . Symptoms have been unchanged since onset.  Treating symptoms with dayquil.      Current Outpatient Medications   Medication Sig Dispense Refill    benzonatate 200 MG Oral Cap Take 1 capsule (200 mg total) by mouth 3 (three) times daily as needed for cough. 30 capsule 0    LOSARTAN 50 MG Oral Tab TAKE 1 TABLET(50 MG) BY MOUTH DAILY 90 tablet 0    SOOLANTRA 1 % External Cream APPLY DAILY A PEA SIZED AMOUNT TOPICALLY TO COVER AREAS OF FACE WITH THIN LAYER AVOIDING THE EYES AND LIPS      Butalbital-APAP-Caffeine -40 MG Oral Cap Take 1 capsule by mouth every 4 (four) hours as needed for Pain. (Patient not taking: Reported on 5/8/2024) 30 capsule 0    MONTELUKAST 10 MG Oral Tab TAKE 1 TABLET(10 MG) BY MOUTH DAILY 90 tablet 3    ALPRAZolam 0.25 MG Oral Tab Take 1 tablet (0.25 mg total) by mouth every 6 (six) hours as needed. 30 tablet 0    cyanocobalamin 1000 MCG Oral Tab Take 1 tablet (1,000 mcg total) by mouth daily.      Cholecalciferol (VITAMIN D) 50 MCG (2000 UT) Oral Cap Take by mouth.      ferrous sulfate 325 (65 FE) MG Oral Tab EC Take 1 tablet (325 mg total) by mouth daily with breakfast.      Calcium Polycarbophil (FIBER) 625 MG Oral Tab Take 1 tablet (625 mg total) by mouth daily as needed.      Fluticasone Propionate 50 MCG/ACT Nasal Suspension 1 spray by Nasal route daily.      loratadine 10 MG Oral Tab Take 1 tablet (10 mg total) by mouth daily.        Past Medical History:    Abdominal pain, left lower quadrant    Abnormal uterine bleeding    spotting for 3 days    Acute diverticulitis    Acute pharyngitis    Allergic rhinitis, cause unspecified    Amenorrhea    Mirena    Anxiety state, unspecified    Asthma, mild intermittent  (HCC)    Diverticulosis    Esophageal reflux    JARED (generalized anxiety disorder)    GERD (gastroesophageal reflux disease)    H/O mammogram    benign    Headache(784.0)    High blood pressure    Other abnormal blood chemistry    Other atopic dermatitis and related conditions    Other malaise and fatigue    Pap smear for cervical cancer screening    wnl pt stated    PONV (postoperative nausea and vomiting)    Unspecified constipation    Unspecified essential hypertension    Uterine fibroid    Visual impairment    glasses/contacts      Past Surgical History:   Procedure Laterality Date    Cholecystectomy      Colectomy  2021    Dr. White ROBOTIC LOW ANTERIOR COLON RESECTION OF RECTOSIGMOID COLON, POSSIBLE TRANSVERSE COLON SEGMENTAL    Colonoscopy      Colonoscopy  2017    hp polyp, diverticulosis. repeat 10 year    Colonoscopy N/A 2017    Procedure: COLONOSCOPY, POSSIBLE BIOPSY, POSSIBLE POLYPECTOMY 65252;  Surgeon: Abraham Pelletier MD;  Location: [facility]    Hysterectomy  2015    total hysterectomy due to myomas. intact ovaries.    Mirena, iud  2013    Other surgical history      elective     Other surgical history  2021    Cysto (Dr. Sheth)    Sling oper stres incontinence      TVT taping of cystocele    Upper arm/elbow surgery unlisted      repair fx of LUE         Social History     Socioeconomic History    Marital status:    Tobacco Use    Smoking status: Never    Smokeless tobacco: Never   Vaping Use    Vaping status: Never Used   Substance and Sexual Activity    Alcohol use: Yes     Comment: twice per month    Drug use: No    Sexual activity: Yes     Partners: Male   Other Topics Concern    Caffeine Concern Yes     Comment: 2 cups daily    Exercise Yes     Comment: 7 x a week. walk     Social Determinants of Health      Received from [facility]    Housing Stability         REVIEW OF SYSTEMS:   GENERAL: normal appetite  SKIN: no  rashes or abnormal skin lesions  HEENT: See HPI  LUNGS: See HPI  CARDIOVASCULAR: denies chest pain or palpitations   GI: denies N/V/C or abdominal pain      EXAM:   /83   Pulse 85   Temp 97 °F (36.1 °C)   Resp 18   Ht 5' 5\" (1.651 m)   Wt 180 lb (81.6 kg)   LMP 03/16/2015   SpO2 97%   BMI 29.95 kg/m²   GENERAL: well developed, well nourished,in no apparent distress  SKIN: no rashes,no suspicious lesions  HEAD: atraumatic, normocephalic.  no tenderness on palpation of  sinuses  EYES: conjunctiva clear, EOM intact  EARS: TM's pearly, no bulging, no retraction,no fluid, bony landmarks visible  NOSE: Nostrils patent, clear nasal discharge, nasal mucosa pink   THROAT: Oral mucosa pink, moist. Posterior pharynx is not erythematous. no exudates. Tonsils 1/4.    NECK: Supple, non-tender  LUNGS: clear to auscultation bilaterally, +cough, no wheezes or rhonchi. Breathing is non labored.  CARDIO: RRR without murmur  EXTREMITIES: no cyanosis, clubbing or edema  LYMPH:  no lymphadenopathy.        ASSESSMENT AND PLAN:   Steffanie Orta is a 54 year old female who presents with upper respiratory symptoms that are consistent with    ASSESSMENT:   Encounter Diagnoses   Name Primary?    Sore throat Yes    Upper respiratory tract infection, unspecified type     Subacute cough        PLAN: Meds as below.  Comfort care as described in Patient Instructions  Educated on viral vs bacterial  Educated on supportive measures: ibuprofen/tylenol, hydration, delsym for cough if needed  Educated on s/s of worsening sx and when to seek higher level of care  Follow up with pcp if not improving      Meds & Refills for this Visit:  Requested Prescriptions     Signed Prescriptions Disp Refills    benzonatate 200 MG Oral Cap 30 capsule 0     Sig: Take 1 capsule (200 mg total) by mouth 3 (three) times daily as needed for cough.     Risks, benefits, and side effects of medication explained and discussed.    The patient indicates  understanding of these issues and agrees to the plan.  The patient is asked to f/u with PCP if sx's persist or worsen.  There are no Patient Instructions on file for this visit.        negative...

## 2024-09-09 NOTE — TELEPHONE ENCOUNTER
Last time medication was refilled: 6/12/24  Next office visit due/scheduled: no apt  Last office visit: 5/8/24  Last Labs: 7/24/24

## 2024-10-07 DIAGNOSIS — F41.9 ANXIETY: ICD-10-CM

## 2024-10-07 RX ORDER — ALPRAZOLAM 0.25 MG
0.25 TABLET ORAL EVERY 6 HOURS PRN
Qty: 30 TABLET | Refills: 0 | Status: SHIPPED | OUTPATIENT
Start: 2024-10-07

## 2024-10-07 NOTE — TELEPHONE ENCOUNTER
Last time medication was refilled 08/22/2023  Last office visit  05/08/2024  Next office visit due/scheduled No future appointments.    Medication not on protocol.

## 2024-10-28 ENCOUNTER — PATIENT MESSAGE (OUTPATIENT)
Dept: SURGERY | Facility: CLINIC | Age: 54
End: 2024-10-28

## 2024-10-29 ENCOUNTER — LAB ENCOUNTER (OUTPATIENT)
Dept: LAB | Age: 54
End: 2024-10-29
Attending: PHYSICIAN ASSISTANT
Payer: COMMERCIAL

## 2024-10-29 ENCOUNTER — TELEPHONE (OUTPATIENT)
Dept: SURGERY | Facility: CLINIC | Age: 54
End: 2024-10-29

## 2024-10-29 DIAGNOSIS — N39.0 RECURRENT UTI: ICD-10-CM

## 2024-10-29 DIAGNOSIS — N39.0 RECURRENT UTI: Primary | ICD-10-CM

## 2024-10-29 PROCEDURE — 87086 URINE CULTURE/COLONY COUNT: CPT

## 2024-10-29 NOTE — TELEPHONE ENCOUNTER
My chart message:  Good morning, Roxi.     I started to have slight feelings of urgency and frequency on Saturday. I took a tsp of baking soda and water. Then on Sunday morning the symptoms were significant so I took AZOs twice. I woke up this morning with some minor symptoms so I took another dose of AZOs.      Since we met last, I have added the urinary probiotic with D-mannose included to my daily routine of medication/supplements.      Would you like for me to go to the lab to have a urinalysis done or wait for a couple more days? Any advice is appreciated.      Thank you!        
Wowo message sent.  Urine culture ordered.  
The patient is a 5y3m Female complaining of abdominal pain.

## 2024-11-01 ENCOUNTER — TELEPHONE (OUTPATIENT)
Dept: SURGERY | Facility: CLINIC | Age: 54
End: 2024-11-01

## 2024-11-01 NOTE — TELEPHONE ENCOUNTER
CC: Gabriel Albright  is here today for follow up right ankle and left foot pain.    Pt denies any new onset of Shortness of Breath or Chest Pain.  Referring MD Dr Sequeira  PCP Cristóbal Beach MD  Medications: medications verified, no change  Refills needed today?  NO  denies known Latex allergy or symptoms of Latex sensitivity.  Patient would like communication of their results via:      Cell Phone:   Telephone Information:   Mobile [phone]     Okay to leave a message containing results? Yes  Tobacco history: verified      [facility] [address] [place] [address] [address] [facility] [address] & [facility]  [address] [place] [address]  Phone: [phone] Fax: [phone]    [id number] [place] [address] [address] [facility] AT [facility] & [place] [address] [address] [facility] [address]  Phone: [phone] Fax: [phone]       Per patient calling for urine test results. Please advise

## 2024-11-05 ENCOUNTER — OFFICE VISIT (OUTPATIENT)
Dept: SURGERY | Facility: CLINIC | Age: 54
End: 2024-11-05

## 2024-11-05 DIAGNOSIS — N95.2 VAGINAL ATROPHY: ICD-10-CM

## 2024-11-05 DIAGNOSIS — N39.0 RECURRENT UTI: Primary | ICD-10-CM

## 2024-11-05 LAB
APPEARANCE: CLEAR
BILIRUBIN: NEGATIVE
GLUCOSE (URINE DIPSTICK): NEGATIVE MG/DL
KETONES (URINE DIPSTICK): NEGATIVE MG/DL
LEUKOCYTES: NEGATIVE
MULTISTIX LOT#: NORMAL NUMERIC
NITRITE, URINE: NEGATIVE
OCCULT BLOOD: NEGATIVE
PH, URINE: 5.5 (ref 4.5–8)
PROTEIN (URINE DIPSTICK): NEGATIVE MG/DL
SPECIFIC GRAVITY: 1 (ref 1–1.03)
URINE-COLOR: YELLOW
UROBILINOGEN,SEMI-QN: 0.2 MG/DL (ref 0–1.9)

## 2024-11-05 PROCEDURE — 99213 OFFICE O/P EST LOW 20 MIN: CPT | Performed by: PHYSICIAN ASSISTANT

## 2024-11-05 PROCEDURE — 81003 URINALYSIS AUTO W/O SCOPE: CPT | Performed by: PHYSICIAN ASSISTANT

## 2024-11-05 RX ORDER — ESTRADIOL 0.1 MG/G
CREAM VAGINAL
Qty: 42.5 G | Refills: 5 | Status: SHIPPED | OUTPATIENT
Start: 2024-11-05

## 2024-11-05 NOTE — PATIENT INSTRUCTIONS
Fingertip Application Method  For Estrogen Cream     Apply vaginal estrogen cream nightly at bedtime to external vagina for 2 weeks straight.  After 2 weeks, use cream 3 nights a week (i.e Monday, Wednesday, Friday) thereafter.   Follow the instructions below to help you.      Wash your hands thoroughly. Discard the plastic applicator.      Squeeze tube to express out enough to cover the tip of your index finger, from the last joint to the finger tip.  Figure 1       Figure 1     Locate vaginal opening.  Immediately above the vagina is the urethra (a small opening where urine is eliminated from your body). Figure 2   Carefully spread the cream onto the vaginal/urethral area.  Some may be gently inserted into the vagina additionally.          Figure 2

## 2024-11-05 NOTE — PROGRESS NOTES
Subjective:   Patient is a 54 year old female with hx of HTN, migraines, RLS, anxiety, GERD, OLE, who presents today for follow up recurrent UTI.    Patient was seen for consult in August 2024 for cystitis symptoms with negative culture. PCR testing showed small colony count of viridans group strep and E Coli. She was treated with Levaquin x 5 days with complete resolution of symptoms. At that time we also discussed avoidance of bladder irritants and she stopped drinking diet coke.     She was doing well until approximately one week ago. She has been taking D Mannose and probiotic for UTI prevention. She used AZO with relief of symptoms and after stopping does feel better but has some constant sensation of irritation which may be external and urge to void. She also notes she started drinking diet coke again.     UA today negative PVR 88mL     PRIOR NOTE:  Recurrent infections for approximately 3 years ago. Had cystoscopy in May 2021 with Dr. Figueroa Sheth that showed inflammation but no clear fistula. Following colon resection in June 2021 with what seemed like resolution of cystitis episodes for long period of time.     Currently has been taking AZO with relief of symptoms.     Fluids 64 oz of water daily on a good day  Does notice symptoms worse with alcohol  Bowels irregular  Hysterectomy in 2018, prior sling 25 years.  No vaginal dryness or pain with intercourse  Good hygiene     Patient notes that she has been experiencing intermittent bladder pain with associated malodorous urine, urinary urgency, and frequency for approximately one year. Denies any associated dysuria, gross hematuria. Recent urine cultures have been negative.      UA today negative, PVR 0mL     Urinalysis 7/24/24 >50 WBC/hpf, 3+ bacteria  Urine culture <10K gram neg margaret -> took Cipro for 24 hours.   Urine culture 10-50K Citrobacter R Keflex     CT a/p 1/24/24 negative stones, s/p hysterectomy      History/Other:    Chief Complaint Reviewed and  Verified  Nursing Notes Reviewed and   Verified  Allergies Reviewed  Medications Reviewed         Current Outpatient Medications   Medication Sig Dispense Refill    estradiol (ESTRACE) 0.1 MG/GM Vaginal Cream Apply a small (pea-sized) amount to wili urethral region daily for two weeks followed by three times per week thereafter 42.5 g 5    ALPRAZolam 0.25 MG Oral Tab Take 1 tablet (0.25 mg total) by mouth every 6 (six) hours as needed. 30 tablet 0    benzonatate 200 MG Oral Cap Take 1 capsule (200 mg total) by mouth 3 (three) times daily as needed for cough. 30 capsule 0    LOSARTAN 50 MG Oral Tab TAKE 1 TABLET(50 MG) BY MOUTH DAILY 90 tablet 0    SOOLANTRA 1 % External Cream APPLY DAILY A PEA SIZED AMOUNT TOPICALLY TO COVER AREAS OF FACE WITH THIN LAYER AVOIDING THE EYES AND LIPS      Butalbital-APAP-Caffeine -40 MG Oral Cap Take 1 capsule by mouth every 4 (four) hours as needed for Pain. (Patient not taking: Reported on 5/8/2024) 30 capsule 0    MONTELUKAST 10 MG Oral Tab TAKE 1 TABLET(10 MG) BY MOUTH DAILY 90 tablet 3    cyanocobalamin 1000 MCG Oral Tab Take 1 tablet (1,000 mcg total) by mouth daily.      Cholecalciferol (VITAMIN D) 50 MCG (2000 UT) Oral Cap Take by mouth.      ferrous sulfate 325 (65 FE) MG Oral Tab EC Take 1 tablet (325 mg total) by mouth daily with breakfast.      Calcium Polycarbophil (FIBER) 625 MG Oral Tab Take 1 tablet (625 mg total) by mouth daily as needed.      Fluticasone Propionate 50 MCG/ACT Nasal Suspension 1 spray by Nasal route daily.      loratadine 10 MG Oral Tab Take 1 tablet (10 mg total) by mouth daily.         Review of Systems:  Pertinent items are noted in HPI.      Objective:   LMP 03/16/2015  Estimated body mass index is 29.95 kg/m² as calculated from the following:    Height as of 9/9/24: 5' 5\" (1.651 m).    Weight as of 9/9/24: 180 lb (81.6 kg).    No distress  Non labored respirations    Laboratory Data:  Lab Results   Component Value Date    WBC 8.5  01/19/2024    HGB 14.6 01/19/2024     01/19/2024     Lab Results   Component Value Date     08/10/2021    K 4.6 01/19/2024     01/19/2024    CO2 22 01/19/2024    BUN 23 01/19/2024    GLU 89 01/19/2024    GFRAA 81 08/10/2021    AST 22 01/19/2024    ALT 30 01/19/2024    TP 6.9 01/19/2024    ALB 4.1 08/10/2021    PHOS 3.1 02/27/2017    CA 10.1 01/19/2024       Urinalysis Results (last three years):  Recent Labs     10/04/22  1630 11/18/22  1120 08/08/23  1858 07/24/24  0714 08/07/24  1425 11/05/24  0946   COLORUR  --   --   --  Yellow  --   --    CLARITY  --   --   --  Turbid*  --   --    SPECGRAVITY 1.025 1.010 >=1.030 1.007 1.020 1.005   PHURINE 5.5 7.0 5.5 5.5 5.5 5.5   PROUR  --   --   --  Negative  --   --    GLUUR  --   --   --  Normal  --   --    KETUR  --   --   --  Negative  --   --    BILUR  --   --   --  Negative  --   --    BLOODURINE  --   --   --  1+*  --   --    NITRITE Positive* Positive* Negative Negative Negative Negative   UROBILINOGEN  --   --   --  Normal  --   --    LEUUR  --   --   --  500*  --   --    WBCUR  --   --   --  >50*  --   --    RBCUR  --   --   --  0-2  --   --    BACUR  --   --   --  3+*  --   --        Urine Culture Results (last three years):  Lab Results   Component Value Date    URINECUL  10/29/2024     <10,000 cfu/ml Multiple species present- probable contamination.    URINECUL <10,000 CFU/ML Gram negative margaret 07/24/2024    URINECUL 10,000 - 50,000 CFU/ML Citrobacter braakii (A) 06/13/2024    URINECUL No Growth at 18-24 hrs. 08/08/2023    URINECUL 10,000 - 50,000 CFU/ML Escherichia coli (A) 11/18/2022    URINECUL 10,000 - 50,000 CFU/ML Escherichia coli (A) 10/04/2022    URINECUL  05/14/2021     <10,000 cfu/ml Multiple species present- probable contamination.    URINECUL No Growth 2 Days 11/09/2020    URINECUL <10,000 CFU/ML Gram negative margaret 10/19/2020    URINECUL No Growth at 18-24 hrs. 07/29/2020    URINECUL No Growth at 18-24 hrs. 02/27/2020    URINECUL  >100,000 CFU/ML Escherichia coli (A) 12/15/2019    URINECUL No Growth at 18-24 hrs. 09/16/2019    URINECUL No Growth at 18-24 hrs. 02/27/2019    URINECUL <10,000 CFU/ML Gram Negative Jermaine (A) 10/23/2018       Imaging  No results found.    Assessment & Plan:   Recurrent cystitis  Vaginal atrophy    Reviewed with patient potential etiology of symptoms including vaginal atrophy and dietary irritants (diet coke). We will send urine for PCR testing again today and treat pending. We also reviewed consideration for trial of estrace cream, patient would like to try. Instructions provided, script sent.  Follow up pending above results.      Roxi Yusuf PA-C, 11/5/2024

## 2024-11-07 ENCOUNTER — TELEPHONE (OUTPATIENT)
Dept: SURGERY | Facility: CLINIC | Age: 54
End: 2024-11-07

## 2024-11-08 RX ORDER — NITROFURANTOIN 25; 75 MG/1; MG/1
100 CAPSULE ORAL 2 TIMES DAILY
Qty: 20 CAPSULE | Refills: 0 | Status: SHIPPED | OUTPATIENT
Start: 2024-11-08 | End: 2024-11-18

## 2025-01-11 LAB
AMB EXT BILIRUBIN, TOTAL: 0.4 MG/DL
AMB EXT BUN: 17 MG/DL
AMB EXT CALCIUM: 10
AMB EXT CHLORIDE: 102
AMB EXT CHOL/HDL RATIO: 3.9
AMB EXT CHOLESTEROL, TOTAL: 181 MG/DL
AMB EXT CMP ALT: 25 U/L
AMB EXT CMP AST: 21 U/L
AMB EXT CREATININE: 0.91 MG/DL
AMB EXT EGFR NON-AA: 74
AMB EXT GLUCOSE: 90 MG/DL
AMB EXT HDL CHOLESTEROL: 47 MG/DL
AMB EXT HEMATOCRIT: 47.5
AMB EXT HEMOGLOBIN: 15.1
AMB EXT LDL CHOLESTEROL, DIRECT: 103 MG/DL
AMB EXT MCV: 47.5
AMB EXT PLATELETS: 303
AMB EXT POSTASSIUM: 4.5 MMOL/L
AMB EXT SODIUM: 140 MMOL/L
AMB EXT TOTAL PROTEIN: 7.2
AMB EXT TRIGLYCERIDES: 176 MG/DL
AMB EXT WBC: 9.1 X10(3)UL

## 2025-01-15 ENCOUNTER — TELEPHONE (OUTPATIENT)
Dept: INTERNAL MEDICINE CLINIC | Facility: CLINIC | Age: 55
End: 2025-01-15

## 2025-01-22 DIAGNOSIS — G43.B0 OPHTHALMOPLEGIC MIGRAINE, NOT INTRACTABLE: ICD-10-CM

## 2025-01-22 RX ORDER — BUTALBITAL, ACETAMINOPHEN AND CAFFEINE 300; 40; 50 MG/1; MG/1; MG/1
1 CAPSULE ORAL EVERY 4 HOURS PRN
Qty: 30 CAPSULE | Refills: 0 | Status: SHIPPED | OUTPATIENT
Start: 2025-01-22 | End: 2025-01-23

## 2025-01-22 NOTE — TELEPHONE ENCOUNTER
Last time medication was refilled 3/4/24  Last office visit  5/8/24  Next office visit due/scheduled   Future Appointments   Date Time Provider Department Center   1/23/2025  8:00 AM Joanna Oconnor APRN [facility] [address]     Medication not on protocol.

## 2025-01-23 ENCOUNTER — OFFICE VISIT (OUTPATIENT)
Dept: INTERNAL MEDICINE CLINIC | Facility: CLINIC | Age: 55
End: 2025-01-23
Payer: COMMERCIAL

## 2025-01-23 VITALS
HEIGHT: 66 IN | HEART RATE: 78 BPM | TEMPERATURE: 98 F | DIASTOLIC BLOOD PRESSURE: 70 MMHG | BODY MASS INDEX: 28.93 KG/M2 | RESPIRATION RATE: 16 BRPM | SYSTOLIC BLOOD PRESSURE: 118 MMHG | WEIGHT: 180 LBS | OXYGEN SATURATION: 97 %

## 2025-01-23 DIAGNOSIS — I10 ESSENTIAL HYPERTENSION, BENIGN: ICD-10-CM

## 2025-01-23 DIAGNOSIS — Z00.00 ANNUAL PHYSICAL EXAM: Primary | ICD-10-CM

## 2025-01-23 DIAGNOSIS — R10.9 RIGHT FLANK PAIN: ICD-10-CM

## 2025-01-23 DIAGNOSIS — G43.B0 OPHTHALMOPLEGIC MIGRAINE, NOT INTRACTABLE: ICD-10-CM

## 2025-01-23 DIAGNOSIS — N39.0 RECURRENT URINARY TRACT INFECTION: ICD-10-CM

## 2025-01-23 DIAGNOSIS — G47.33 OSA (OBSTRUCTIVE SLEEP APNEA): ICD-10-CM

## 2025-01-23 DIAGNOSIS — G43.009 MIGRAINE WITHOUT AURA AND WITHOUT STATUS MIGRAINOSUS, NOT INTRACTABLE: ICD-10-CM

## 2025-01-23 LAB
APPEARANCE: CLEAR
BILIRUBIN: NEGATIVE
GLUCOSE (URINE DIPSTICK): NEGATIVE MG/DL
KETONES (URINE DIPSTICK): NEGATIVE MG/DL
LEUKOCYTES: NEGATIVE
MULTISTIX LOT#: ABNORMAL NUMERIC
NITRITE, URINE: NEGATIVE
OCCULT BLOOD: NEGATIVE
PH, URINE: 7 (ref 4.5–8)
PROTEIN (URINE DIPSTICK): NEGATIVE MG/DL
SPECIFIC GRAVITY: 1.02 (ref 1–1.03)
URINE-COLOR: YELLOW
UROBILINOGEN,SEMI-QN: 0.2 MG/DL (ref 0–1.9)

## 2025-01-23 RX ORDER — LOSARTAN POTASSIUM 50 MG/1
50 TABLET ORAL DAILY
Qty: 90 TABLET | Refills: 0 | Status: SHIPPED | OUTPATIENT
Start: 2025-01-23

## 2025-01-23 RX ORDER — TIRZEPATIDE 2.5 MG/.5ML
2.5 INJECTION, SOLUTION SUBCUTANEOUS WEEKLY
Qty: 2 ML | Refills: 0 | Status: SHIPPED | OUTPATIENT
Start: 2025-01-23 | End: 2025-02-14

## 2025-01-23 RX ORDER — BUTALBITAL, ACETAMINOPHEN AND CAFFEINE 300; 40; 50 MG/1; MG/1; MG/1
1 CAPSULE ORAL EVERY 4 HOURS PRN
Qty: 30 CAPSULE | Refills: 0 | Status: SHIPPED | OUTPATIENT
Start: 2025-01-23

## 2025-01-23 NOTE — PROGRESS NOTES
Wellness Exam    CC: Patient is presenting for a wellness exam    HPI:   Concerns: 2 weeks of intermittent right flank pain. No urinary symptoms. 2 nights ago pain woke her up. She is working out using her Peloton but it doesn't feel muscular in nature. She is drinking less than 60 oz water a day. Weight has been stuck at 180# for last year. She has been dieting and exercising routinely.     Pertinent Family History:   Family History   Problem Relation Age of Onset    Hypertension Mother     Alcohol and Other Disorders Associated Mother         Cirrhosis    Other (eczema) Mother     Cancer Father         melanoma    Lipids Father     Other (hyperlipidemia) Father     Breast Cancer Paternal Grandmother 55    Heart Disease Paternal Grandfather     Breast Cancer Paternal Aunt 74    Asthma Other         children        Gyne:   No recommendations at this time         Denies pelvic pain, abnormal discharge or genital lesions.    Last mammogram:   Health Maintenance   Topic Date Due    Mammogram  08/19/2025      Regular self breast exam: yes.    Bone Health: Last DEXA: na.  Osteoporosis prevention: weight resistant exercise    Last colonoscopy:    Health Maintenance   Topic Date Due    Colorectal Cancer Screening  09/27/2027        Reported Health: Good.  Diet: heart healthy, recommend low carb, exercise: routinely, weight and resistance.    Past Medical History:    Abdominal pain, left lower quadrant    Abnormal uterine bleeding    spotting for 3 days    Acute diverticulitis    Acute pharyngitis    Allergic rhinitis, cause unspecified    Amenorrhea    Mirena    Anxiety state, unspecified    Asthma, mild intermittent (HCC)    Diverticulosis    Esophageal reflux    JARED (generalized anxiety disorder)    GERD (gastroesophageal reflux disease)    H/O mammogram    benign    Headache(784.0)    High blood pressure    Other abnormal blood chemistry    Other atopic dermatitis and related conditions    Other malaise and fatigue     Pap smear for cervical cancer screening    wnl pt stated    PONV (postoperative nausea and vomiting)    Unspecified constipation    Unspecified essential hypertension    Uterine fibroid    Visual impairment    glasses/contacts     Past Surgical History:   Procedure Laterality Date    Cholecystectomy  1998    Colectomy  2021    Dr. White ROBOTIC LOW ANTERIOR COLON RESECTION OF RECTOSIGMOID COLON, POSSIBLE TRANSVERSE COLON SEGMENTAL    Colonoscopy      Colonoscopy  2017    hp polyp, diverticulosis. repeat 10 year    Colonoscopy N/A 2017    Procedure: COLONOSCOPY, POSSIBLE BIOPSY, POSSIBLE POLYPECTOMY 17577;  Surgeon: Abraham Pelletier MD;  [facility]: [facility]    Hysterectomy  2015    total hysterectomy due to myomas. intact ovaries.    Mirena, iud  2013    Other surgical history      elective     Other surgical history  2021    Cysto (Dr. Sheth)    Sling oper stres incontinence      TVT taping of cystocele    Upper arm/elbow surgery unlisted      repair fx of LUE     Social History     Socioeconomic History    Marital status:    Tobacco Use    Smoking status: Never    Smokeless tobacco: Never   Vaping Use    Vaping status: Never Used   Substance and Sexual Activity    Alcohol use: Yes     Comment: twice per month    Drug use: No    Sexual activity: Yes     Partners: Male   Other Topics Concern    Caffeine Concern Yes     Comment: 2 cups daily    Exercise Yes     Comment: 7 x a week. walk     Social Drivers of Health      Received from [facility]    Housing Stability     Medications Ordered Prior to Encounter[1]    Review of Systems   Constitutional: Negative for fever, chills and fatigue.   HENT: Negative for hearing loss, congestion, sore throat and neck pain.    Eyes: Negative for pain and visual disturbance.   Respiratory: Negative for cough and shortness of breath.    Cardiovascular: Negative for chest pain and palpitations.    Gastrointestinal: Negative for nausea, vomiting, abdominal pain and diarrhea.   Genitourinary: Negative for urgency, frequency of urination, and abnormal vaginal bleeding.   Musculoskeletal: Negative for arthralgias and gait problem.   Skin: Negative for color change and rash.   Neurological: Negative for tremors, weakness and numbness.   Hematological: Negative for adenopathy. Does not bruise/bleed easily.   Psychiatric/Behavioral: Negative for confusion and agitation. The patient is not nervous/anxious.      /70   Pulse 78   Temp 97.7 °F (36.5 °C)   Resp 16   Ht 5' 6\" (1.676 m)   Wt 180 lb (81.6 kg)   LMP 03/16/2015   SpO2 97%   BMI 29.05 kg/m²   Physical Exam   Constitutional: She is oriented to person, place, and time. She appears well-developed. No distress.   Head: Normocephalic and atraumatic.   Eyes: EOM are normal. Pupils are equal, round, and reactive to light. No scleral icterus. Fundoscopic exam: No hemorrhages, A/V nicking, exudates or papilledema.  ENT: TM's clear, nose normal, no oropharyngeal exudates or tonsillar hypertrophy    Neck: Normal range of motion. No thyromegaly present.   Cardiovascular: Normal rate, regular rhythm and normal heart sounds.  No murmur or friction rub heard.  Pulmonary/Chest: Effort normal and breath sounds normal bilaterally. She has no wheezes or rales.   Breasts: deferred  Abdominal: Soft. Bowel sounds are normal. There is no tenderness. No HSM.  Musculoskeletal: Normal range of motion. She exhibits no edema.   Lymphadenopathy: She has no cervical, supraclavicular, or axillary adenopathy.   : deferred  Neurological: She is alert and oriented to person, place, and time. DTRs are +2 and symmetric. Cranial nerves grossly intact.  Skin: Skin is warm. No rash noted. No erythema, pallor or jaundice.   Psychiatric: She has a normal mood and affect and her behavior is normal.     Assessment and Plan:  Steffanie Orta is a 54 year old female here for a  wellness exam.  Age appropriate cancer screening, labs, safety, immunizations were discussed with the patient and ordered as follows:    Obesity with HTN- start GLP-1, educated on how medication works, how it is administered and potential side effects. No hx pancreatitis or medullary thyroid disease  HTN- controlled on losartan  Right flank pain- check kidney us  OLE- controlled  Migraines- stable  Recurrent UTI- tolerating maintenance medication    Reviewed lab work from health screening at work  recommend regular cardiovascular and weight bearing exercise as well as a well-rounded diet.    Her 5 year prevention plan includes: annual physical and labs, 30 minutes exercise most days of week and heart healthy diet  Patient/Caregiver Education:  Patient/Caregiver Education: There are no barriers to learning. Medical education done.  Outcome: Patient verbalizes understanding.       Educated by: LOIDA Snow         [1]   Current Outpatient Medications on File Prior to Visit   Medication Sig Dispense Refill    BUTALBITAL-APAP-CAFFEINE -40 MG Oral Cap TAKE 1 CAPSULE BY MOUTH EVERY 4 HOURS AS NEEDED FOR PAIN 30 capsule 0    estradiol (ESTRACE) 0.1 MG/GM Vaginal Cream Apply a small (pea-sized) amount to wili urethral region daily for two weeks followed by three times per week thereafter 42.5 g 5    ALPRAZolam 0.25 MG Oral Tab Take 1 tablet (0.25 mg total) by mouth every 6 (six) hours as needed. 30 tablet 0    LOSARTAN 50 MG Oral Tab TAKE 1 TABLET(50 MG) BY MOUTH DAILY 90 tablet 0    SOOLANTRA 1 % External Cream APPLY DAILY A PEA SIZED AMOUNT TOPICALLY TO COVER AREAS OF FACE WITH THIN LAYER AVOIDING THE EYES AND LIPS      MONTELUKAST 10 MG Oral Tab TAKE 1 TABLET(10 MG) BY MOUTH DAILY 90 tablet 3    cyanocobalamin 1000 MCG Oral Tab Take 1 tablet (1,000 mcg total) by mouth daily.      Cholecalciferol (VITAMIN D) 50 MCG (2000 UT) Oral Cap Take by mouth.      ferrous sulfate 325 (65 FE) MG Oral Tab EC Take 1 tablet  (325 mg total) by mouth daily with breakfast.      Calcium Polycarbophil (FIBER) 625 MG Oral Tab Take 1 tablet (625 mg total) by mouth daily as needed.      Fluticasone Propionate 50 MCG/ACT Nasal Suspension 1 spray by Nasal route daily.      loratadine 10 MG Oral Tab Take 1 tablet (10 mg total) by mouth daily.       No current facility-administered medications on file prior to visit.

## 2025-01-23 NOTE — TELEPHONE ENCOUNTER
Patient comment: [facility] said that they did not receive the approval that was sent over.  Can you please resend? I apologize for the inconvenience.     I was unable to get in contact with pharmacy. It stated they were having problems with their system.

## 2025-01-24 ENCOUNTER — TELEPHONE (OUTPATIENT)
Dept: INTERNAL MEDICINE CLINIC | Facility: CLINIC | Age: 55
End: 2025-01-24

## 2025-01-24 NOTE — TELEPHONE ENCOUNTER
Spoke with Patient, information given for [facility] to see if she can get sooner appointment  Patient will call them and if not able to then we will speak with Joanna MARISCAL

## 2025-01-24 NOTE — TELEPHONE ENCOUNTER
Patient called the office stating that she had contacted [facility] and that they are booked out to the second week in February. Patient is requesting order to be changed to STAT or asks if she can go to urgent care to get this done as she states that the pain has increased. Please call back and advise regarding next steps.

## 2025-01-24 NOTE — TELEPHONE ENCOUNTER
Patient would like divya to change her order to channing to get it sooner - Patient can get in until second week of feb.   [facility]NEY W DOPPLER (TZG=[id number]) (Order #[id number]) on 1/23/25

## 2025-01-24 NOTE — TELEPHONE ENCOUNTER
Per Joanna MARISCAL  since she has no blood in urine US is not immediate need. She can check with [facility] if she wishes   Patient notified, voiced understanding

## 2025-01-31 ENCOUNTER — HOSPITAL ENCOUNTER (OUTPATIENT)
Dept: CT IMAGING | Age: 55
Discharge: HOME | End: 2025-01-31
Attending: PHYSICIAN ASSISTANT
Payer: COMMERCIAL

## 2025-01-31 ENCOUNTER — OFFICE VISIT (OUTPATIENT)
Dept: SURGERY | Facility: CLINIC | Age: 55
End: 2025-01-31
Payer: COMMERCIAL

## 2025-01-31 DIAGNOSIS — N39.0 RECURRENT UTI: ICD-10-CM

## 2025-01-31 DIAGNOSIS — R10.9 RIGHT FLANK PAIN: Primary | ICD-10-CM

## 2025-01-31 DIAGNOSIS — R10.9 RIGHT FLANK PAIN: ICD-10-CM

## 2025-01-31 LAB
APPEARANCE: CLEAR
BILIRUBIN: NEGATIVE
GLUCOSE (URINE DIPSTICK): NEGATIVE MG/DL
KETONES (URINE DIPSTICK): NEGATIVE MG/DL
LEUKOCYTES: NEGATIVE
MULTISTIX LOT#: NORMAL NUMERIC
NITRITE, URINE: NEGATIVE
OCCULT BLOOD: NEGATIVE
PH, URINE: 6 (ref 4.5–8)
PROTEIN (URINE DIPSTICK): NEGATIVE MG/DL
SPECIFIC GRAVITY: 1 (ref 1–1.03)
URINE-COLOR: YELLOW
UROBILINOGEN,SEMI-QN: 0.2 MG/DL (ref 0–1.9)

## 2025-01-31 PROCEDURE — 81003 URINALYSIS AUTO W/O SCOPE: CPT | Performed by: PHYSICIAN ASSISTANT

## 2025-01-31 PROCEDURE — 74176 CT ABD & PELVIS W/O CONTRAST: CPT | Performed by: PHYSICIAN ASSISTANT

## 2025-01-31 PROCEDURE — 99214 OFFICE O/P EST MOD 30 MIN: CPT | Performed by: PHYSICIAN ASSISTANT

## 2025-01-31 NOTE — PROGRESS NOTES
Subjective:   Steffanie Orta is a 54 year old female with hx of HTN, migraines, RLS, anxiety, GERD, OLE, who presents for right lower back pain.    Patient last in November 2024 for urinary complaints. PCR sent +enterococcus and e coli.  She was treated with macrobid BID x 10 days.     She has no current voiding complaints but has right low back pain x 3 weeks, progressively getting worse. Not worse with movements. UA Negative. Has been taking OTC medications without improvement.     CT 1/24/24 negative stones No personal history of stones.  Post hysterectomy  Partial bowel resection    History/Other:    Chief Complaint Reviewed and Verified  Nursing Notes Reviewed and   Verified  Allergies Reviewed         Current Outpatient Medications   Medication Sig Dispense Refill    Tirzepatide-Weight Management (ZEPBOUND) 2.5 MG/0.5ML Subcutaneous Solution Auto-injector Inject 2.5 mg into the skin once a week for 4 doses. 2 mL 0    losartan 50 MG Oral Tab Take 1 tablet (50 mg total) by mouth daily. 90 tablet 0    Butalbital-APAP-Caffeine -40 MG Oral Cap Take 1 capsule by mouth every 4 (four) hours as needed for Pain. 30 capsule 0    estradiol (ESTRACE) 0.1 MG/GM Vaginal Cream Apply a small (pea-sized) amount to wili urethral region daily for two weeks followed by three times per week thereafter 42.5 g 5    ALPRAZolam 0.25 MG Oral Tab Take 1 tablet (0.25 mg total) by mouth every 6 (six) hours as needed. 30 tablet 0    SOOLANTRA 1 % External Cream APPLY DAILY A PEA SIZED AMOUNT TOPICALLY TO COVER AREAS OF FACE WITH THIN LAYER AVOIDING THE EYES AND LIPS      MONTELUKAST 10 MG Oral Tab TAKE 1 TABLET(10 MG) BY MOUTH DAILY 90 tablet 3    cyanocobalamin 1000 MCG Oral Tab Take 1 tablet (1,000 mcg total) by mouth daily.      Cholecalciferol (VITAMIN D) 50 MCG (2000 UT) Oral Cap Take by mouth.      ferrous sulfate 325 (65 FE) MG Oral Tab EC Take 1 tablet (325 mg total) by mouth daily with breakfast.      Calcium  Polycarbophil (FIBER) 625 MG Oral Tab Take 1 tablet (625 mg total) by mouth daily as needed.      Fluticasone Propionate 50 MCG/ACT Nasal Suspension 1 spray by Nasal route daily.      loratadine 10 MG Oral Tab Take 1 tablet (10 mg total) by mouth daily.         Review of Systems:  Pertinent items are noted in HPI.      Objective:   [facility] 03/16/2015  Estimated body mass index is 29.05 kg/m² as calculated from the following:    Height as of 1/23/25: 5' 6\" (1.676 m).    Weight as of 1/23/25: 180 lb (81.6 kg).    No distress  Non labored respirations      Laboratory Data:  Lab Results   Component Value Date    WBC 9.1 01/11/2025    HGB 15.1 01/11/2025     01/11/2025     Lab Results   Component Value Date     08/10/2021    K 4.5 01/11/2025     01/11/2025    CO2 22 01/19/2024    BUN 17 01/11/2025    GLU 90 01/11/2025    GFRAA 81 08/10/2021    AST 21 01/11/2025    ALT 25 01/11/2025    TP 7.2 01/11/2025    ALB 4.1 08/10/2021    PHOS 3.1 02/27/2017    CA 10.0 01/11/2025       Urinalysis Results (last three years):  Recent Labs     10/04/22  1630 11/18/22  1120 08/08/23  1858 07/24/24  0714 08/07/24  1425 11/05/24  0946 01/23/25  0825 01/31/25  1145   COLORUR  --   --   --  Yellow  --   --   --   --    CLARITY  --   --   --  Turbid*  --   --   --   --    SPECGRAVITY 1.025 1.010 >=1.030 1.007 1.020 1.005 1.025* 1.005   PHURINE 5.5 7.0 5.5 5.5 5.5 5.5 7.0 6.0   PROUR  --   --   --  Negative  --   --   --   --    GLUUR  --   --   --  Normal  --   --   --   --    KETUR  --   --   --  Negative  --   --   --   --    BILUR  --   --   --  Negative  --   --   --   --    BLOODURINE  --   --   --  1+*  --   --   --   --    NITRITE Positive* Positive* Negative Negative Negative Negative Negative Negative   UROBILINOGEN  --   --   --  Normal  --   --   --   --    LEUUR  --   --   --  500*  --   --   --   --    WBCUR  --   --   --  >50*  --   --   --   --    RBCUR  --   --   --  0-2  --   --   --   --    BACUR  --   --    --  3+*  --   --   --   --        Urine Culture Results (last three years):  Lab Results   Component Value Date    URINECUL  10/29/2024     <10,000 cfu/ml Multiple species present- probable contamination.    URINECUL <10,000 CFU/ML Gram negative margaret 07/24/2024    URINECUL 10,000 - 50,000 CFU/ML Citrobacter braakii (A) 06/13/2024    URINECUL No Growth at 18-24 hrs. 08/08/2023    URINECUL 10,000 - 50,000 CFU/ML Escherichia coli (A) 11/18/2022    URINECUL 10,000 - 50,000 CFU/ML Escherichia coli (A) 10/04/2022    URINECUL  05/14/2021     <10,000 cfu/ml Multiple species present- probable contamination.    URINECUL No Growth 2 Days 11/09/2020    URINECUL <10,000 CFU/ML Gram negative margaret 10/19/2020    URINECUL No Growth at 18-24 hrs. 07/29/2020    URINECUL No Growth at 18-24 hrs. 02/27/2020    URINECUL >100,000 CFU/ML Escherichia coli (A) 12/15/2019    URINECUL No Growth at 18-24 hrs. 09/16/2019    URINECUL No Growth at 18-24 hrs. 02/27/2019    URINECUL <10,000 CFU/ML Gram Negative Jermaine (A) 10/23/2018       Imaging  No results found.    Assessment & Plan:   Right flank pain  Recurrent UTI    Recommend proceeding with CT stone to r/o distal stone. Suspect more MSK, if CT negative will defer further tx/recommendation to PCP      Roxi Yusuf PA-C, 1/31/2025

## 2025-02-14 ENCOUNTER — OFFICE VISIT (OUTPATIENT)
Dept: INTERNAL MEDICINE CLINIC | Facility: CLINIC | Age: 55
End: 2025-02-14
Payer: COMMERCIAL

## 2025-02-14 VITALS
RESPIRATION RATE: 18 BRPM | TEMPERATURE: 97 F | BODY MASS INDEX: 27.48 KG/M2 | SYSTOLIC BLOOD PRESSURE: 132 MMHG | WEIGHT: 171 LBS | DIASTOLIC BLOOD PRESSURE: 84 MMHG | HEART RATE: 70 BPM | HEIGHT: 66 IN | OXYGEN SATURATION: 100 %

## 2025-02-14 DIAGNOSIS — I10 ESSENTIAL HYPERTENSION, BENIGN: Primary | ICD-10-CM

## 2025-02-14 DIAGNOSIS — S39.012A STRAIN OF LUMBAR REGION, INITIAL ENCOUNTER: ICD-10-CM

## 2025-02-14 DIAGNOSIS — G47.33 OSA (OBSTRUCTIVE SLEEP APNEA): ICD-10-CM

## 2025-02-14 PROCEDURE — 99214 OFFICE O/P EST MOD 30 MIN: CPT | Performed by: NURSE PRACTITIONER

## 2025-02-14 RX ORDER — OMEPRAZOLE 20 MG/1
20 CAPSULE, DELAYED RELEASE ORAL 2 TIMES DAILY
COMMUNITY
Start: 2025-01-29

## 2025-02-14 RX ORDER — TIRZEPATIDE 5 MG/.5ML
5 INJECTION, SOLUTION SUBCUTANEOUS WEEKLY
Qty: 2 ML | Refills: 0 | Status: SHIPPED | OUTPATIENT
Start: 2025-02-14 | End: 2025-03-08

## 2025-02-14 NOTE — PROGRESS NOTES
HPI:    Patient ID: Steffanie Orta is a 54 year old female.    Chief Complaint   Patient presents with    Medication Follow-Up       Doing great on zepbound, mild constipation but managing with water and fiber. She is down 9# food noise is off. She is eating smaller portion, exercising with peloton. She pulled her back yesterday. Saw chiropractor twice and had massage, feeling better        Review of Systems   Constitutional: Negative.    Respiratory: Negative.     Cardiovascular: Negative.    Gastrointestinal: Negative.    Genitourinary: Negative.    Musculoskeletal:  Positive for back pain (mild).   Neurological: Negative.          Past Medical History:    Abdominal pain, left lower quadrant    Abnormal uterine bleeding    spotting for 3 days    Acute diverticulitis    Acute pharyngitis    Allergic rhinitis, cause unspecified    Amenorrhea    Mirena    Anxiety state, unspecified    Asthma, mild intermittent (HCC)    Diverticulosis    Esophageal reflux    JARED (generalized anxiety disorder)    GERD (gastroesophageal reflux disease)    H/O mammogram    benign    Headache(784.0)    High blood pressure    Other abnormal blood chemistry    Other atopic dermatitis and related conditions    Other malaise and fatigue    Pap smear for cervical cancer screening    wnl pt stated    PONV (postoperative nausea and vomiting)    Unspecified constipation    Unspecified essential hypertension    Uterine fibroid    Visual impairment    glasses/contacts     Past Surgical History:   Procedure Laterality Date    Cholecystectomy  1998    Colectomy  06/30/2021    Dr. White ROBOTIC LOW ANTERIOR COLON RESECTION OF RECTOSIGMOID COLON, POSSIBLE TRANSVERSE COLON SEGMENTAL    Colonoscopy      Colonoscopy  09/2017    hp polyp, diverticulosis. repeat 10 year    Colonoscopy N/A 9/27/2017    Procedure: COLONOSCOPY, POSSIBLE BIOPSY, POSSIBLE POLYPECTOMY 71269;  Surgeon: Abraham Pelletier MD;  Location: [facility]    Hysterectomy   2015    total hysterectomy due to myomas. intact ovaries.    Mirena, iud  2013    Other surgical history      elective     Other surgical history  2021    Cysto (Dr. Sheth)    Sling oper stres incontinence      TVT taping of cystocele    Upper arm/elbow surgery unlisted      repair fx of LUE     Family History   Problem Relation Age of Onset    Hypertension Mother     Alcohol and Other Disorders Associated Mother         Cirrhosis    Other (eczema) Mother     Cancer Father         melanoma    Lipids Father     Other (hyperlipidemia) Father     Breast Cancer Paternal Grandmother 55    Heart Disease Paternal Grandfather     Breast Cancer Paternal Aunt 74    Asthma Other         children     Social History     Socioeconomic History    Marital status:    Tobacco Use    Smoking status: Never    Smokeless tobacco: Never   Vaping Use    Vaping status: Never Used   Substance and Sexual Activity    Alcohol use: Yes     Comment: twice per month    Drug use: No    Sexual activity: Yes     Partners: Male   Other Topics Concern    Caffeine Concern Yes     Comment: 2 cups daily    Exercise Yes     Comment: 7 x a week. walk          Current Outpatient Medications   Medication Sig Dispense Refill    omeprazole 20 MG Oral Capsule Delayed Release Take 1 capsule (20 mg total) by mouth 2 (two) times daily.      Tirzepatide-Weight Management (ZEPBOUND) 5 MG/0.5ML Subcutaneous Solution Auto-injector Inject 5 mg into the skin once a week for 4 doses. 2 mL 0    Tirzepatide-Weight Management (ZEPBOUND) 2.5 MG/0.5ML Subcutaneous Solution Auto-injector Inject 2.5 mg into the skin once a week for 4 doses. 2 mL 0    losartan 50 MG Oral Tab Take 1 tablet (50 mg total) by mouth daily. 90 tablet 0    Butalbital-APAP-Caffeine -40 MG Oral Cap Take 1 capsule by mouth every 4 (four) hours as needed for Pain. 30 capsule 0    estradiol (ESTRACE) 0.1 MG/GM Vaginal Cream Apply a small (pea-sized) amount to wili  urethral region daily for two weeks followed by three times per week thereafter 42.5 g 5    ALPRAZolam 0.25 MG Oral Tab Take 1 tablet (0.25 mg total) by mouth every 6 (six) hours as needed. 30 tablet 0    SOOLANTRA 1 % External Cream APPLY DAILY A PEA SIZED AMOUNT TOPICALLY TO COVER AREAS OF FACE WITH THIN LAYER AVOIDING THE EYES AND LIPS      MONTELUKAST 10 MG Oral Tab TAKE 1 TABLET(10 MG) BY MOUTH DAILY 90 tablet 3    cyanocobalamin 1000 MCG Oral Tab Take 1 tablet (1,000 mcg total) by mouth daily.      Cholecalciferol (VITAMIN D) 50 MCG (2000 UT) Oral Cap Take by mouth.      ferrous sulfate 325 (65 FE) MG Oral Tab EC Take 1 tablet (325 mg total) by mouth daily with breakfast.      Calcium Polycarbophil (FIBER) 625 MG Oral Tab Take 1 tablet (625 mg total) by mouth daily as needed.      Fluticasone Propionate 50 MCG/ACT Nasal Suspension 1 spray by Nasal route daily.      loratadine 10 MG Oral Tab Take 1 tablet (10 mg total) by mouth daily.       Allergies:Allergies[1]    Lab Results   Component Value Date    GLU 90 01/11/2025    BUN 17 01/11/2025    BUNCREA 18.6 04/07/2021    CREATSERUM 0.91 01/11/2025    ANIONGAP 8 08/10/2021    GFR 87 11/13/2017    GFRNAA 74 01/11/2025    GFRAA 81 08/10/2021    CA 10.0 01/11/2025    OSMOCALC 288 08/10/2021    ALKPHO 78 08/10/2021    AST 21 01/11/2025    ALT 25 01/11/2025    BILT 0.4 01/11/2025    TP 7.2 01/11/2025    ALB 4.1 08/10/2021    GLOBULIN 3.6 08/10/2021     08/10/2021    K 4.5 01/11/2025     01/11/2025    CO2 22 01/19/2024     Lab Results   Component Value Date    WBC 9.1 01/11/2025    RBC 5.13 08/10/2021    HGB 15.1 01/11/2025    HCT 47.5 01/11/2025    MCV 47.5 01/11/2025    MCH 28.7 08/10/2021    MCHC 32.0 08/10/2021    RDW 13.1 08/10/2021     01/11/2025     Lab Results   Component Value Date    CHOLEST 181 01/11/2025    TRIG 176 01/11/2025    HDL 47 01/11/2025     (H) 08/10/2021    VLDL 41 (H) 08/10/2021    TCHDLRATIO 3.90 01/11/2025     NONHDLC 155 (H) 08/10/2021     Lab Results   Component Value Date     08/10/2021    A1C 5.5 08/10/2021     Lab Results   Component Value Date    TSH 1.640 08/10/2021     Lab Results   Component Value Date    VITD 31.3 07/24/2023     Lab Results   Component Value Date    OBIE 239.3 07/24/2023     No results found for: \"FOLIC\", \"FOLATESER\", \"FOLATE\"  Lab Results   Component Value Date    IRON 63 07/24/2023     Lab Results   Component Value Date    B12 799 07/24/2023     Lab Results   Component Value Date    PHOS 3.1 02/27/2017     No results found for: \"MG\"     PHYSICAL EXAM:   /84   Pulse 70   Temp 97 °F (36.1 °C) (Temporal)   Resp 18   Ht 5' 6\" (1.676 m)   Wt 171 lb (77.6 kg)   LMP 03/16/2015   SpO2 100%   BMI 27.60 kg/m²   Physical Exam  Vitals and nursing note reviewed.   Constitutional:       Appearance: Normal appearance.   Cardiovascular:      Rate and Rhythm: Normal rate.   Pulmonary:      Effort: Pulmonary effort is normal.   Abdominal:      Palpations: Abdomen is soft.      Tenderness: There is no abdominal tenderness.   Neurological:      Mental Status: She is alert and oriented to person, place, and time.   Psychiatric:         Mood and Affect: Mood normal.              ASSESSMENT/PLAN:   Diagnoses and all orders for this visit:    Essential hypertension, benign  -     Tirzepatide-Weight Management (ZEPBOUND) 5 MG/0.5ML Subcutaneous Solution Auto-injector; Inject 5 mg into the skin once a week for 4 doses.    OLE (obstructive sleep apnea)  -     Tirzepatide-Weight Management (ZEPBOUND) 5 MG/0.5ML Subcutaneous Solution Auto-injector; Inject 5 mg into the skin once a week for 4 doses.    BMI 29.0-29.9,adult  -     Tirzepatide-Weight Management (ZEPBOUND) 5 MG/0.5ML Subcutaneous Solution Auto-injector; Inject 5 mg into the skin once a week for 4 doses.    Strain of lumbar region, initial encounter- continue stretches, heat or ice. Tylenol prn.     Send [employer] message in 1 month with  update on weight and any new symptoms. Agrees with plan of care     JESSICA Nichols            [1]   Allergies  Allergen Reactions    Dust Mites HIVES    Mold Coughing and HIVES

## 2025-03-13 ENCOUNTER — PATIENT MESSAGE (OUTPATIENT)
Dept: INTERNAL MEDICINE CLINIC | Facility: CLINIC | Age: 55
End: 2025-03-13

## 2025-03-13 RX ORDER — TIRZEPATIDE 7.5 MG/.5ML
7.5 INJECTION, SOLUTION SUBCUTANEOUS WEEKLY
Qty: 2 ML | Refills: 0 | Status: SHIPPED | OUTPATIENT
Start: 2025-03-13 | End: 2025-04-04

## 2025-03-13 NOTE — TELEPHONE ENCOUNTER
Patient completed one month of Zepbound 5 mg. Interested in 7.5 mg dosage now.  Last office visit  2/14/25  Next office visit due/scheduled no appt scheduled

## 2025-03-25 ENCOUNTER — LAB ENCOUNTER (OUTPATIENT)
Dept: LAB | Age: 55
End: 2025-03-25
Attending: PHYSICIAN ASSISTANT
Payer: COMMERCIAL

## 2025-03-25 DIAGNOSIS — N39.0 RECURRENT UTI: ICD-10-CM

## 2025-03-25 PROCEDURE — 87086 URINE CULTURE/COLONY COUNT: CPT

## 2025-04-05 ENCOUNTER — OFFICE VISIT (OUTPATIENT)
Dept: INTERNAL MEDICINE CLINIC | Facility: CLINIC | Age: 55
End: 2025-04-05
Payer: COMMERCIAL

## 2025-04-05 VITALS
DIASTOLIC BLOOD PRESSURE: 80 MMHG | SYSTOLIC BLOOD PRESSURE: 122 MMHG | HEART RATE: 70 BPM | WEIGHT: 160 LBS | OXYGEN SATURATION: 99 % | HEIGHT: 66 IN | BODY MASS INDEX: 25.71 KG/M2 | TEMPERATURE: 97 F | RESPIRATION RATE: 18 BRPM

## 2025-04-05 DIAGNOSIS — R11.2 NAUSEA AND VOMITING, UNSPECIFIED VOMITING TYPE: Primary | ICD-10-CM

## 2025-04-05 DIAGNOSIS — G47.33 OSA (OBSTRUCTIVE SLEEP APNEA): ICD-10-CM

## 2025-04-05 DIAGNOSIS — I10 ESSENTIAL HYPERTENSION, BENIGN: ICD-10-CM

## 2025-04-05 RX ORDER — TIRZEPATIDE 10 MG/.5ML
10 INJECTION, SOLUTION SUBCUTANEOUS WEEKLY
Qty: 2 ML | Refills: 0 | Status: SHIPPED | OUTPATIENT
Start: 2025-04-05 | End: 2025-04-27

## 2025-04-05 RX ORDER — TIRZEPATIDE 7.5 MG/.5ML
7.5 INJECTION, SOLUTION SUBCUTANEOUS WEEKLY
COMMUNITY
Start: 2025-03-13 | End: 2025-04-05 | Stop reason: ALTCHOICE

## 2025-04-05 RX ORDER — ONDANSETRON 4 MG/1
4 TABLET, FILM COATED ORAL EVERY 12 HOURS PRN
Qty: 20 TABLET | Refills: 2 | Status: SHIPPED | OUTPATIENT
Start: 2025-04-05

## 2025-04-05 NOTE — PROGRESS NOTES
HPI:    Patient ID: Steffanie Orta is a 54 year old female.    Chief Complaint   Patient presents with    Medication Follow-Up       Down 20# since starting zepbound. Feels great! She is having nausea/vomit about 24 hours after injection.         Review of Systems   Constitutional: Negative.    Respiratory: Negative.     Cardiovascular: Negative.    Gastrointestinal:  Positive for nausea. Negative for abdominal pain.         Past Medical History:    Abdominal pain, left lower quadrant    Abnormal uterine bleeding    spotting for 3 days    Acute diverticulitis    Acute pharyngitis    Allergic rhinitis, cause unspecified    Amenorrhea    Mirena    Anxiety state, unspecified    Asthma, mild intermittent (HCC)    Diverticulosis    Esophageal reflux    JARED (generalized anxiety disorder)    GERD (gastroesophageal reflux disease)    H/O mammogram    benign    Headache(784.0)    High blood pressure    Other abnormal blood chemistry    Other atopic dermatitis and related conditions    Other malaise and fatigue    Pap smear for cervical cancer screening    wnl pt stated    PONV (postoperative nausea and vomiting)    Unspecified constipation    Unspecified essential hypertension    Uterine fibroid    Visual impairment    glasses/contacts     Past Surgical History:   Procedure Laterality Date    Cholecystectomy      Colectomy  2021    Dr. White ROBOTIC LOW ANTERIOR COLON RESECTION OF RECTOSIGMOID COLON, POSSIBLE TRANSVERSE COLON SEGMENTAL    Colonoscopy      Colonoscopy  2017    hp polyp, diverticulosis. repeat 10 year    Colonoscopy N/A 2017    Procedure: COLONOSCOPY, POSSIBLE BIOPSY, POSSIBLE POLYPECTOMY 70664;  Surgeon: Abraham Pelletier MD;  Location: [facility]    Hysterectomy  2015    total hysterectomy due to myomas. intact ovaries.    Mirena, iud  2013    Other surgical history      elective     Other surgical history  2021    Cysto (Dr. Sheth)    Sling oper stres  incontinence  1999    TVT taping of cystocele    Upper arm/elbow surgery unlisted      repair fx of LUE     Family History   Problem Relation Age of Onset    Hypertension Mother     Alcohol and Other Disorders Associated Mother         Cirrhosis    Other (eczema) Mother     Cancer Father         melanoma    Lipids Father     Other (hyperlipidemia) Father     Breast Cancer Paternal Grandmother 55    Heart Disease Paternal Grandfather     Breast Cancer Paternal Aunt 74    Asthma Other         children     Social History     Socioeconomic History    Marital status:    Tobacco Use    Smoking status: Never    Smokeless tobacco: Never   Vaping Use    Vaping status: Never Used   Substance and Sexual Activity    Alcohol use: Yes     Comment: twice per month    Drug use: No    Sexual activity: Yes     Partners: Male   Other Topics Concern    Caffeine Concern Yes     Comment: 2 cups daily    Exercise Yes     Comment: 7 x a week. walk          Current Outpatient Medications   Medication Sig Dispense Refill    ondansetron (ZOFRAN) 4 mg tablet Take 1 tablet (4 mg total) by mouth every 12 (twelve) hours as needed for Nausea. 20 tablet 2    Tirzepatide-Weight Management (ZEPBOUND) 10 MG/0.5ML Subcutaneous Solution Auto-injector Inject 10 mg into the skin once a week for 4 doses. 2 mL 0    omeprazole 20 MG Oral Capsule Delayed Release Take 1 capsule (20 mg total) by mouth 2 (two) times daily.      losartan 50 MG Oral Tab Take 1 tablet (50 mg total) by mouth daily. 90 tablet 0    Butalbital-APAP-Caffeine -40 MG Oral Cap Take 1 capsule by mouth every 4 (four) hours as needed for Pain. 30 capsule 0    estradiol (ESTRACE) 0.1 MG/GM Vaginal Cream Apply a small (pea-sized) amount to wili urethral region daily for two weeks followed by three times per week thereafter 42.5 g 5    ALPRAZolam 0.25 MG Oral Tab Take 1 tablet (0.25 mg total) by mouth every 6 (six) hours as needed. 30 tablet 0    SOOLANTRA 1 % External Cream APPLY  DAILY A PEA SIZED AMOUNT TOPICALLY TO COVER AREAS OF FACE WITH THIN LAYER AVOIDING THE EYES AND LIPS      MONTELUKAST 10 MG Oral Tab TAKE 1 TABLET(10 MG) BY MOUTH DAILY 90 tablet 3    cyanocobalamin 1000 MCG Oral Tab Take 1 tablet (1,000 mcg total) by mouth daily.      Cholecalciferol (VITAMIN D) 50 MCG (2000 UT) Oral Cap Take by mouth.      ferrous sulfate 325 (65 FE) MG Oral Tab EC Take 1 tablet (325 mg total) by mouth daily with breakfast.      Calcium Polycarbophil (FIBER) 625 MG Oral Tab Take 1 tablet (625 mg total) by mouth daily as needed.      Fluticasone Propionate 50 MCG/ACT Nasal Suspension 1 spray by Nasal route daily.      loratadine 10 MG Oral Tab Take 1 tablet (10 mg total) by mouth daily.       Allergies:Allergies[1]    Lab Results   Component Value Date    GLU 90 01/11/2025    BUN 17 01/11/2025    BUNCREA 18.6 04/07/2021    CREATSERUM 0.91 01/11/2025    ANIONGAP 8 08/10/2021    GFR 87 11/13/2017    GFRNAA 74 01/11/2025    GFRAA 81 08/10/2021    CA 10.0 01/11/2025    OSMOCALC 288 08/10/2021    ALKPHO 78 08/10/2021    AST 21 01/11/2025    ALT 25 01/11/2025    BILT 0.4 01/11/2025    TP 7.2 01/11/2025    ALB 4.1 08/10/2021    GLOBULIN 3.6 08/10/2021     08/10/2021    K 4.5 01/11/2025     01/11/2025    CO2 22 01/19/2024     Lab Results   Component Value Date    WBC 9.1 01/11/2025    RBC 5.13 08/10/2021    HGB 15.1 01/11/2025    HCT 47.5 01/11/2025    MCV 47.5 01/11/2025    MCH 28.7 08/10/2021    MCHC 32.0 08/10/2021    RDW 13.1 08/10/2021     01/11/2025     Lab Results   Component Value Date    CHOLEST 181 01/11/2025    TRIG 176 01/11/2025    HDL 47 01/11/2025     (H) 08/10/2021    VLDL 41 (H) 08/10/2021    TCHDLRATIO 3.90 01/11/2025    NONHDLC 155 (H) 08/10/2021     Lab Results   Component Value Date     08/10/2021    A1C 5.5 08/10/2021     Lab Results   Component Value Date    TSH 1.640 08/10/2021     Lab Results   Component Value Date    VITD 31.3 07/24/2023     Lab  Results   Component Value Date    OBIE 239.3 07/24/2023     No results found for: \"FOLIC\", \"FOLATESER\", \"FOLATE\"  Lab Results   Component Value Date    IRON 63 07/24/2023     Lab Results   Component Value Date    B12 799 07/24/2023     Lab Results   Component Value Date    PHOS 3.1 02/27/2017     No results found for: \"MG\"     PHYSICAL EXAM:   /80   Pulse 70   Temp 97.1 °F (36.2 °C) (Temporal)   Resp 18   Ht 5' 6\" (1.676 m)   Wt 160 lb (72.6 kg)   LMP 03/16/2015   SpO2 99%   BMI 25.82 kg/m²   Physical Exam  Vitals and nursing note reviewed.   Constitutional:       Appearance: Normal appearance.   Cardiovascular:      Rate and Rhythm: Normal rate.   Pulmonary:      Effort: Pulmonary effort is normal.   Neurological:      Mental Status: She is alert.              ASSESSMENT/PLAN:   Diagnoses and all orders for this visit:    Nausea and vomiting, unspecified vomiting type  -     ondansetron (ZOFRAN) 4 mg tablet; Take 1 tablet (4 mg total) by mouth every 12 (twelve) hours as needed for Nausea.    Essential hypertension, benign  -     Tirzepatide-Weight Management (ZEPBOUND) 10 MG/0.5ML Subcutaneous Solution Auto-injector; Inject 10 mg into the skin once a week for 4 doses.    OLE (obstructive sleep apnea)  -     Tirzepatide-Weight Management (ZEPBOUND) 10 MG/0.5ML Subcutaneous Solution Auto-injector; Inject 10 mg into the skin once a week for 4 doses.    BMI 29.0-29.9,adult  -     Tirzepatide-Weight Management (ZEPBOUND) 10 MG/0.5ML Subcutaneous Solution Auto-injector; Inject 10 mg into the skin once a week for 4 doses.      Heart healthy diet with 150 min moderate activity weekly. Follow up in office in 2 months or sooner if needed    JESSICA Nichols            [1]   Allergies  Allergen Reactions    Dust Mites HIVES    Mold Coughing and HIVES

## 2025-04-26 DIAGNOSIS — I10 ESSENTIAL HYPERTENSION, BENIGN: ICD-10-CM

## 2025-04-26 RX ORDER — LOSARTAN POTASSIUM 50 MG/1
50 TABLET ORAL DAILY
Qty: 90 TABLET | Refills: 0 | Status: SHIPPED | OUTPATIENT
Start: 2025-04-26

## 2025-04-26 NOTE — TELEPHONE ENCOUNTER
Last time medication was refilled 01/23/2025  Last office visit  04/05/2025  Next office visit due/scheduled No future appointments.        Passed protocol, Medication sent.

## 2025-05-05 ENCOUNTER — PATIENT MESSAGE (OUTPATIENT)
Dept: INTERNAL MEDICINE CLINIC | Facility: CLINIC | Age: 55
End: 2025-05-05

## 2025-05-05 DIAGNOSIS — G47.33 OSA (OBSTRUCTIVE SLEEP APNEA): ICD-10-CM

## 2025-05-05 DIAGNOSIS — F41.9 ANXIETY: ICD-10-CM

## 2025-05-05 DIAGNOSIS — I10 ESSENTIAL HYPERTENSION, BENIGN: ICD-10-CM

## 2025-05-05 DIAGNOSIS — R11.2 NAUSEA AND VOMITING, UNSPECIFIED VOMITING TYPE: ICD-10-CM

## 2025-05-05 RX ORDER — ALPRAZOLAM 0.25 MG
0.25 TABLET ORAL EVERY 6 HOURS PRN
Qty: 30 TABLET | Refills: 0 | Status: SHIPPED | OUTPATIENT
Start: 2025-05-05

## 2025-05-05 RX ORDER — TIRZEPATIDE 10 MG/.5ML
10 INJECTION, SOLUTION SUBCUTANEOUS WEEKLY
Qty: 2 ML | Refills: 0 | Status: SHIPPED | OUTPATIENT
Start: 2025-05-05 | End: 2025-05-27

## 2025-05-05 RX ORDER — ONDANSETRON 4 MG/1
4 TABLET, FILM COATED ORAL EVERY 12 HOURS PRN
Qty: 20 TABLET | Refills: 2 | Status: SHIPPED | OUTPATIENT
Start: 2025-05-05

## 2025-05-05 NOTE — TELEPHONE ENCOUNTER
Last time medication was refilled 10/07/2024  Last office visit  04/005/2025  Next office visit due/scheduled No future appointments.          Medication not on protocol.

## 2025-05-05 NOTE — TELEPHONE ENCOUNTER
Last time medication was refilled 4/5/25  Last office visit  4/5/25  Next office visit due/scheduled none scheduled

## 2025-05-05 NOTE — TELEPHONE ENCOUNTER
Last time medication was refilled 04/05/2025  Last office visit  04/05/2025  Next office visit due/scheduled No future appointments.              Medication not on protocol.

## 2025-06-11 ENCOUNTER — OFFICE VISIT (OUTPATIENT)
Dept: INTERNAL MEDICINE CLINIC | Facility: CLINIC | Age: 55
End: 2025-06-11
Payer: COMMERCIAL

## 2025-06-11 VITALS
SYSTOLIC BLOOD PRESSURE: 120 MMHG | HEART RATE: 60 BPM | DIASTOLIC BLOOD PRESSURE: 76 MMHG | RESPIRATION RATE: 18 BRPM | OXYGEN SATURATION: 99 % | WEIGHT: 148 LBS | BODY MASS INDEX: 23.78 KG/M2 | HEIGHT: 66 IN | TEMPERATURE: 98 F

## 2025-06-11 DIAGNOSIS — G47.33 OSA (OBSTRUCTIVE SLEEP APNEA): ICD-10-CM

## 2025-06-11 DIAGNOSIS — I10 ESSENTIAL HYPERTENSION, BENIGN: ICD-10-CM

## 2025-06-11 RX ORDER — TIRZEPATIDE 10 MG/.5ML
10 INJECTION, SOLUTION SUBCUTANEOUS WEEKLY
Qty: 2 ML | Refills: 2 | Status: SHIPPED | OUTPATIENT
Start: 2025-06-11 | End: 2025-06-16

## 2025-06-11 NOTE — PROGRESS NOTES
HPI:    Patient ID: Steffanie Orta is a 54 year old female.    Chief Complaint   Patient presents with   • Medication Follow-Up       Medication Follow-Up    History of Present Illness  Steffanie Orta is a 54 year old female who presents with gastrointestinal symptoms and weight management concerns.    Injection on Wednesdays and she experiences gastrointestinal symptoms, feeling nauseous and bloated on Thursdays and Fridays, without vomiting. She takes Gas-X and Zofran on Wednesday nights, which aids her sleep. The symptoms persist for two days, and she is uncertain if they are exacerbated by carbohydrates or sweets.    She has lost approximately 38 pounds and aims to reach a weight of 145 pounds, recalling improved well-being at that weight. Her exercise routine includes walking her dogs, and she has donated larger-sized clothing as she now fits into size eight comfortably.    She discusses her sleep apnea, noting that the CPAP machine's pressure feels too intense at times, causing her to remove it during the night. Her 'aura ring' indicates low periods of apnea, and her  did not notice any snoring when she did not use the machine.      Review of Systems   All other systems reviewed and are negative.        Past Medical History[1]  Past Surgical History[2]  Family History[3]  Social Hx on file[4]     Current Medications[5]  Allergies:Allergies[6]    Lab Results   Component Value Date    GLU 90 01/11/2025    BUN 17 01/11/2025    BUNCREA 18.6 04/07/2021    CREATSERUM 0.91 01/11/2025    ANIONGAP 8 08/10/2021    GFR 87 11/13/2017    GFRNAA 74 01/11/2025    GFRAA 81 08/10/2021    CA 10.0 01/11/2025    OSMOCALC 288 08/10/2021    ALKPHO 78 08/10/2021    AST 21 01/11/2025    ALT 25 01/11/2025    BILT 0.4 01/11/2025    TP 7.2 01/11/2025    ALB 4.1 08/10/2021    GLOBULIN 3.6 08/10/2021     08/10/2021    K 4.5 01/11/2025     01/11/2025    CO2 22 01/19/2024     Lab Results   Component Value Date     WBC 9.1 01/11/2025    RBC 5.13 08/10/2021    HGB 15.1 01/11/2025    HCT 47.5 01/11/2025    MCV 47.5 01/11/2025    MCH 28.7 08/10/2021    MCHC 32.0 08/10/2021    RDW 13.1 08/10/2021     01/11/2025     Lab Results   Component Value Date    CHOLEST 181 01/11/2025    TRIG 176 01/11/2025    HDL 47 01/11/2025     (H) 08/10/2021    VLDL 41 (H) 08/10/2021    TCHDLRATIO 3.90 01/11/2025    NONHDLC 155 (H) 08/10/2021     Lab Results   Component Value Date     08/10/2021    A1C 5.5 08/10/2021     Lab Results   Component Value Date    TSH 1.640 08/10/2021     Lab Results   Component Value Date    VITD 31.3 07/24/2023     Lab Results   Component Value Date    OBIE 239.3 07/24/2023     No results found for: \"FOLIC\", \"FOLATESER\", \"FOLATE\"  Lab Results   Component Value Date    IRON 63 07/24/2023     Lab Results   Component Value Date    B12 799 07/24/2023     Lab Results   Component Value Date    PHOS 3.1 02/27/2017     No results found for: \"MG\"     PHYSICAL EXAM:   /76   Pulse 60   Temp 97.7 °F (36.5 °C) (Temporal)   Resp 18   Ht 5' 6\" (1.676 m)   Wt 148 lb (67.1 kg)   LMP 03/16/2015   SpO2 99%   BMI 23.89 kg/m²   Physical Exam  Vitals and nursing note reviewed.   Constitutional:       Appearance: Normal appearance.   Cardiovascular:      Rate and Rhythm: Normal rate and regular rhythm.      Pulses: Normal pulses.   Pulmonary:      Effort: Pulmonary effort is normal. No respiratory distress.      Breath sounds: Normal breath sounds.   Abdominal:      Palpations: Abdomen is soft.      Tenderness: There is no abdominal tenderness.   Musculoskeletal:      Right lower leg: No edema.      Left lower leg: No edema.   Neurological:      Mental Status: She is alert and oriented to person, place, and time.   Psychiatric:         Mood and Affect: Mood normal.            ASSESSMENT/PLAN:   Diagnoses and all orders for this visit:    BMI 29.0-29.9,adult  -     Tirzepatide-Weight Management (ZEPBOUND) 10  MG/0.5ML Subcutaneous Solution Auto-injector; Inject 10 mg into the skin once a week for 4 doses.    LOE (obstructive sleep apnea)  -     Tirzepatide-Weight Management (ZEPBOUND) 10 MG/0.5ML Subcutaneous Solution Auto-injector; Inject 10 mg into the skin once a week for 4 doses.    Essential hypertension, benign  -     Tirzepatide-Weight Management (ZEPBOUND) 10 MG/0.5ML Subcutaneous Solution Auto-injector; Inject 10 mg into the skin once a week for 4 doses.      Assessment & Plan  Obstructive sleep apnea  Reports intense sleep apnea symptoms at night, leading to CPAP mask removal. No snoring reported by partner, and sleep tracking device indicates low apnea events. Suspects CPAP pressure may be too high.  - Schedule appointment with sleep specialist for CPAP pressure adjustment.  - Contact sleep clinic for interim advice on pressure adjustment.    Nausea and gassiness  Experiences nausea and gassiness on Thursday and Friday after taking medication on Wednesday. No vomiting. Manages symptoms with simethicone and ondansetron. Discussed potential correlation with carbohydrate or sweet intake.  - Monitor for correlation between symptoms and carbohydrate or sweet intake.  - Reduce intake of carbohydrates or sweets if correlation is noted.    Weight management  Has lost 38 pounds and aims for a weight of 145 pounds. Engages in walking and plans to incorporate weight resistance and core exercises to aid in weight loss and improve midsection tone. Satisfied with current medication dosage and prefers to avoid increasing it to prevent potential side effects.  - Continue current medication dosage.  - Incorporate weight resistance and core exercises into routine.  - Monitor weight and dietary intake, especially when considering stepping down medication dosage.  - Follow medication storage guidelines while traveling.          JESSICA Nichols              [1]  Past Medical History:  • Abdominal pain, left lower quadrant    • Abnormal uterine bleeding    spotting for 3 days   • Acute diverticulitis   • Acute pharyngitis   • Allergic rhinitis, cause unspecified   • Amenorrhea    Mirena   • Anxiety state, unspecified   • Asthma, mild intermittent (HCC)   • Diverticulosis   • Esophageal reflux   • JARED (generalized anxiety disorder)   • GERD (gastroesophageal reflux disease)   • H/O mammogram    benign   • Headache(784.0)   • High blood pressure   • Other abnormal blood chemistry   • Other atopic dermatitis and related conditions   • Other malaise and fatigue   • Pap smear for cervical cancer screening    wnl pt stated   • PONV (postoperative nausea and vomiting)   • Unspecified constipation   • Unspecified essential hypertension   • Uterine fibroid   • Visual impairment    glasses/contacts   [2]  Past Surgical History:  Procedure Laterality Date   • Cholecystectomy     • Colectomy  2021    Dr. White ROBOTIC LOW ANTERIOR COLON RESECTION OF RECTOSIGMOID COLON, POSSIBLE TRANSVERSE COLON SEGMENTAL   • Colonoscopy     • Colonoscopy  2017    hp polyp, diverticulosis. repeat 10 year   • Colonoscopy N/A 2017    Procedure: COLONOSCOPY, POSSIBLE BIOPSY, POSSIBLE POLYPECTOMY 50925;  Surgeon: Abraham Pelletier MD;  Location: [facility]   • Hysterectomy  2015    total hysterectomy due to myomas. intact ovaries.   • Mirena, iud  2013   • Other surgical history      elective    • Other surgical history  2021    Cysto (Dr. Sheth)   • Sling oper stres incontinence      TVT taping of cystocele   • Upper arm/elbow surgery unlisted      repair fx of LUE   [3]  Family History  Problem Relation Age of Onset   • Hypertension Mother    • Alcohol and Other Disorders Associated Mother         Cirrhosis   • Other (eczema) Mother    • Cancer Father         melanoma   • Lipids Father    • Other (hyperlipidemia) Father    • Breast Cancer Paternal Grandmother 55   • Heart Disease Paternal Grandfather    • Breast  Cancer Paternal Aunt 74   • Asthma Other         children   [4]  Social History  Socioeconomic History   • Marital status:    Tobacco Use   • Smoking status: Never   • Smokeless tobacco: Never   Vaping Use   • Vaping status: Never Used   Substance and Sexual Activity   • Alcohol use: Yes     Comment: twice per month   • Drug use: No   • Sexual activity: Yes     Partners: Male   Other Topics Concern   • Caffeine Concern Yes     Comment: 2 cups daily   • Exercise Yes     Comment: 7 x a week. walk   [5]  Current Outpatient Medications   Medication Sig Dispense Refill   • Tirzepatide-Weight Management (ZEPBOUND) 10 MG/0.5ML Subcutaneous Solution Auto-injector Inject 10 mg into the skin once a week for 4 doses. 2 mL 2   • ondansetron (ZOFRAN) 4 mg tablet Take 1 tablet (4 mg total) by mouth every 12 (twelve) hours as needed for Nausea. 20 tablet 2   • ALPRAZolam 0.25 MG Oral Tab Take 1 tablet (0.25 mg total) by mouth every 6 (six) hours as needed. 30 tablet 0   • LOSARTAN 50 MG Oral Tab TAKE 1 TABLET(50 MG) BY MOUTH DAILY 90 tablet 0   • omeprazole 20 MG Oral Capsule Delayed Release Take 1 capsule (20 mg total) by mouth 2 (two) times daily.     • Butalbital-APAP-Caffeine -40 MG Oral Cap Take 1 capsule by mouth every 4 (four) hours as needed for Pain. 30 capsule 0   • estradiol (ESTRACE) 0.1 MG/GM Vaginal Cream Apply a small (pea-sized) amount to wili urethral region daily for two weeks followed by three times per week thereafter 42.5 g 5   • SOOLANTRA 1 % External Cream APPLY DAILY A PEA SIZED AMOUNT TOPICALLY TO COVER AREAS OF FACE WITH THIN LAYER AVOIDING THE EYES AND LIPS     • MONTELUKAST 10 MG Oral Tab TAKE 1 TABLET(10 MG) BY MOUTH DAILY 90 tablet 3   • cyanocobalamin 1000 MCG Oral Tab Take 1 tablet (1,000 mcg total) by mouth daily.     • Cholecalciferol (VITAMIN D) 50 MCG (2000 UT) Oral Cap Take by mouth.     • ferrous sulfate 325 (65 FE) MG Oral Tab EC Take 1 tablet (325 mg total) by mouth daily with  breakfast.     • Calcium Polycarbophil (FIBER) 625 MG Oral Tab Take 1 tablet (625 mg total) by mouth daily as needed.     • Fluticasone Propionate 50 MCG/ACT Nasal Suspension 1 spray by Nasal route daily.     • loratadine 10 MG Oral Tab Take 1 tablet (10 mg total) by mouth daily.     [6]  Allergies  Allergen Reactions   • Dust Mites HIVES   • Mold Coughing and HIVES

## 2025-06-11 NOTE — PROGRESS NOTES
The following individual(s) verbally consented to be recorded using ambient AI listening technology and understand that they can each withdraw their consent to this listening technology at any point by asking the clinician to turn off or pause the recording:    Patient name: Steffanie Orta  Additional names:  None   GOAL: Pt will ambulate 200 feet w/independently, w/use of appropriate assistive device in 2 weeks.

## 2025-06-16 DIAGNOSIS — I10 ESSENTIAL HYPERTENSION, BENIGN: ICD-10-CM

## 2025-06-16 DIAGNOSIS — G47.33 OSA (OBSTRUCTIVE SLEEP APNEA): ICD-10-CM

## 2025-06-16 RX ORDER — TIRZEPATIDE 10 MG/.5ML
10 INJECTION, SOLUTION SUBCUTANEOUS WEEKLY
Qty: 2 ML | Refills: 2 | Status: SHIPPED | OUTPATIENT
Start: 2025-06-16 | End: 2025-07-08

## 2025-06-16 NOTE — TELEPHONE ENCOUNTER
Last time medication was refilled 05/05/2025  Last office visit  06/11/2025  Next office visit due/scheduled No future appointments.          Patient comment: I'm on vacation and forgot my Zepbound. [facility] asked that I request a refill which may have already gone through their system.       To be filled at: None [Patient requested: [facility] - [address], [place]]

## 2025-07-23 DIAGNOSIS — I10 ESSENTIAL HYPERTENSION, BENIGN: ICD-10-CM

## 2025-07-23 RX ORDER — LOSARTAN POTASSIUM 50 MG/1
50 TABLET ORAL DAILY
Qty: 90 TABLET | Refills: 0 | Status: SHIPPED | OUTPATIENT
Start: 2025-07-23

## 2025-08-27 ENCOUNTER — PATIENT MESSAGE (OUTPATIENT)
Dept: INTERNAL MEDICINE CLINIC | Facility: CLINIC | Age: 55
End: 2025-08-27

## 2025-08-27 DIAGNOSIS — Z12.31 SCREENING MAMMOGRAM FOR BREAST CANCER: Primary | ICD-10-CM

## (undated) DIAGNOSIS — F51.04 CHRONIC INSOMNIA: ICD-10-CM

## (undated) DIAGNOSIS — R09.82 POSTNASAL DRIP: ICD-10-CM

## (undated) DIAGNOSIS — I10 ESSENTIAL HYPERTENSION, BENIGN: ICD-10-CM

## (undated) DIAGNOSIS — F41.9 ANXIETY: ICD-10-CM

## (undated) DEVICE — BLADELESS OBTURATOR: Brand: WECK VISTA

## (undated) DEVICE — TIP-UP FENESTRATED GRASPER: Brand: ENDOWRIST

## (undated) DEVICE — CADIERE FORCEPS: Brand: ENDOWRIST

## (undated) DEVICE — SUREFORM 45: Brand: SUREFORM

## (undated) DEVICE — STERILE POLYISOPRENE POWDER-FREE SURGICAL GLOVES: Brand: PROTEXIS

## (undated) DEVICE — BULB SYRINGE,IRRIGATION WITH PROTECTIVE CAP: Brand: DOVER

## (undated) DEVICE — CLOSING BUNDLE: Brand: MEDLINE INDUSTRIES, INC.

## (undated) DEVICE — SOL  .9 3000ML

## (undated) DEVICE — LAP COLON CDS: Brand: MEDLINE INDUSTRIES, INC.

## (undated) DEVICE — CANNULA SEAL

## (undated) DEVICE — TUBING CYSTO

## (undated) DEVICE — TIBURON DRAPE TOWELS: Brand: CONVERTORS

## (undated) DEVICE — BETADINE SOLUTION 8 OZ BTL

## (undated) DEVICE — BAG DRAIN INFECTION CNTRL 2000

## (undated) DEVICE — MONOFILAMENT ABSORBABLE SUTURE: Brand: MAXON

## (undated) DEVICE — GOWN,SIRUS,FABRIC-REINFORCED,X-LARGE: Brand: MEDLINE

## (undated) DEVICE — STAPLER CIRCULAR ENDO 29MM

## (undated) DEVICE — REDUCER: Brand: ENDOWRIST

## (undated) DEVICE — SUREFORM 45 RELOAD BLUE: Brand: SUREFORM

## (undated) DEVICE — COVER,MAYO STAND,STERILE: Brand: MEDLINE

## (undated) DEVICE — SUTURE ETHILON 2-0 FS

## (undated) DEVICE — VIOLET BRAIDED (POLYGLACTIN 910), SYNTHETIC ABSORBABLE SUTURE: Brand: COATED VICRYL

## (undated) DEVICE — 40580 - THE PINK PAD - ADVANCED TRENDELENBURG POSITIONING KIT: Brand: 40580 - THE PINK PAD - ADVANCED TRENDELENBURG POSITIONING KIT

## (undated) DEVICE — MEDI-VAC TUBING CONNECTOR 6-IN-1 "Y" POLYPROPYLENE: Brand: CARDINAL HEALTH

## (undated) DEVICE — POUCH SPECIMEN WIRE 6X3 250ML

## (undated) DEVICE — BLADE ELECTRODE: Brand: EDGE

## (undated) DEVICE — DRAIN CHANNEL 19FR BLAKE

## (undated) DEVICE — SCD SLEEVE KNEE HI BLEND

## (undated) DEVICE — SUTURE VICRYL 0

## (undated) DEVICE — ARM DRAPE

## (undated) DEVICE — SUTURE PDS II 1 CTX

## (undated) DEVICE — SUTURE VICRYL 5-0 PC-1

## (undated) DEVICE — TROCAR: Brand: KII SHIELDED BLADED ACCESS SYSTEM

## (undated) DEVICE — Device

## (undated) DEVICE — COLUMN DRAPE

## (undated) DEVICE — LIGHT HANDLE

## (undated) DEVICE — DRAIN RELIAVAC 100CC

## (undated) DEVICE — SEAL

## (undated) DEVICE — SIGMOIDOSCOPE LIGHTED BIOSEAL

## (undated) DEVICE — ENDOPATH ULTRA VERESS INSUFFLATION NEEDLES WITH LUER LOCK CONNECTORS: Brand: ENDOPATH

## (undated) DEVICE — SUTURE PROLENE 2-0 SH

## (undated) DEVICE — VISUALIZATION SYSTEM: Brand: CLEARIFY

## (undated) DEVICE — VESSEL SEALER EXTEND: Brand: ENDOWRIST

## (undated) DEVICE — PERMANENT CAUTERY HOOK: Brand: ENDOWRIST

## (undated) NOTE — LETTER
21    Patient: Israel Masterson  : 1970 Visit date: 2021    Dear  Jerry Jaimes MD    Thank you for referring Israel Masterson to my practice. Please find my assessment and plan below.     Assessment   Acute diverticulitis  (primary e

## (undated) NOTE — LETTER
AUTHORIZATION FOR SURGICAL OPERATION OR OTHER PROCEDURE    1. I hereby authorize Dr. Myles Moser, and [facility] staff assigned to my case to perform the following operation and/or procedure at the [facility]:    Bilateral foot cortisone injections _______________________________________________________________________________________________      _______________________________________________________________________________________________    2. My physician has explained the nature and purpose of the operation or other procedure, possible alternative methods of treatment, the risks involved, and the possibility of complication to me. I acknowledge that no guarantee has been made as to the result that may be obtained. 3.  I recognize that, during the course of this operation, or other procedure, unforseen conditions may necessitate additional or different procedure than those listed above. I, therefore, further authorize and request that the above named physician, his/her physician assistants or designees perform such procedures as are, in his/her professional opinion, necessary and desirable. 4.  Any tissue or organs removed in the operation or other procedure may be disposed of by and at the discretion of the [facility] and [facility] AT [facility]. 5.  I understand that in the event of a medical emergency, I will be transported by local paramedics to [facility] or other [facility] emergency department. 6.  I certify that I have read and fully understand the above consent to operation and/or other procedure. 7.  I acknowledge that my physician has explained sedation/analgesia administration to me including the risks and benefits. I consent to the administration of sedation/analgesia as may be necessary or desirable in the judgement of my physician.     Witness signature: ___________________________________________________ Date:  ______/______/_____ Time:  ________ A. M.  P.M. Patient Name:  ______________________________________________________  (please print)      Patient signature:  ___________________________________________________             Relationship to Patient:           []  Parent    Responsible person                          []  Spouse  In case of minor or                    [] Other  _____________   Incompetent name:  __________________________________________________                               (please print)      _____________      Responsible person  In case of minor or  Incompetent signature:  _______________________________________________    Statement of Physician  My signature below affirms that prior to the time of the procedure, I have explained to the patient and/or his/her guardian, the risks and benefits involved in the proposed treatment and any reasonable alternative to the proposed treatment. I have also explained the risks and benefits involved in the refusal of the proposed treatment and have answered the patient's questions.                         Date:  ______/______/_______  Provider                      Signature:  __________________________________________________________       Time:  ___________ A.M    P.M.

## (undated) NOTE — LETTER
10/29/2018  Steffanie Murphy [id number]              Dear [facility],       Here are your results from your recent visit with Gastroenterology.          Stool test for cyclospora was normal            If you need any further yanique

## (undated) NOTE — LETTER
Date & Time: 4/7/2021, 10:06 AM  Patient: Grace Franklin  Encounter Provider(s):    Briana Jorgensen MD       To Whom It May Concern:    Jeffery Major was seen and treated in our department on 4/7/2021.  Patient is diagnosed with acute diverticulitis

## (undated) NOTE — LETTER
AUTHORIZATION FOR SURGICAL OPERATION OR OTHER PROCEDURE    1. I hereby authorize Dr. Marge Sheldon, and [facility] staff assigned to my case to perform the following operation and/or procedure at the [facility]:    _______________________________________________________________________________________________    Cortisone injection Right Foot  _______________________________________________________________________________________________    2. My physician has explained the nature and purpose of the operation or other procedure, possible alternative methods of treatment, the risks involved, and the possibility of complication to me. I acknowledge that no guarantee has been made as to the result that may be obtained. 3.  I recognize that, during the course of this operation, or other procedure, unforseen conditions may necessitate additional or different procedure than those listed above. I, therefore, further authorize and request that the above named physician, his/her physician assistants or designees perform such procedures as are, in his/her professional opinion, necessary and desirable. 4.  Any tissue or organs removed in the operation or other procedure may be disposed of by and at the discretion of the [facility] and [facility] AT [facility] 5.  I understand that in the event of a medical emergency, I will be transported by local paramedics to [facility] or other hospital emergency department. 6.  I certify that I have read and fully understand the above consent to operation and/or other procedure. 7.  I acknowledge that my physician has explained sedation/analgesia administration to me including the risks and benefits. I consent to the administration of sedation/analgesia as may be necessary or desirable in the judgement of my physician.     Witness signature: ___________________________________________________ Date:  ______/______/_____ Time:  ________ A. M.  P.M. Patient Name:  ______________________________________________________  (please print)      Patient signature:  ___________________________________________________             Relationship to Patient:           []  Parent    Responsible person                          []  Spouse  In case of minor or                    [] Other  _____________   Incompetent name:  __________________________________________________                               (please print)      _____________      Responsible person  In case of minor or  Incompetent signature:  _______________________________________________    Statement of Physician  My signature below affirms that prior to the time of the procedure, I have explained to the patient and/or his/her guardian, the risks and benefits involved in the proposed treatment and any reasonable alternative to the proposed treatment. I have also explained the risks and benefits involved in the refusal of the proposed treatment and have answered the patient's questions.                         Date:  ______/______/_______  Provider                      Signature:  __________________________________________________________       Time:  ___________ A.M    P.M.

## (undated) NOTE — LETTER
21    Patient: Diane Piedra  : 1970 Visit date: 2021    Dear  Ty Ornelas MD    Thank you for referring Diane Piedra to my practice. Please find my assessment and plan below.     Assessment   Acute diverticulitis  (primary e All of the questions of the patient and her  were answered at this time. They both verbalized understanding and agreement with the plan of care. I have no further follow-up scheduled with this patient at this time.   This patient can see me or D

## (undated) NOTE — LETTER
21    Patient: Wander Encinas  : 1970 Visit date: 2021    Dear  Anay Joiner MD    Thank you for referring Wander Encinas to my practice. Please find my assessment and plan below.        Assessment   Acute diverticulitis  (james level of 3-4/10. Her most recent attack was the most severe episode of pain. She states she typically gets diarrhea with her attacks as well.     I have reviewed the patient's previous CT scans from 2017 as well, there was minor diverticulitis in the dist office after undergoing her cystoscopy to discuss the findings, and final surgical decision making at that time.     We will be tentatively scheduling the patient to undergo a robotic low anterior resection of the rectosigmoid colon with a possible segmenta

## (undated) NOTE — ED AVS SNAPSHOT
[facility] in [address]    Phone:  [phone]    Fax:  [phone]           Wu Ok   MRN: [id number]    Department:  [address] [facility]   Date of Visit:  4/1/2017 [phone] [address] [facility], [address]  [phone] [address] [place] [address]   [phone]       To Check ER Wait Times:  TEXT 'Wanda Price' to 478[address]      Click [email or web address] reading, you will be contacted. Please make sure we have your correct phone number before you leave. After you leave, you should follow the attached instructions. I have read and understand the instructions given to me by my caregivers.         24-Hour Call the [employer] for assistance with your inactive [facility] account    If you have questions, you can call [phone] to talk to our [facility] Staff. Remember, [facility] is NOT to be used for urgent needs. For medical emergencies, dial 911.     Vi

## (undated) NOTE — LETTER
[facility]  Phone: [phone]  OUTSIDE TESTING RESULT REQUEST      TO:   Dr. Sylvie Quick     Today's Date: 6/16/21    FAX #: [phone]     IMPORTANT: FOR YOUR IMMEDIATE ATTENTION  Please FAX all test results listed below to: 61

## (undated) NOTE — MR AVS SNAPSHOT
[facility] [address]  [address]  [phone]               Thank you for choosing us for your health care visit with Jazmin Walker MD.  We are glad to serve you and happy to provide you with t Take 1 tablet (25 mg total) by mouth 3 (three) times daily as needed.    Commonly known as:  ANTIVERT           Montelukast Sodium 10 MG Tabs   TAKE 1 TABLET BY MOUTH EVERY DAY   Commonly known as:  SINGULAIR           predniSONE 20 MG Tabs   Take 2 tablets active are less likely to develop some chronic diseases than adults who are inactive.      HOW TO GET STARTED: HOW TO STAY MOTIVATED:   Start activities slowly and build up over time Do what you like   Get your heart pumping – brisk walking, biking, swimmin

## (undated) NOTE — LETTER
10/30/2018  Marin Alba [address]              Dear Evan Calvin,       Here are your results from your recent visit with Gastroenterology.        Stool for ova/parasites were negative              If you need any further assi

## (undated) NOTE — ED AVS SNAPSHOT
Doretha Barger   MRN: [id number]    Department:  [facility] Emergency Department   Date of Visit:  11/13/2017           Disclosure     Insurance plans vary and the physician(s) referred by the ER may not be covered by your plan.  Please contact If you have been prescribed any medication(s), please fill your prescription right away and begin taking the medication(s) as directed    If the emergency physician has read X-rays, these will be re-interpreted by a radiologist.  If there is a significant

## (undated) NOTE — LETTER
10/11/2018  Steffanie Roy [id number]              Dear Waldemar Bobby,       Here are your results from your recent visit with Gastroenterology.          Kidney function was okay      CT scan was okay, no fistula or active inflam

## (undated) NOTE — LETTER
AUTHORIZATION FOR SURGICAL OPERATION OR OTHER PROCEDURE    1. I hereby authorize Dr. Gisela Novoa, and [address] [place] [address] staff assigned to my case to perform the following operation and/or procedure at the [address] [place] [address]:    Bilateral foot cortisone injection _______________________________________________________________________________________________      _______________________________________________________________________________________________    2. My physician has explained the nature and purpose of the operation or other procedure, possible alternative methods of treatment, the risks involved, and the possibility of complication to me. I acknowledge that no guarantee has been made as to the result that may be obtained. 3.  I recognize that, during the course of this operation, or other procedure, unforseen conditions may necessitate additional or different procedure than those listed above. I, therefore, further authorize and request that the above named physician, his/her physician assistants or designees perform such procedures as are, in his/her professional opinion, necessary and desirable. 4.  Any tissue or organs removed in the operation or other procedure may be disposed of by and at the discretion of the [address] [place] [address] and [facility]. 5.  I understand that in the event of a medical emergency, I will be transported by local paramedics to [facility] or other hospital emergency department. 6.  I certify that I have read and fully understand the above consent to operation and/or other procedure. 7.  I acknowledge that my physician has explained sedation/analgesia administration to me including the risks and benefits. I consent to the administration of sedation/analgesia as may be necessary or desirable in the judgement of my physician.     Witness signature: ___________________________________________________ Date:  ______/______/_____ Time:  ________ A. M.  P.M. Patient Name:  ______________________________________________________  (please print)      Patient signature:  ___________________________________________________             Relationship to Patient:           []  Parent    Responsible person                          []  Spouse  In case of minor or                    [] Other  _____________   Incompetent name:  __________________________________________________                               (please print)      _____________      Responsible person  In case of minor or  Incompetent signature:  _______________________________________________    Statement of Physician  My signature below affirms that prior to the time of the procedure, I have explained to the patient and/or his/her guardian, the risks and benefits involved in the proposed treatment and any reasonable alternative to the proposed treatment. I have also explained the risks and benefits involved in the refusal of the proposed treatment and have answered the patient's questions.                         Date:  ______/______/_______  Provider                      Signature:  __________________________________________________________       Time:  ___________ A.M    P.M.

## (undated) NOTE — LETTER
AUTHORIZATION FOR SURGICAL OPERATION OR OTHER PROCEDURE    1. [facility] Isabel Goodwin[facility] my case to perform the following operation and/or procedure at the [facility], [facility]:    _______________________________________________________________________________________________    Cortisone Injection Right Foot  _______________________________________________________________________________________________    2. My physician has explained the nature and purpose of the operation or other procedure, possible alternative methods of treatment, the risks involved, and the possibility of complication to me. I acknowledge that no guarantee has been made as to the result that may be obtained. 3.  I recognize that, during the course of this operation, or other procedure, unforseen conditions may necessitate additional or different procedure than those listed above. I, therefore, further authorize and request that the above named physician, his/her physician assistants or designees perform such procedures as are, in his/her professional opinion, necessary and desirable. 4.  Any tissue or organs removed in the operation or other procedure may be disposed of by and at the discretion of the [facility], [facility] and [facility]. 5.  I understand that in the event of a medical emergency, I will be transported by local paramedics to [facility] or other hospital emergency department. 6.  I certify that I have read and fully understand the above consent to operation and/or other procedure. 7.  I acknowledge that my physician has explained sedation/analgesia administration to me including the risks and benefits. I consent to the administration of sedation/analgesia as may be necessary or desirable in the judgement of my physician.     Witness signature: ___________________________________________________ Date:  ______/______/_____ Time:  ________ A. M.  P.M. Patient Name:  ______________________________________________________  (please print)      Patient signature:  ___________________________________________________             Relationship to Patient:           []  Parent    Responsible person                          []  Spouse  In case of minor or                    [] Other  _____________   Incompetent name:  __________________________________________________                               (please print)      _____________      Responsible person  In case of minor or  Incompetent signature:  _______________________________________________    Statement of Physician  My signature below affirms that prior to the time of the procedure, I have explained to the patient and/or his/her guardian, the risks and benefits involved in the proposed treatment and any reasonable alternative to the proposed treatment. I have also explained the risks and benefits involved in the refusal of the proposed treatment and have answered the patient's questions.                         Date:  ______/______/_______  Provider                      Signature:  __________________________________________________________       Time:  ___________ A.M    P.M.

## (undated) NOTE — LETTER
10/24/2018  [place]ff[place] [id number]              Dear Juan Fees,       Here are your results from your recent visit with Gastroenterology.          Inflammatory marker (the \"CRP\" and \"ESR\"), blood count, liver function te

## (undated) NOTE — LETTER
April 7, 2021    Patient: Marylen Dunning   Date of Visit: 4/7/2021       To Whom It May Concern:    Rhina Buck was seen and treated in our emergency department on 4/7/2021. She can return to work.     If you have any questions or concerns, please

## (undated) NOTE — MR AVS SNAPSHOT
[address]  [address] [place] [address] [phone]               Thank you for choosing us for your health care visit with Salvador Islas MD.  We are glad to serve you and happy to provide you with this raza return off and on over several weeks or longer. Home care  Follow these guidelines when caring for yourself at home:  · Rest quietly in bed if your symptoms are severe. Change position slowly.  There is usually one position that will feel best. This might Today's Vital Signs     BP Pulse Temp Height Weight BMI    124/80 mmHg 64 97.8 °F (36.6 °C) 64.75\" 160 lb 26.82 kg/m2         Current Medications          This list is accurate as of: 1/3/17  4:16 PM.  Always use your most recent med list. Increased urinary frequency [338808]           Migraine without aura and without status migrainosus, not intractable [[id number]]           BPPV (benign paroxysmal positional vertigo), left [[id number]]      Results of Recent Testing     URINALYSIS, AUTO,

## (undated) NOTE — LETTER
21    Patient: Jozef Joiner  : 1970 Visit date: 2021    Dear  Louise Torres MD    Thank you for referring Jozef Joiner to my practice. Please find my assessment and plan below.       Assessment   Acute diverticulitis  (primary